# Patient Record
Sex: MALE | Race: WHITE | Employment: OTHER | URBAN - METROPOLITAN AREA
[De-identification: names, ages, dates, MRNs, and addresses within clinical notes are randomized per-mention and may not be internally consistent; named-entity substitution may affect disease eponyms.]

---

## 2017-01-04 DIAGNOSIS — E78.00 PURE HYPERCHOLESTEROLEMIA: ICD-10-CM

## 2017-01-05 RX ORDER — ATORVASTATIN CALCIUM 10 MG/1
TABLET, FILM COATED ORAL
Qty: 90 TABLET | Refills: 1 | Status: SHIPPED | OUTPATIENT
Start: 2017-01-05 | End: 2017-05-24 | Stop reason: SDUPTHER

## 2017-01-18 RX ORDER — SOLIFENACIN SUCCINATE 5 MG/1
5 TABLET, FILM COATED ORAL NIGHTLY
Qty: 90 TABLET | Refills: 1 | Status: SHIPPED | OUTPATIENT
Start: 2017-01-18 | End: 2017-06-16 | Stop reason: SDUPTHER

## 2017-02-28 ENCOUNTER — OFFICE VISIT (OUTPATIENT)
Dept: FAMILY MEDICINE CLINIC | Age: 82
End: 2017-02-28

## 2017-02-28 VITALS
HEART RATE: 52 BPM | RESPIRATION RATE: 14 BRPM | HEIGHT: 69 IN | BODY MASS INDEX: 24.05 KG/M2 | SYSTOLIC BLOOD PRESSURE: 116 MMHG | WEIGHT: 162.38 LBS | DIASTOLIC BLOOD PRESSURE: 66 MMHG

## 2017-02-28 DIAGNOSIS — I25.10 CORONARY ARTERY DISEASE INVOLVING NATIVE CORONARY ARTERY OF NATIVE HEART WITHOUT ANGINA PECTORIS: ICD-10-CM

## 2017-02-28 DIAGNOSIS — N40.0 BENIGN NON-NODULAR PROSTATIC HYPERPLASIA WITHOUT LOWER URINARY TRACT SYMPTOMS: ICD-10-CM

## 2017-02-28 DIAGNOSIS — E78.00 PURE HYPERCHOLESTEROLEMIA: ICD-10-CM

## 2017-02-28 DIAGNOSIS — M48.061 SPINAL STENOSIS OF LUMBAR REGION: ICD-10-CM

## 2017-02-28 DIAGNOSIS — G20 PARKINSON DISEASE (HCC): Primary | ICD-10-CM

## 2017-02-28 PROCEDURE — 99213 OFFICE O/P EST LOW 20 MIN: CPT | Performed by: FAMILY MEDICINE

## 2017-02-28 ASSESSMENT — ENCOUNTER SYMPTOMS
BACK PAIN: 0
CHEST TIGHTNESS: 0
EYE PAIN: 0
NAUSEA: 0
TROUBLE SWALLOWING: 0
CONSTIPATION: 0
SORE THROAT: 0
ORTHOPNEA: 0
COUGH: 0
ABDOMINAL PAIN: 0
BLOOD IN STOOL: 0
SHORTNESS OF BREATH: 0

## 2017-02-28 ASSESSMENT — PATIENT HEALTH QUESTIONNAIRE - PHQ9
SUM OF ALL RESPONSES TO PHQ9 QUESTIONS 1 & 2: 0
1. LITTLE INTEREST OR PLEASURE IN DOING THINGS: 0
SUM OF ALL RESPONSES TO PHQ QUESTIONS 1-9: 0
2. FEELING DOWN, DEPRESSED OR HOPELESS: 0

## 2017-04-20 ENCOUNTER — OFFICE VISIT (OUTPATIENT)
Dept: PHYSICAL MEDICINE AND REHAB | Age: 82
End: 2017-04-20

## 2017-04-20 VITALS
HEIGHT: 69 IN | WEIGHT: 166 LBS | HEART RATE: 47 BPM | BODY MASS INDEX: 24.59 KG/M2 | SYSTOLIC BLOOD PRESSURE: 145 MMHG | DIASTOLIC BLOOD PRESSURE: 64 MMHG

## 2017-04-20 DIAGNOSIS — M21.372 FOOT DROP, LEFT: ICD-10-CM

## 2017-04-20 DIAGNOSIS — G20 PARKINSON'S DISEASE (HCC): Primary | ICD-10-CM

## 2017-04-20 PROCEDURE — G8427 DOCREV CUR MEDS BY ELIG CLIN: HCPCS | Performed by: PSYCHIATRY & NEUROLOGY

## 2017-04-20 PROCEDURE — G8420 CALC BMI NORM PARAMETERS: HCPCS | Performed by: PSYCHIATRY & NEUROLOGY

## 2017-04-20 PROCEDURE — 1123F ACP DISCUSS/DSCN MKR DOCD: CPT | Performed by: PSYCHIATRY & NEUROLOGY

## 2017-04-20 PROCEDURE — 99213 OFFICE O/P EST LOW 20 MIN: CPT | Performed by: PSYCHIATRY & NEUROLOGY

## 2017-04-20 PROCEDURE — 4040F PNEUMOC VAC/ADMIN/RCVD: CPT | Performed by: PSYCHIATRY & NEUROLOGY

## 2017-04-20 PROCEDURE — G8599 NO ASA/ANTIPLAT THER USE RNG: HCPCS | Performed by: PSYCHIATRY & NEUROLOGY

## 2017-04-20 PROCEDURE — 1036F TOBACCO NON-USER: CPT | Performed by: PSYCHIATRY & NEUROLOGY

## 2017-05-24 ENCOUNTER — OFFICE VISIT (OUTPATIENT)
Dept: FAMILY MEDICINE CLINIC | Age: 82
End: 2017-05-24

## 2017-05-24 VITALS
BODY MASS INDEX: 24.07 KG/M2 | HEIGHT: 69 IN | HEART RATE: 56 BPM | WEIGHT: 162.5 LBS | DIASTOLIC BLOOD PRESSURE: 66 MMHG | SYSTOLIC BLOOD PRESSURE: 112 MMHG | RESPIRATION RATE: 12 BRPM

## 2017-05-24 DIAGNOSIS — M48.061 SPINAL STENOSIS OF LUMBAR REGION: ICD-10-CM

## 2017-05-24 DIAGNOSIS — G20 PARKINSON DISEASE (HCC): ICD-10-CM

## 2017-05-24 DIAGNOSIS — E78.00 PURE HYPERCHOLESTEROLEMIA: ICD-10-CM

## 2017-05-24 DIAGNOSIS — N40.0 BENIGN NON-NODULAR PROSTATIC HYPERPLASIA WITHOUT LOWER URINARY TRACT SYMPTOMS: ICD-10-CM

## 2017-05-24 DIAGNOSIS — I25.10 CORONARY ARTERY DISEASE INVOLVING NATIVE CORONARY ARTERY OF NATIVE HEART WITHOUT ANGINA PECTORIS: Primary | ICD-10-CM

## 2017-05-24 DIAGNOSIS — H61.21 IMPACTED CERUMEN OF RIGHT EAR: ICD-10-CM

## 2017-05-24 DIAGNOSIS — I10 ESSENTIAL HYPERTENSION: ICD-10-CM

## 2017-05-24 PROCEDURE — 99213 OFFICE O/P EST LOW 20 MIN: CPT | Performed by: FAMILY MEDICINE

## 2017-05-24 PROCEDURE — 69210 REMOVE IMPACTED EAR WAX UNI: CPT | Performed by: FAMILY MEDICINE

## 2017-05-24 RX ORDER — ATORVASTATIN CALCIUM 10 MG/1
TABLET, FILM COATED ORAL
Qty: 90 TABLET | Refills: 1 | Status: SHIPPED | OUTPATIENT
Start: 2017-05-24 | End: 2017-11-28 | Stop reason: SDUPTHER

## 2017-05-24 ASSESSMENT — ENCOUNTER SYMPTOMS
ABDOMINAL PAIN: 0
TROUBLE SWALLOWING: 0
CONSTIPATION: 0
CHEST TIGHTNESS: 0
NAUSEA: 0
EYE PAIN: 0
BACK PAIN: 0
BLOOD IN STOOL: 0
SHORTNESS OF BREATH: 0
SORE THROAT: 0
ORTHOPNEA: 0
COUGH: 0

## 2017-05-24 ASSESSMENT — PATIENT HEALTH QUESTIONNAIRE - PHQ9
SUM OF ALL RESPONSES TO PHQ QUESTIONS 1-9: 0
SUM OF ALL RESPONSES TO PHQ9 QUESTIONS 1 & 2: 0
2. FEELING DOWN, DEPRESSED OR HOPELESS: 0
1. LITTLE INTEREST OR PLEASURE IN DOING THINGS: 0

## 2017-06-12 ENCOUNTER — PROCEDURE VISIT (OUTPATIENT)
Dept: UROLOGY | Age: 82
End: 2017-06-12

## 2017-06-12 VITALS — BODY MASS INDEX: 22.48 KG/M2 | WEIGHT: 157 LBS | HEIGHT: 70 IN

## 2017-06-12 DIAGNOSIS — Z85.51 HISTORY OF BLADDER CANCER: Primary | ICD-10-CM

## 2017-06-12 PROCEDURE — 52000 CYSTOURETHROSCOPY: CPT | Performed by: UROLOGY

## 2017-06-12 PROCEDURE — 1036F TOBACCO NON-USER: CPT | Performed by: UROLOGY

## 2017-06-12 PROCEDURE — 99999 PR OFFICE/OUTPT VISIT,PROCEDURE ONLY: CPT | Performed by: UROLOGY

## 2017-06-12 RX ORDER — CIPROFLOXACIN 250 MG/1
250 TABLET, FILM COATED ORAL 2 TIMES DAILY
Qty: 14 TABLET | Refills: 0 | Status: SHIPPED | OUTPATIENT
Start: 2017-06-12 | End: 2017-06-19

## 2017-06-18 RX ORDER — SOLIFENACIN SUCCINATE 5 MG/1
TABLET, FILM COATED ORAL
Qty: 90 TABLET | Refills: 1 | Status: SHIPPED | OUTPATIENT
Start: 2017-06-18 | End: 2018-01-26 | Stop reason: SDUPTHER

## 2017-08-01 ENCOUNTER — HOSPITAL ENCOUNTER (OUTPATIENT)
Age: 82
Discharge: HOME | End: 2017-08-01
Payer: MEDICARE

## 2017-08-01 ENCOUNTER — HOSPITAL ENCOUNTER (OUTPATIENT)
Dept: AUDIOLOGY | Age: 82
Discharge: HOME | End: 2017-08-01

## 2017-08-01 DIAGNOSIS — I10 ESSENTIAL HYPERTENSION: ICD-10-CM

## 2017-08-01 DIAGNOSIS — E78.00 PURE HYPERCHOLESTEROLEMIA: ICD-10-CM

## 2017-08-01 LAB
ALBUMIN SERPL-MCNC: 3.8 G/DL (ref 3.5–5.1)
ALP BLD-CCNC: 88 U/L (ref 38–126)
ALT SERPL-CCNC: 24 U/L (ref 11–66)
ANION GAP SERPL CALCULATED.3IONS-SCNC: 12 MEQ/L (ref 8–16)
AST SERPL-CCNC: 21 U/L (ref 5–40)
BASOPHILS # BLD: 0.6 %
BASOPHILS ABSOLUTE: 0 THOU/MM3 (ref 0–0.1)
BILIRUB SERPL-MCNC: 0.7 MG/DL (ref 0.3–1.2)
BILIRUBIN DIRECT: < 0.2 MG/DL (ref 0–0.3)
BUN BLDV-MCNC: 18 MG/DL (ref 7–22)
CALCIUM SERPL-MCNC: 9.1 MG/DL (ref 8.5–10.5)
CHLORIDE BLD-SCNC: 105 MEQ/L (ref 98–111)
CHOLESTEROL, TOTAL: 129 MG/DL (ref 100–199)
CO2: 28 MEQ/L (ref 23–33)
CREAT SERPL-MCNC: 0.8 MG/DL (ref 0.4–1.2)
EOSINOPHIL # BLD: 2 %
EOSINOPHILS ABSOLUTE: 0.1 THOU/MM3 (ref 0–0.4)
GFR SERPL CREATININE-BSD FRML MDRD: > 90 ML/MIN/1.73M2
GLUCOSE BLD-MCNC: 106 MG/DL (ref 70–108)
HCT VFR BLD CALC: 42.7 % (ref 42–52)
HDLC SERPL-MCNC: 37 MG/DL
HEMOGLOBIN: 14.5 GM/DL (ref 14–18)
LDL CHOLESTEROL CALCULATED: 68 MG/DL
LYMPHOCYTES # BLD: 14.1 %
LYMPHOCYTES ABSOLUTE: 0.9 THOU/MM3 (ref 1–4.8)
MCH RBC QN AUTO: 31.8 PG (ref 27–31)
MCHC RBC AUTO-ENTMCNC: 34 GM/DL (ref 33–37)
MCV RBC AUTO: 93.5 FL (ref 80–94)
MONOCYTES # BLD: 8.2 %
MONOCYTES ABSOLUTE: 0.5 THOU/MM3 (ref 0.4–1.3)
NUCLEATED RED BLOOD CELLS: 0 /100 WBC
PDW BLD-RTO: 14.1 % (ref 11.5–14.5)
PLATELET # BLD: 132 THOU/MM3 (ref 130–400)
PMV BLD AUTO: 8.3 MCM (ref 7.4–10.4)
POTASSIUM SERPL-SCNC: 4.2 MEQ/L (ref 3.5–5.2)
RBC # BLD: 4.57 MILL/MM3 (ref 4.7–6.1)
RBC # BLD: NORMAL 10*6/UL
SEG NEUTROPHILS: 75.1 %
SEGMENTED NEUTROPHILS ABSOLUTE COUNT: 4.7 THOU/MM3 (ref 1.8–7.7)
SODIUM BLD-SCNC: 145 MEQ/L (ref 135–145)
TOTAL PROTEIN: 7.2 G/DL (ref 6.1–8)
TRIGL SERPL-MCNC: 118 MG/DL (ref 0–199)
TSH SERPL DL<=0.05 MIU/L-ACNC: 1.4 UIU/ML (ref 0.4–4.2)
WBC # BLD: 6.3 THOU/MM3 (ref 4.8–10.8)

## 2017-08-01 PROCEDURE — 80061 LIPID PANEL: CPT

## 2017-08-01 PROCEDURE — 36415 COLL VENOUS BLD VENIPUNCTURE: CPT

## 2017-08-01 PROCEDURE — 82248 BILIRUBIN DIRECT: CPT

## 2017-08-01 PROCEDURE — 9990000010 HC NO CHARGE VISIT: Performed by: AUDIOLOGIST

## 2017-08-01 PROCEDURE — 80050 GENERAL HEALTH PANEL: CPT

## 2017-08-03 ENCOUNTER — OFFICE VISIT (OUTPATIENT)
Dept: FAMILY MEDICINE CLINIC | Age: 82
End: 2017-08-03

## 2017-08-03 ENCOUNTER — TELEPHONE (OUTPATIENT)
Dept: FAMILY MEDICINE CLINIC | Age: 82
End: 2017-08-03

## 2017-08-03 VITALS
SYSTOLIC BLOOD PRESSURE: 116 MMHG | RESPIRATION RATE: 16 BRPM | DIASTOLIC BLOOD PRESSURE: 54 MMHG | WEIGHT: 162.25 LBS | BODY MASS INDEX: 23.23 KG/M2 | HEIGHT: 70 IN | HEART RATE: 68 BPM

## 2017-08-03 DIAGNOSIS — G20 PARKINSON DISEASE (HCC): ICD-10-CM

## 2017-08-03 DIAGNOSIS — H61.23 BILATERAL IMPACTED CERUMEN: Primary | ICD-10-CM

## 2017-08-03 PROCEDURE — 99213 OFFICE O/P EST LOW 20 MIN: CPT | Performed by: FAMILY MEDICINE

## 2017-08-03 PROCEDURE — 69210 REMOVE IMPACTED EAR WAX UNI: CPT | Performed by: FAMILY MEDICINE

## 2017-08-03 ASSESSMENT — ENCOUNTER SYMPTOMS
CONSTIPATION: 0
SHORTNESS OF BREATH: 0
SINUS PRESSURE: 0

## 2017-08-11 ENCOUNTER — TELEPHONE (OUTPATIENT)
Dept: FAMILY MEDICINE CLINIC | Age: 82
End: 2017-08-11

## 2017-08-25 ENCOUNTER — HOSPITAL ENCOUNTER (OUTPATIENT)
Age: 82
Discharge: HOME | End: 2017-08-25
Payer: MEDICARE

## 2017-08-25 ENCOUNTER — OFFICE VISIT (OUTPATIENT)
Dept: FAMILY MEDICINE CLINIC | Age: 82
End: 2017-08-25

## 2017-08-25 ENCOUNTER — HOSPITAL ENCOUNTER (OUTPATIENT)
Dept: GENERAL RADIOLOGY | Age: 82
Discharge: HOME | End: 2017-08-25
Payer: MEDICARE

## 2017-08-25 VITALS
HEART RATE: 70 BPM | BODY MASS INDEX: 23.25 KG/M2 | WEIGHT: 162.4 LBS | RESPIRATION RATE: 14 BRPM | DIASTOLIC BLOOD PRESSURE: 80 MMHG | HEIGHT: 70 IN | SYSTOLIC BLOOD PRESSURE: 140 MMHG

## 2017-08-25 DIAGNOSIS — R09.02 HYPOXIA: ICD-10-CM

## 2017-08-25 DIAGNOSIS — I25.10 CORONARY ARTERY DISEASE INVOLVING NATIVE CORONARY ARTERY OF NATIVE HEART WITHOUT ANGINA PECTORIS: ICD-10-CM

## 2017-08-25 DIAGNOSIS — M48.061 SPINAL STENOSIS OF LUMBAR REGION: Primary | ICD-10-CM

## 2017-08-25 DIAGNOSIS — N40.0 BENIGN NON-NODULAR PROSTATIC HYPERPLASIA WITHOUT LOWER URINARY TRACT SYMPTOMS: ICD-10-CM

## 2017-08-25 DIAGNOSIS — R53.83 FATIGUE, UNSPECIFIED TYPE: ICD-10-CM

## 2017-08-25 DIAGNOSIS — C43.72 MALIGNANT MELANOMA OF LEFT LOWER EXTREMITY INCLUDING HIP (HCC): ICD-10-CM

## 2017-08-25 DIAGNOSIS — G20 PARKINSON DISEASE (HCC): ICD-10-CM

## 2017-08-25 PROCEDURE — 99213 OFFICE O/P EST LOW 20 MIN: CPT | Performed by: FAMILY MEDICINE

## 2017-08-25 PROCEDURE — 71020 XR CHEST STANDARD TWO VW: CPT

## 2017-08-25 ASSESSMENT — ENCOUNTER SYMPTOMS
CHEST TIGHTNESS: 0
BLOOD IN STOOL: 0
SORE THROAT: 0
COUGH: 0
EYE PAIN: 0
CONSTIPATION: 0
BACK PAIN: 0
NAUSEA: 0
SHORTNESS OF BREATH: 0
TROUBLE SWALLOWING: 0
ABDOMINAL PAIN: 0

## 2017-09-18 RX ORDER — FINASTERIDE 5 MG/1
TABLET, FILM COATED ORAL
Qty: 90 TABLET | Refills: 0 | Status: SHIPPED | OUTPATIENT
Start: 2017-09-18 | End: 2018-01-01 | Stop reason: SDUPTHER

## 2017-10-09 RX ORDER — TAMSULOSIN HYDROCHLORIDE 0.4 MG/1
CAPSULE ORAL
Qty: 90 CAPSULE | Refills: 3 | Status: SHIPPED | OUTPATIENT
Start: 2017-10-09 | End: 2018-01-01 | Stop reason: SDUPTHER

## 2017-10-09 NOTE — TELEPHONE ENCOUNTER
Tamsulosin refill requested. Patient last seen on 6/12/17 by Dr. Tammy Reyes. Follow up appt is 6/18/18. Last refill on 10/29/16. Patient pharmacy is [facility]. Thank you.

## 2017-10-26 ENCOUNTER — HOSPITAL ENCOUNTER (OUTPATIENT)
Dept: AUDIOLOGY | Age: 82
Discharge: HOME | End: 2017-10-26

## 2017-10-26 PROCEDURE — 9990000010 HC NO CHARGE VISIT: Performed by: AUDIOLOGIST

## 2017-11-28 ENCOUNTER — OFFICE VISIT (OUTPATIENT)
Dept: FAMILY MEDICINE CLINIC | Age: 82
End: 2017-11-28

## 2017-11-28 VITALS
HEIGHT: 70 IN | HEART RATE: 76 BPM | BODY MASS INDEX: 23.19 KG/M2 | WEIGHT: 162 LBS | SYSTOLIC BLOOD PRESSURE: 132 MMHG | DIASTOLIC BLOOD PRESSURE: 80 MMHG | RESPIRATION RATE: 16 BRPM

## 2017-11-28 DIAGNOSIS — E78.00 PURE HYPERCHOLESTEROLEMIA: ICD-10-CM

## 2017-11-28 DIAGNOSIS — M48.061 SPINAL STENOSIS OF LUMBAR REGION, UNSPECIFIED WHETHER NEUROGENIC CLAUDICATION PRESENT: ICD-10-CM

## 2017-11-28 DIAGNOSIS — G20 PARKINSON DISEASE (HCC): ICD-10-CM

## 2017-11-28 DIAGNOSIS — N40.0 BENIGN PROSTATIC HYPERPLASIA WITHOUT LOWER URINARY TRACT SYMPTOMS: ICD-10-CM

## 2017-11-28 DIAGNOSIS — I25.10 CORONARY ARTERY DISEASE INVOLVING NATIVE CORONARY ARTERY OF NATIVE HEART WITHOUT ANGINA PECTORIS: Primary | ICD-10-CM

## 2017-11-28 DIAGNOSIS — G47.33 OBSTRUCTIVE SLEEP APNEA SYNDROME: ICD-10-CM

## 2017-11-28 PROCEDURE — 99213 OFFICE O/P EST LOW 20 MIN: CPT | Performed by: FAMILY MEDICINE

## 2017-11-28 RX ORDER — ATORVASTATIN CALCIUM 10 MG/1
TABLET, FILM COATED ORAL
Qty: 90 TABLET | Refills: 1 | Status: SHIPPED | OUTPATIENT
Start: 2017-11-28 | End: 2018-03-02 | Stop reason: SDUPTHER

## 2017-11-28 ASSESSMENT — ENCOUNTER SYMPTOMS
EYE PAIN: 0
SORE THROAT: 0
BLOOD IN STOOL: 0
SHORTNESS OF BREATH: 0
CONSTIPATION: 0
ABDOMINAL PAIN: 0
BACK PAIN: 0
TROUBLE SWALLOWING: 0
NAUSEA: 0
COUGH: 0
CHEST TIGHTNESS: 0

## 2017-11-28 NOTE — PROGRESS NOTES
neck stiffness. Skin: Negative for rash. Neurological: Negative for dizziness, focal weakness, weakness and headaches. Hematological: Negative for adenopathy. Does not bruise/bleed easily. Psychiatric/Behavioral: Negative for behavioral problems, dysphoric mood and sleep disturbance. /80 (Site: Right Arm, Position: Sitting, Cuff Size: Medium Adult)   Pulse 76   Resp 16   Ht 5' 10\" (1.778 m)   Wt 162 lb (73.5 kg)   BMI 23.24 kg/m²   Objective:   Physical Exam   Constitutional: He is oriented to person, place, and time. He appears well-developed and well-nourished. HENT:   Head: Normocephalic and atraumatic. Right Ear: External ear normal.   Left Ear: External ear normal.   Nose: Nose normal.   Mouth/Throat: Oropharynx is clear and moist.   Eyes: Conjunctivae and EOM are normal. Pupils are equal, round, and reactive to light. Fundi nl   Neck: Normal range of motion. Neck supple. No thyromegaly present. Cardiovascular: Normal rate, regular rhythm, normal heart sounds and intact distal pulses. Pulmonary/Chest: Effort normal and breath sounds normal. He has no wheezes. He has no rales. Abdominal: Soft. Bowel sounds are normal. He exhibits no mass. There is no tenderness. Musculoskeletal: Normal range of motion. Lymphadenopathy:     He has no cervical adenopathy. Neurological: He is alert and oriented to person, place, and time. He has normal reflexes. No cranial nerve deficit. Skin: Skin is warm and dry. No rash noted. Psychiatric: He has a normal mood and affect. Nursing note and vitals reviewed. Assessment:      1. Coronary artery disease involving native coronary artery of native heart without angina pectoris     2. Pure hypercholesterolemia  atorvastatin (LIPITOR) 10 MG tablet   3. Spinal stenosis of lumbar region, unspecified whether neurogenic claudication present     4. Parkinson disease ([address]     5.  Benign prostatic hyperplasia without lower urinary tract symptoms     6. Obstructive sleep apnea syndrome           Plan:      Current Outpatient Prescriptions   Medication Sig Dispense Refill    tamsulosin (FLOMAX) 0.4 MG capsule TAKE 1 CAPSULE BY MOUTH EVERY NIGHT 90 capsule 3    finasteride (PROSCAR) 5 MG tablet TAKE 1 TABLET BY MOUTH DAILY 90 tablet 0    VESICARE 5 MG tablet TAKE 1 TABLET NIGHTLY 90 tablet 1    atorvastatin (LIPITOR) 10 MG tablet TAKE 1 TABLET BY MOUTH DAILY 90 tablet 1    carbidopa-levodopa (SINEMET)  MG per tablet TAKE 1 TABLET BY MOUTH THREE TIMES DAILY 270 tablet 3    aspirin 81 MG EC tablet Take 1 tablet by mouth daily 30 tablet 3    amLODIPine (NORVASC) 2.5 MG tablet Take 1 tablet by mouth daily 30 tablet 0    Coenzyme Q10 (COQ10) 400 MG CAPS Take 1 capsule by mouth daily      Multiple Vitamins-Minerals (PRESERVISION/LUTEIN) CAPS Take 1 capsule by mouth 2 times daily      cetirizine (ZYRTEC) 10 MG tablet Take 10 mg by mouth daily.  fish oil-omega-3 fatty acids 1000 MG capsule Take 1 g by mouth daily       Multiple Vitamin (MULTIVITAMIN PO)   Take 1 tablet by mouth daily        No current facility-administered medications for this visit. No orders of the defined types were placed in this encounter. No results found for this visit on 11/28/17.

## 2017-12-04 ENCOUNTER — OFFICE VISIT (OUTPATIENT)
Dept: NEUROLOGY | Age: 82
End: 2017-12-04
Payer: MEDICARE

## 2017-12-04 VITALS
WEIGHT: 167 LBS | HEART RATE: 62 BPM | HEIGHT: 70 IN | DIASTOLIC BLOOD PRESSURE: 68 MMHG | BODY MASS INDEX: 23.91 KG/M2 | SYSTOLIC BLOOD PRESSURE: 140 MMHG

## 2017-12-04 DIAGNOSIS — G20 PARKINSON'S DISEASE (HCC): Primary | ICD-10-CM

## 2017-12-04 DIAGNOSIS — M21.372 LEFT FOOT DROP: ICD-10-CM

## 2017-12-04 PROCEDURE — 1123F ACP DISCUSS/DSCN MKR DOCD: CPT | Performed by: PSYCHIATRY & NEUROLOGY

## 2017-12-04 PROCEDURE — G8420 CALC BMI NORM PARAMETERS: HCPCS | Performed by: PSYCHIATRY & NEUROLOGY

## 2017-12-04 PROCEDURE — 99213 OFFICE O/P EST LOW 20 MIN: CPT | Performed by: PSYCHIATRY & NEUROLOGY

## 2017-12-04 PROCEDURE — 1036F TOBACCO NON-USER: CPT | Performed by: PSYCHIATRY & NEUROLOGY

## 2017-12-04 PROCEDURE — G8482 FLU IMMUNIZE ORDER/ADMIN: HCPCS | Performed by: PSYCHIATRY & NEUROLOGY

## 2017-12-04 PROCEDURE — 4040F PNEUMOC VAC/ADMIN/RCVD: CPT | Performed by: PSYCHIATRY & NEUROLOGY

## 2017-12-04 PROCEDURE — G8427 DOCREV CUR MEDS BY ELIG CLIN: HCPCS | Performed by: PSYCHIATRY & NEUROLOGY

## 2017-12-04 PROCEDURE — G8599 NO ASA/ANTIPLAT THER USE RNG: HCPCS | Performed by: PSYCHIATRY & NEUROLOGY

## 2017-12-04 NOTE — PROGRESS NOTES
NEUROLOGY OUT PATIENT FOLLOW UP NOTE:  12/4/20172:48 PM    Silver Adkins is here for follow up for Parkinson's, ataxic gait. The patient reports that he is doing better with the tremor on Sinemet. He denies any falls. No speech changes, no vision changes, no new numbness. He is compliant. He is tolerating the medication well. He is active. Medications tolerated well. He appears stable. He uses a cane. ROS:  Respiratory : no cough, no hemoptysis. Cardiac: no chest pain. No palpitations. Renal : no flank pain, no hematuria    Skin: no rash  Reviewed labs since last evaluation and discussed with patient. Allergies   Allergen Reactions    Sulfa Antibiotics      Unknown reaction from childhood       Current Outpatient Prescriptions:     atorvastatin (LIPITOR) 10 MG tablet, TAKE 1 TABLET BY MOUTH DAILY, Disp: 90 tablet, Rfl: 1    tamsulosin (FLOMAX) 0.4 MG capsule, TAKE 1 CAPSULE BY MOUTH EVERY NIGHT, Disp: 90 capsule, Rfl: 3    finasteride (PROSCAR) 5 MG tablet, TAKE 1 TABLET BY MOUTH DAILY, Disp: 90 tablet, Rfl: 0    VESICARE 5 MG tablet, TAKE 1 TABLET NIGHTLY, Disp: 90 tablet, Rfl: 1    carbidopa-levodopa (SINEMET)  MG per tablet, TAKE 1 TABLET BY MOUTH THREE TIMES DAILY, Disp: 270 tablet, Rfl: 3    aspirin 81 MG EC tablet, Take 1 tablet by mouth daily, Disp: 30 tablet, Rfl: 3    amLODIPine (NORVASC) 2.5 MG tablet, Take 1 tablet by mouth daily, Disp: 30 tablet, Rfl: 0    Coenzyme Q10 (COQ10) 400 MG CAPS, Take 1 capsule by mouth daily, Disp: , Rfl:     Multiple Vitamins-Minerals (PRESERVISION/LUTEIN) CAPS, Take 1 capsule by mouth 2 times daily, Disp: , Rfl:     cetirizine (ZYRTEC) 10 MG tablet, Take 10 mg by mouth daily.   , Disp: , Rfl:     fish oil-omega-3 fatty acids 1000 MG capsule, Take 1 g by mouth daily , Disp: , Rfl:     Multiple Vitamin (MULTIVITAMIN PO),  Take 1 tablet by mouth daily , Disp: , Rfl:       PE:   Vitals:    12/04/17 1434   BP: (!) 140/68   Site: Left Arm   Position: Sitting   Cuff Size: Medium Adult   Pulse: 62   Weight: 167 lb (75.8 kg)   Height: 5' 10\" (1.778 m)     General Appearance:  alert and cooperative  Gen: AO3, NAD, Language is Intact. Head: PERRL, EOMI, no icterus  Neck: There is no carotid bruits. The Neck is supple. Neuro: CN 2-12 grossly intact with no focal deficits. Power 5/5 Throughout symmetric, except mild left foot drop, Reflexes are decreased and symmetric. Long tracts are decreased distal. Cerebellar exam is Intact. Sensory exam is intact to light touch. Gait is guarded. There is no tremor. His speech pitch is improved. There is no muscle atrophy or fasciculation. Tends to cross his left/right leg when seated. Musculoskeletal:  Has no hand arthritis, no limitation of ROM in any of the four extremities,.   Lower extremities no edema        DATA:  Results for orders placed or performed during the hospital encounter of 08/01/17   Hepatic Function Panel   Result Value Ref Range    Alb 3.8 3.5 - 5.1 g/dL    Total Bilirubin 0.7 0.3 - 1.2 mg/dL    Bilirubin, Direct <0.2 0.0 - 0.3 mg/dL    Alkaline Phosphatase 88 38 - 126 U/L    AST 21 5 - 40 U/L    ALT 24 11 - 66 U/L    Total Protein 7.2 6.1 - 8.0 g/dL   Lipid Panel   Result Value Ref Range    Cholesterol, Total 129 100 - 199 mg/dL    Triglycerides 118 0 - 199 mg/dL    HDL 37 mg/dL    LDL Calculated 68 mg/dL   TSH with Reflex   Result Value Ref Range    TSH 1.400 0.400 - 4.20 uIU/mL   Comprehensive Metabolic Panel   Result Value Ref Range    Glucose 106 70 - 108 mg/dL    CREATININE 0.8 0.4 - 1.2 mg/dL    BUN 18 7 - 22 mg/dL    Sodium 145 135 - 145 meq/L    Potassium 4.2 3.5 - 5.2 meq/L    Chloride 105 98 - 111 meq/L    CO2 28 23 - 33 meq/L    Calcium 9.1 8.5 - 10.5 mg/dL   CBC Auto Differential   Result Value Ref Range    WBC 6.3 4.8 - 10.8 thou/mm3    RBC 4.57 (L) 4.70 - 6.10 mill/mm3    Hemoglobin 14.5 14.0 - 18.0 gm/dl    Hematocrit 42.7 42.0 - 52.0 %    MCV 93.5 80.0 - 94.0 fL    MCH 31.8 (H) 27.0 -

## 2018-01-01 ENCOUNTER — APPOINTMENT (OUTPATIENT)
Dept: CARDIAC REHAB | Age: 83
End: 2018-01-01
Payer: MEDICARE

## 2018-01-01 ENCOUNTER — APPOINTMENT (OUTPATIENT)
Dept: MRI IMAGING | Age: 83
DRG: 247 | End: 2018-01-01
Payer: MEDICARE

## 2018-01-01 ENCOUNTER — TELEPHONE (OUTPATIENT)
Dept: UROLOGY | Age: 83
End: 2018-01-01

## 2018-01-01 ENCOUNTER — APPOINTMENT (OUTPATIENT)
Dept: CT IMAGING | Age: 83
DRG: 247 | End: 2018-01-01
Payer: MEDICARE

## 2018-01-01 ENCOUNTER — APPOINTMENT (OUTPATIENT)
Dept: CARDIAC CATH/INVASIVE PROCEDURES | Age: 83
DRG: 247 | End: 2018-01-01
Payer: MEDICARE

## 2018-01-01 ENCOUNTER — HOSPITAL ENCOUNTER (OUTPATIENT)
Dept: CARDIAC REHAB | Age: 83
Setting detail: THERAPIES SERIES
Discharge: HOME | End: 2018-12-26
Payer: MEDICARE

## 2018-01-01 ENCOUNTER — HOSPITAL ENCOUNTER (OUTPATIENT)
Dept: CARDIAC REHAB | Age: 83
Setting detail: THERAPIES SERIES
Discharge: HOME | End: 2018-12-14
Payer: MEDICARE

## 2018-01-01 ENCOUNTER — HOSPITAL ENCOUNTER (OUTPATIENT)
Dept: CARDIAC REHAB | Age: 83
Setting detail: THERAPIES SERIES
Discharge: HOME | End: 2018-12-31
Payer: MEDICARE

## 2018-01-01 ENCOUNTER — APPOINTMENT (OUTPATIENT)
Dept: GENERAL RADIOLOGY | Age: 83
DRG: 247 | End: 2018-01-01
Payer: MEDICARE

## 2018-01-01 ENCOUNTER — TELEPHONE (OUTPATIENT)
Dept: FAMILY MEDICINE CLINIC | Age: 83
End: 2018-01-01

## 2018-01-01 ENCOUNTER — HOSPITAL ENCOUNTER (OUTPATIENT)
Age: 83
Discharge: HOME | End: 2018-10-18
Payer: MEDICARE

## 2018-01-01 ENCOUNTER — HOSPITAL ENCOUNTER (OUTPATIENT)
Dept: CARDIAC REHAB | Age: 83
Setting detail: THERAPIES SERIES
Discharge: HOME | End: 2018-12-07
Payer: MEDICARE

## 2018-01-01 ENCOUNTER — HOSPITAL ENCOUNTER (OUTPATIENT)
Age: 83
Discharge: HOME | End: 2018-08-07
Payer: MEDICARE

## 2018-01-01 ENCOUNTER — ANESTHESIA EVENT (OUTPATIENT)
Dept: OPERATING ROOM | Age: 83
End: 2018-01-01
Payer: MEDICARE

## 2018-01-01 ENCOUNTER — HOSPITAL ENCOUNTER (OUTPATIENT)
Dept: CARDIAC REHAB | Age: 83
Setting detail: THERAPIES SERIES
Discharge: HOME | End: 2018-12-19
Payer: MEDICARE

## 2018-01-01 ENCOUNTER — CARE COORDINATION (OUTPATIENT)
Dept: CASE MANAGEMENT | Age: 83
End: 2018-01-01

## 2018-01-01 ENCOUNTER — OFFICE VISIT (OUTPATIENT)
Dept: FAMILY MEDICINE CLINIC | Age: 83
End: 2018-01-01

## 2018-01-01 ENCOUNTER — HOSPITAL ENCOUNTER (OUTPATIENT)
Dept: CARDIAC REHAB | Age: 83
Setting detail: THERAPIES SERIES
Discharge: HOME | End: 2018-12-21
Payer: MEDICARE

## 2018-01-01 ENCOUNTER — OFFICE VISIT (OUTPATIENT)
Dept: UROLOGY | Age: 83
End: 2018-01-01
Payer: MEDICARE

## 2018-01-01 ENCOUNTER — HOSPITAL ENCOUNTER (OUTPATIENT)
Dept: CARDIAC REHAB | Age: 83
Setting detail: THERAPIES SERIES
Discharge: HOME | End: 2018-11-26
Payer: MEDICARE

## 2018-01-01 ENCOUNTER — OFFICE VISIT (OUTPATIENT)
Dept: NEUROLOGY | Age: 83
End: 2018-01-01
Payer: MEDICARE

## 2018-01-01 ENCOUNTER — HOSPITAL ENCOUNTER (OUTPATIENT)
Dept: CARDIAC REHAB | Age: 83
Setting detail: THERAPIES SERIES
Discharge: HOME | End: 2018-12-05
Payer: MEDICARE

## 2018-01-01 ENCOUNTER — HOSPITAL ENCOUNTER (OUTPATIENT)
Dept: CARDIAC REHAB | Age: 83
Setting detail: THERAPIES SERIES
Discharge: HOME | End: 2018-12-28
Payer: MEDICARE

## 2018-01-01 ENCOUNTER — HOSPITAL ENCOUNTER (OUTPATIENT)
Age: 83
Setting detail: OUTPATIENT SURGERY
Discharge: HOME | End: 2018-08-03
Attending: UROLOGY | Admitting: UROLOGY
Payer: MEDICARE

## 2018-01-01 ENCOUNTER — HOSPITAL ENCOUNTER (OUTPATIENT)
Dept: CARDIAC REHAB | Age: 83
Setting detail: THERAPIES SERIES
End: 2018-01-01
Payer: MEDICARE

## 2018-01-01 ENCOUNTER — HOSPITAL ENCOUNTER (OUTPATIENT)
Age: 83
Discharge: HOME | End: 2018-07-17
Payer: MEDICARE

## 2018-01-01 ENCOUNTER — HOSPITAL ENCOUNTER (OUTPATIENT)
Dept: CARDIAC REHAB | Age: 83
Setting detail: THERAPIES SERIES
Discharge: HOME | End: 2018-12-03
Payer: MEDICARE

## 2018-01-01 ENCOUNTER — HOSPITAL ENCOUNTER (OUTPATIENT)
Age: 83
Discharge: HOME | End: 2018-07-27
Payer: MEDICARE

## 2018-01-01 ENCOUNTER — HOSPITAL ENCOUNTER (OUTPATIENT)
Dept: CARDIAC REHAB | Age: 83
Setting detail: THERAPIES SERIES
Discharge: HOME | End: 2018-12-12
Payer: MEDICARE

## 2018-01-01 ENCOUNTER — PROCEDURE VISIT (OUTPATIENT)
Dept: UROLOGY | Age: 83
End: 2018-01-01
Payer: MEDICARE

## 2018-01-01 ENCOUNTER — HOSPITAL ENCOUNTER (OUTPATIENT)
Dept: CARDIAC REHAB | Age: 83
Setting detail: THERAPIES SERIES
Discharge: HOME | End: 2018-11-28
Payer: MEDICARE

## 2018-01-01 ENCOUNTER — HOSPITAL ENCOUNTER (OUTPATIENT)
Dept: AUDIOLOGY | Age: 83
Discharge: HOME | End: 2018-04-18

## 2018-01-01 ENCOUNTER — NURSE ONLY (OUTPATIENT)
Dept: FAMILY MEDICINE CLINIC | Age: 83
End: 2018-01-01

## 2018-01-01 ENCOUNTER — HOSPITAL ENCOUNTER (INPATIENT)
Age: 83
LOS: 4 days | Discharge: HOME | DRG: 247 | End: 2018-11-06
Attending: FAMILY MEDICINE | Admitting: FAMILY MEDICINE
Payer: MEDICARE

## 2018-01-01 ENCOUNTER — ANESTHESIA (OUTPATIENT)
Dept: OPERATING ROOM | Age: 83
End: 2018-01-01
Payer: MEDICARE

## 2018-01-01 ENCOUNTER — TELEPHONE (OUTPATIENT)
Dept: NEUROSURGERY | Age: 83
End: 2018-01-01

## 2018-01-01 ENCOUNTER — HOSPITAL ENCOUNTER (OUTPATIENT)
Dept: CARDIAC REHAB | Age: 83
Setting detail: THERAPIES SERIES
Discharge: HOME | End: 2018-12-10
Payer: MEDICARE

## 2018-01-01 ENCOUNTER — APPOINTMENT (OUTPATIENT)
Dept: INTERVENTIONAL RADIOLOGY/VASCULAR | Age: 83
DRG: 247 | End: 2018-01-01
Payer: MEDICARE

## 2018-01-01 ENCOUNTER — HOSPITAL ENCOUNTER (OUTPATIENT)
Dept: CARDIAC REHAB | Age: 83
Setting detail: THERAPIES SERIES
Discharge: HOME | End: 2018-11-20
Payer: MEDICARE

## 2018-01-01 ENCOUNTER — TELEPHONE (OUTPATIENT)
Dept: NEUROLOGY | Age: 83
End: 2018-01-01

## 2018-01-01 VITALS
WEIGHT: 155.6 LBS | RESPIRATION RATE: 16 BRPM | DIASTOLIC BLOOD PRESSURE: 66 MMHG | HEART RATE: 56 BPM | SYSTOLIC BLOOD PRESSURE: 124 MMHG | HEIGHT: 70 IN | BODY MASS INDEX: 22.28 KG/M2

## 2018-01-01 VITALS
TEMPERATURE: 98.2 F | HEART RATE: 64 BPM | WEIGHT: 151.4 LBS | RESPIRATION RATE: 18 BRPM | HEIGHT: 70 IN | DIASTOLIC BLOOD PRESSURE: 56 MMHG | SYSTOLIC BLOOD PRESSURE: 116 MMHG | BODY MASS INDEX: 21.67 KG/M2 | OXYGEN SATURATION: 93 %

## 2018-01-01 VITALS
DIASTOLIC BLOOD PRESSURE: 73 MMHG | BODY MASS INDEX: 21.72 KG/M2 | SYSTOLIC BLOOD PRESSURE: 165 MMHG | HEART RATE: 69 BPM | HEIGHT: 70 IN

## 2018-01-01 VITALS
HEART RATE: 74 BPM | BODY MASS INDEX: 22.19 KG/M2 | WEIGHT: 155 LBS | DIASTOLIC BLOOD PRESSURE: 68 MMHG | SYSTOLIC BLOOD PRESSURE: 130 MMHG | HEIGHT: 70 IN

## 2018-01-01 VITALS
BODY MASS INDEX: 23.79 KG/M2 | DIASTOLIC BLOOD PRESSURE: 58 MMHG | WEIGHT: 157 LBS | SYSTOLIC BLOOD PRESSURE: 126 MMHG | HEIGHT: 68 IN

## 2018-01-01 VITALS
HEART RATE: 58 BPM | HEIGHT: 70 IN | SYSTOLIC BLOOD PRESSURE: 126 MMHG | DIASTOLIC BLOOD PRESSURE: 72 MMHG | WEIGHT: 158.6 LBS | BODY MASS INDEX: 22.71 KG/M2

## 2018-01-01 VITALS
WEIGHT: 152 LBS | HEIGHT: 70 IN | SYSTOLIC BLOOD PRESSURE: 140 MMHG | BODY MASS INDEX: 21.76 KG/M2 | DIASTOLIC BLOOD PRESSURE: 56 MMHG

## 2018-01-01 VITALS
WEIGHT: 158.5 LBS | RESPIRATION RATE: 12 BRPM | HEART RATE: 60 BPM | HEIGHT: 68 IN | SYSTOLIC BLOOD PRESSURE: 110 MMHG | BODY MASS INDEX: 24.02 KG/M2 | DIASTOLIC BLOOD PRESSURE: 66 MMHG

## 2018-01-01 VITALS
HEIGHT: 68 IN | SYSTOLIC BLOOD PRESSURE: 106 MMHG | DIASTOLIC BLOOD PRESSURE: 66 MMHG | BODY MASS INDEX: 23.72 KG/M2 | WEIGHT: 156.5 LBS | RESPIRATION RATE: 14 BRPM | HEART RATE: 60 BPM

## 2018-01-01 VITALS
RESPIRATION RATE: 10 BRPM | SYSTOLIC BLOOD PRESSURE: 143 MMHG | OXYGEN SATURATION: 100 % | DIASTOLIC BLOOD PRESSURE: 67 MMHG

## 2018-01-01 VITALS
WEIGHT: 160 LBS | DIASTOLIC BLOOD PRESSURE: 66 MMHG | BODY MASS INDEX: 22.9 KG/M2 | RESPIRATION RATE: 14 BRPM | HEIGHT: 70 IN | SYSTOLIC BLOOD PRESSURE: 120 MMHG | HEART RATE: 52 BPM

## 2018-01-01 VITALS
SYSTOLIC BLOOD PRESSURE: 141 MMHG | RESPIRATION RATE: 16 BRPM | WEIGHT: 154 LBS | OXYGEN SATURATION: 92 % | HEART RATE: 64 BPM | TEMPERATURE: 97.8 F | BODY MASS INDEX: 23.42 KG/M2 | DIASTOLIC BLOOD PRESSURE: 65 MMHG

## 2018-01-01 VITALS
RESPIRATION RATE: 16 BRPM | SYSTOLIC BLOOD PRESSURE: 120 MMHG | HEART RATE: 60 BPM | BODY MASS INDEX: 22.05 KG/M2 | DIASTOLIC BLOOD PRESSURE: 66 MMHG | WEIGHT: 154 LBS | HEIGHT: 70 IN

## 2018-01-01 VITALS
DIASTOLIC BLOOD PRESSURE: 60 MMHG | WEIGHT: 155 LBS | BODY MASS INDEX: 22.19 KG/M2 | SYSTOLIC BLOOD PRESSURE: 120 MMHG | HEIGHT: 70 IN

## 2018-01-01 DIAGNOSIS — D49.4 BLADDER TUMOR: Primary | ICD-10-CM

## 2018-01-01 DIAGNOSIS — D49.4 BLADDER TUMOR: ICD-10-CM

## 2018-01-01 DIAGNOSIS — G20 PARKINSON DISEASE (HCC): ICD-10-CM

## 2018-01-01 DIAGNOSIS — E78.00 PURE HYPERCHOLESTEROLEMIA: ICD-10-CM

## 2018-01-01 DIAGNOSIS — R55 SYNCOPE AND COLLAPSE: ICD-10-CM

## 2018-01-01 DIAGNOSIS — I25.10 CORONARY ARTERY DISEASE INVOLVING NATIVE CORONARY ARTERY OF NATIVE HEART WITHOUT ANGINA PECTORIS: Primary | ICD-10-CM

## 2018-01-01 DIAGNOSIS — R41.82 ALTERED MENTAL STATUS, UNSPECIFIED ALTERED MENTAL STATUS TYPE: Primary | ICD-10-CM

## 2018-01-01 DIAGNOSIS — I25.10 CORONARY ARTERY DISEASE INVOLVING NATIVE CORONARY ARTERY OF NATIVE HEART WITHOUT ANGINA PECTORIS: ICD-10-CM

## 2018-01-01 DIAGNOSIS — R55 SYNCOPE AND COLLAPSE: Primary | ICD-10-CM

## 2018-01-01 DIAGNOSIS — G20 PARKINSON DISEASE (HCC): Primary | ICD-10-CM

## 2018-01-01 DIAGNOSIS — G20 PARKINSON'S DISEASE (HCC): Primary | ICD-10-CM

## 2018-01-01 DIAGNOSIS — Z83.2 FAMILY HISTORY OF FACTOR V LEIDEN MUTATION: ICD-10-CM

## 2018-01-01 DIAGNOSIS — G47.33 OBSTRUCTIVE SLEEP APNEA SYNDROME: ICD-10-CM

## 2018-01-01 DIAGNOSIS — L30.9 DERMATITIS: Primary | ICD-10-CM

## 2018-01-01 DIAGNOSIS — H61.21 HEARING LOSS OF RIGHT EAR DUE TO CERUMEN IMPACTION: ICD-10-CM

## 2018-01-01 DIAGNOSIS — S06.5XAA SUBDURAL HEMATOMA: ICD-10-CM

## 2018-01-01 DIAGNOSIS — N40.0 BENIGN PROSTATIC HYPERPLASIA WITHOUT LOWER URINARY TRACT SYMPTOMS: ICD-10-CM

## 2018-01-01 DIAGNOSIS — M21.372 LEFT FOOT DROP: ICD-10-CM

## 2018-01-01 DIAGNOSIS — C67.8 MALIGNANT NEOPLASM OF OVERLAPPING SITES OF BLADDER (HCC): ICD-10-CM

## 2018-01-01 DIAGNOSIS — R55 VASOVAGAL SYNCOPE: ICD-10-CM

## 2018-01-01 DIAGNOSIS — C67.8 MALIGNANT NEOPLASM OF OVERLAPPING SITES OF BLADDER (HCC): Primary | ICD-10-CM

## 2018-01-01 DIAGNOSIS — R35.0 URINARY FREQUENCY: ICD-10-CM

## 2018-01-01 DIAGNOSIS — K21.9 GASTROESOPHAGEAL REFLUX DISEASE WITHOUT ESOPHAGITIS: ICD-10-CM

## 2018-01-01 DIAGNOSIS — Z01.818 PRE-OP TESTING: ICD-10-CM

## 2018-01-01 DIAGNOSIS — Z23 NEED FOR INFLUENZA VACCINATION: Primary | ICD-10-CM

## 2018-01-01 DIAGNOSIS — N30.00 ACUTE CYSTITIS WITHOUT HEMATURIA: ICD-10-CM

## 2018-01-01 DIAGNOSIS — R35.0 URINARY FREQUENCY: Primary | ICD-10-CM

## 2018-01-01 DIAGNOSIS — G20 PARKINSON'S DISEASE (HCC): ICD-10-CM

## 2018-01-01 DIAGNOSIS — Z83.2 FAMILY HISTORY OF FACTOR V LEIDEN MUTATION: Primary | ICD-10-CM

## 2018-01-01 LAB
ABO: NORMAL
ALBUMIN SERPL-MCNC: 3.5 G/DL (ref 3.5–5.1)
ALBUMIN SERPL-MCNC: 3.9 G/DL (ref 3.5–5.1)
ALP BLD-CCNC: 103 U/L (ref 38–126)
ALP BLD-CCNC: 91 U/L (ref 38–126)
ALT SERPL-CCNC: 6 U/L (ref 11–66)
ALT SERPL-CCNC: < 5 U/L (ref 11–66)
ANION GAP SERPL CALCULATED.3IONS-SCNC: 11 MEQ/L (ref 8–16)
ANION GAP SERPL CALCULATED.3IONS-SCNC: 12 MEQ/L (ref 8–16)
ANION GAP SERPL CALCULATED.3IONS-SCNC: 14 MEQ/L (ref 8–16)
ANION GAP SERPL CALCULATED.3IONS-SCNC: 9 MEQ/L (ref 8–16)
ANTIBODY SCREEN: NORMAL
APTT: 30.6 SECONDS (ref 22–38)
AST SERPL-CCNC: 20 U/L (ref 5–40)
AST SERPL-CCNC: 22 U/L (ref 5–40)
BACTERIA URINE, POC: ABNORMAL
BACTERIA: ABNORMAL /HPF
BASOPHILS # BLD: 0.4 %
BASOPHILS # BLD: 0.5 %
BASOPHILS ABSOLUTE: 0 THOU/MM3 (ref 0–0.1)
BASOPHILS ABSOLUTE: 0 THOU/MM3 (ref 0–0.1)
BILIRUB SERPL-MCNC: 0.5 MG/DL (ref 0.3–1.2)
BILIRUB SERPL-MCNC: 0.5 MG/DL (ref 0.3–1.2)
BILIRUBIN URINE: 0 MG/DL
BILIRUBIN URINE: NEGATIVE
BLOOD URINE, POC: ABNORMAL
BLOOD URINE, POC: ABNORMAL
BLOOD, URINE: NEGATIVE
BUN BLDV-MCNC: 13 MG/DL (ref 7–22)
BUN BLDV-MCNC: 16 MG/DL (ref 7–22)
BUN BLDV-MCNC: 19 MG/DL (ref 7–22)
BUN BLDV-MCNC: 24 MG/DL (ref 7–22)
CALCIUM SERPL-MCNC: 8.6 MG/DL (ref 8.5–10.5)
CALCIUM SERPL-MCNC: 8.9 MG/DL (ref 8.5–10.5)
CALCIUM SERPL-MCNC: 9.1 MG/DL (ref 8.5–10.5)
CALCIUM SERPL-MCNC: 9.2 MG/DL (ref 8.5–10.5)
CASTS 2: ABNORMAL /LPF
CASTS UA: ABNORMAL /LPF
CASTS URINE, POC: ABNORMAL
CHARACTER, URINE: ABNORMAL
CHARACTER, URINE: CLEAR
CHLORIDE BLD-SCNC: 102 MEQ/L (ref 98–111)
CHLORIDE BLD-SCNC: 105 MEQ/L (ref 98–111)
CHLORIDE BLD-SCNC: 105 MEQ/L (ref 98–111)
CHLORIDE BLD-SCNC: 106 MEQ/L (ref 98–111)
CHOLESTEROL, TOTAL: 150 MG/DL (ref 100–199)
CHOLESTEROL, TOTAL: 168 MG/DL (ref 100–199)
CLARITY: ABNORMAL
CO2: 22 MEQ/L (ref 23–33)
CO2: 26 MEQ/L (ref 23–33)
CO2: 27 MEQ/L (ref 23–33)
CO2: 27 MEQ/L (ref 23–33)
COLOR, URINE: YELLOW
COLOR, URINE: YELLOW
COLOR: YELLOW
CREAT SERPL-MCNC: 0.6 MG/DL (ref 0.4–1.2)
CREAT SERPL-MCNC: 0.7 MG/DL (ref 0.4–1.2)
CREAT SERPL-MCNC: 0.8 MG/DL (ref 0.4–1.2)
CREAT SERPL-MCNC: 0.8 MG/DL (ref 0.4–1.2)
CRYSTALS URINE, POC: ABNORMAL
CRYSTALS, UA: ABNORMAL
EKG ATRIAL RATE: 56 BPM
EKG ATRIAL RATE: 70 BPM
EKG P AXIS: 55 DEGREES
EKG P AXIS: 74 DEGREES
EKG P-R INTERVAL: 182 MS
EKG P-R INTERVAL: 188 MS
EKG Q-T INTERVAL: 452 MS
EKG Q-T INTERVAL: 478 MS
EKG QRS DURATION: 128 MS
EKG QRS DURATION: 138 MS
EKG QTC CALCULATION (BAZETT): 461 MS
EKG QTC CALCULATION (BAZETT): 488 MS
EKG R AXIS: -60 DEGREES
EKG R AXIS: -63 DEGREES
EKG T AXIS: 10 DEGREES
EKG T AXIS: 36 DEGREES
EKG VENTRICULAR RATE: 56 BPM
EKG VENTRICULAR RATE: 70 BPM
EOSINOPHIL # BLD: 1.3 %
EOSINOPHIL # BLD: 2 %
EOSINOPHILS ABSOLUTE: 0.1 THOU/MM3 (ref 0–0.4)
EOSINOPHILS ABSOLUTE: 0.1 THOU/MM3 (ref 0–0.4)
EPI CELLS URINE, POC: ABNORMAL
EPITHELIAL CELLS, UA: ABNORMAL /HPF
ERYTHROCYTE [DISTWIDTH] IN BLOOD BY AUTOMATED COUNT: 13.2 % (ref 11.5–14.5)
ERYTHROCYTE [DISTWIDTH] IN BLOOD BY AUTOMATED COUNT: 13.3 % (ref 11.5–14.5)
ERYTHROCYTE [DISTWIDTH] IN BLOOD BY AUTOMATED COUNT: 13.4 % (ref 11.5–14.5)
ERYTHROCYTE [DISTWIDTH] IN BLOOD BY AUTOMATED COUNT: 45.1 FL (ref 35–45)
ERYTHROCYTE [DISTWIDTH] IN BLOOD BY AUTOMATED COUNT: 45.4 FL (ref 35–45)
ERYTHROCYTE [DISTWIDTH] IN BLOOD BY AUTOMATED COUNT: 46.5 FL (ref 35–45)
ERYTHROCYTE [DISTWIDTH] IN BLOOD BY AUTOMATED COUNT: 46.5 FL (ref 35–45)
ERYTHROCYTE [DISTWIDTH] IN BLOOD BY AUTOMATED COUNT: 47 FL (ref 35–45)
FACTOR V LEIDEN MUTATION: NORMAL
GFR SERPL CREATININE-BSD FRML MDRD: > 90 ML/MIN/1.73M2
GLUCOSE BLD-MCNC: 101 MG/DL (ref 70–108)
GLUCOSE BLD-MCNC: 102 MG/DL (ref 70–108)
GLUCOSE BLD-MCNC: 107 MG/DL (ref 70–108)
GLUCOSE BLD-MCNC: 113 MG/DL (ref 70–108)
GLUCOSE BLD-MCNC: 98 MG/DL (ref 70–108)
GLUCOSE URINE: NEGATIVE
GLUCOSE URINE: NEGATIVE MG/DL
HCT VFR BLD CALC: 38.8 % (ref 42–52)
HCT VFR BLD CALC: 39 % (ref 42–52)
HCT VFR BLD CALC: 41 % (ref 42–52)
HCT VFR BLD CALC: 43.3 % (ref 42–52)
HCT VFR BLD CALC: 44.2 % (ref 42–52)
HDLC SERPL-MCNC: 30 MG/DL
HDLC SERPL-MCNC: 34 MG/DL
HEMOGLOBIN: 13.1 GM/DL (ref 14–18)
HEMOGLOBIN: 13.5 GM/DL (ref 14–18)
HEMOGLOBIN: 13.9 GM/DL (ref 14–18)
HEMOGLOBIN: 14.3 GM/DL (ref 14–18)
HEMOGLOBIN: 14.7 GM/DL (ref 14–18)
IMMATURE GRANS (ABS): 0.01 THOU/MM3 (ref 0–0.07)
IMMATURE GRANS (ABS): 0.02 THOU/MM3 (ref 0–0.07)
IMMATURE GRANULOCYTES: 0.2 %
IMMATURE GRANULOCYTES: 0.3 %
INR BLD: 1.07 (ref 0.85–1.13)
INR BLD: 1.1 (ref 0.85–1.13)
KETONES, URINE: ABNORMAL
KETONES, URINE: NEGATIVE
LDL CHOLESTEROL CALCULATED: 91 MG/DL
LDL CHOLESTEROL CALCULATED: 97 MG/DL
LEUKOCYTE CLUMPS, URINE: ABNORMAL
LEUKOCYTE CLUMPS, URINE: ABNORMAL
LEUKOCYTE EST, POC: NEGATIVE
LEUKOCYTE ESTERASE, URINE: ABNORMAL
LEUKOCYTE ESTERASE, URINE: NEGATIVE
LV EF: 55 %
LVEF MODALITY: NORMAL
LYMPHOCYTES # BLD: 12.9 %
LYMPHOCYTES # BLD: 17.6 %
LYMPHOCYTES ABSOLUTE: 1 THOU/MM3 (ref 1–4.8)
LYMPHOCYTES ABSOLUTE: 1.1 THOU/MM3 (ref 1–4.8)
MCH RBC QN AUTO: 31.4 PG (ref 26–33)
MCH RBC QN AUTO: 31.7 PG (ref 26–33)
MCH RBC QN AUTO: 31.8 PG (ref 26–33)
MCH RBC QN AUTO: 32 PG (ref 26–33)
MCH RBC QN AUTO: 32.4 PG (ref 26–33)
MCHC RBC AUTO-ENTMCNC: 33 GM/DL (ref 32.2–35.5)
MCHC RBC AUTO-ENTMCNC: 33.3 GM/DL (ref 32.2–35.5)
MCHC RBC AUTO-ENTMCNC: 33.8 GM/DL (ref 32.2–35.5)
MCHC RBC AUTO-ENTMCNC: 33.9 GM/DL (ref 32.2–35.5)
MCHC RBC AUTO-ENTMCNC: 34.6 GM/DL (ref 32.2–35.5)
MCV RBC AUTO: 93.4 FL (ref 80–94)
MCV RBC AUTO: 93.5 FL (ref 80–94)
MCV RBC AUTO: 94.6 FL (ref 80–94)
MCV RBC AUTO: 95 FL (ref 80–94)
MCV RBC AUTO: 95.7 FL (ref 80–94)
MISCELLANEOUS 2: ABNORMAL
MONOCYTES # BLD: 7 %
MONOCYTES # BLD: 9.9 %
MONOCYTES ABSOLUTE: 0.5 THOU/MM3 (ref 0.4–1.3)
MONOCYTES ABSOLUTE: 0.6 THOU/MM3 (ref 0.4–1.3)
NITRITE, URINE: NEGATIVE
NUCLEATED RED BLOOD CELLS: 0 /100 WBC
NUCLEATED RED BLOOD CELLS: 0 /100 WBC
ORGANISM: ABNORMAL
ORGANISM: ABNORMAL
OSMOLALITY CALCULATION: 288 MOSMOL/KG (ref 275–300)
PH UA: 6
PH UA: 6.5
PH UA: 6.5 (ref 4.5–8)
PH, URINE: 5.5
PH, URINE: 7
PLATELET # BLD: 135 THOU/MM3 (ref 130–400)
PLATELET # BLD: 136 THOU/MM3 (ref 130–400)
PLATELET # BLD: 143 THOU/MM3 (ref 130–400)
PLATELET # BLD: 143 THOU/MM3 (ref 130–400)
PLATELET # BLD: 152 THOU/MM3 (ref 130–400)
PMV BLD AUTO: 10 FL (ref 9.4–12.4)
PMV BLD AUTO: 10 FL (ref 9.4–12.4)
PMV BLD AUTO: 10.2 FL (ref 9.4–12.4)
PMV BLD AUTO: 10.7 FL (ref 9.4–12.4)
PMV BLD AUTO: 9.9 FL (ref 9.4–12.4)
POTASSIUM REFLEX MAGNESIUM: 4 MEQ/L (ref 3.5–5.2)
POTASSIUM SERPL-SCNC: 3.8 MEQ/L (ref 3.5–5.2)
POTASSIUM SERPL-SCNC: 4 MEQ/L (ref 3.5–5.2)
POTASSIUM SERPL-SCNC: 4.1 MEQ/L (ref 3.5–5.2)
PRO-BNP: 210.6 PG/ML (ref 0–1800)
PROTEIN UA: NEGATIVE
PROTEIN, URINE: NEGATIVE MG/DL
PROTEIN, URINE: NEGATIVE MG/DL
RBC # BLD: 4.1 MILL/MM3 (ref 4.7–6.1)
RBC # BLD: 4.17 MILL/MM3 (ref 4.7–6.1)
RBC # BLD: 4.39 MILL/MM3 (ref 4.7–6.1)
RBC # BLD: 4.56 MILL/MM3 (ref 4.7–6.1)
RBC # BLD: 4.62 MILL/MM3 (ref 4.7–6.1)
RBC URINE, POC: ABNORMAL
RBC URINE: ABNORMAL /HPF
RENAL EPITHELIAL, UA: ABNORMAL
RH FACTOR: NORMAL
SEG NEUTROPHILS: 69.8 %
SEG NEUTROPHILS: 78.1 %
SEGMENTED NEUTROPHILS ABSOLUTE COUNT: 4.3 THOU/MM3 (ref 1.8–7.7)
SEGMENTED NEUTROPHILS ABSOLUTE COUNT: 6 THOU/MM3 (ref 1.8–7.7)
SODIUM BLD-SCNC: 138 MEQ/L (ref 135–145)
SODIUM BLD-SCNC: 140 MEQ/L (ref 135–145)
SODIUM BLD-SCNC: 143 MEQ/L (ref 135–145)
SODIUM BLD-SCNC: 145 MEQ/L (ref 135–145)
SPECIFIC GRAVITY UA: 1.02 (ref 1–1.03)
SPECIFIC GRAVITY, URINE: 1.01 (ref 1–1.03)
SPECIFIC GRAVITY, URINE: 1.02 (ref 1–1.03)
SPECIFIC GRAVITY, URINE: 1.02 (ref 1–1.03)
SPECIFIC GRAVITY, URINE: <= 1.005 (ref 1–1.03)
TOTAL PROTEIN: 6.5 G/DL (ref 6.1–8)
TOTAL PROTEIN: 7.3 G/DL (ref 6.1–8)
TRIGL SERPL-MCNC: 124 MG/DL (ref 0–199)
TRIGL SERPL-MCNC: 205 MG/DL (ref 0–199)
TROPONIN T: < 0.01 NG/ML
TSH SERPL DL<=0.05 MIU/L-ACNC: 1.62 UIU/ML (ref 0.4–4.2)
URINE CULTURE REFLEX: ABNORMAL
URINE CULTURE, ROUTINE: ABNORMAL
UROBILINOGEN, URINE: 0.2 EU/DL
UROBILINOGEN, URINE: 0.2 EU/DL
UROBILINOGEN, URINE: 1 EU/DL
UROBILINOGEN, URINE: 1 EU/DL
UROBILINOGEN, URINE: NORMAL
WBC # BLD: 6.1 THOU/MM3 (ref 4.8–10.8)
WBC # BLD: 6.1 THOU/MM3 (ref 4.8–10.8)
WBC # BLD: 7.4 THOU/MM3 (ref 4.8–10.8)
WBC # BLD: 7.4 THOU/MM3 (ref 4.8–10.8)
WBC # BLD: 7.7 THOU/MM3 (ref 4.8–10.8)
WBC UA: ABNORMAL /HPF
WBC URINE, POC: ABNORMAL
YEAST URINE, POC: ABNORMAL
YEAST: ABNORMAL

## 2018-01-01 PROCEDURE — 93880 EXTRACRANIAL BILAT STUDY: CPT

## 2018-01-01 PROCEDURE — 82948 REAGENT STRIP/BLOOD GLUCOSE: CPT

## 2018-01-01 PROCEDURE — 99999 PR OFFICE/OUTPT VISIT,PROCEDURE ONLY: CPT | Performed by: UROLOGY

## 2018-01-01 PROCEDURE — 1123F ACP DISCUSS/DSCN MKR DOCD: CPT | Performed by: PSYCHIATRY & NEUROLOGY

## 2018-01-01 PROCEDURE — 87147 CULTURE TYPE IMMUNOLOGIC: CPT

## 2018-01-01 PROCEDURE — 2500000003 HC RX 250 WO HCPCS: Performed by: FAMILY MEDICINE

## 2018-01-01 PROCEDURE — 1101F PT FALLS ASSESS-DOCD LE1/YR: CPT | Performed by: PSYCHIATRY & NEUROLOGY

## 2018-01-01 PROCEDURE — 1036F TOBACCO NON-USER: CPT | Performed by: PSYCHIATRY & NEUROLOGY

## 2018-01-01 PROCEDURE — 36415 COLL VENOUS BLD VENIPUNCTURE: CPT

## 2018-01-01 PROCEDURE — 6370000000 HC RX 637 (ALT 250 FOR IP): Performed by: FAMILY MEDICINE

## 2018-01-01 PROCEDURE — G0422 INTENS CARDIAC REHAB W/EXERC: HCPCS

## 2018-01-01 PROCEDURE — 93005 ELECTROCARDIOGRAM TRACING: CPT | Performed by: FAMILY MEDICINE

## 2018-01-01 PROCEDURE — G0423 INTENS CARDIAC REHAB NO EXER: HCPCS

## 2018-01-01 PROCEDURE — 85025 COMPLETE CBC W/AUTO DIFF WBC: CPT

## 2018-01-01 PROCEDURE — 4040F PNEUMOC VAC/ADMIN/RCVD: CPT | Performed by: PSYCHIATRY & NEUROLOGY

## 2018-01-01 PROCEDURE — 81003 URINALYSIS AUTO W/O SCOPE: CPT | Performed by: UROLOGY

## 2018-01-01 PROCEDURE — 99213 OFFICE O/P EST LOW 20 MIN: CPT | Performed by: PSYCHIATRY & NEUROLOGY

## 2018-01-01 PROCEDURE — 2580000003 HC RX 258: Performed by: INTERNAL MEDICINE

## 2018-01-01 PROCEDURE — 87186 SC STD MICRODIL/AGAR DIL: CPT

## 2018-01-01 PROCEDURE — C1760 CLOSURE DEV, VASC: HCPCS

## 2018-01-01 PROCEDURE — B2111ZZ FLUOROSCOPY OF MULTIPLE CORONARY ARTERIES USING LOW OSMOLAR CONTRAST: ICD-10-PCS | Performed by: INTERNAL MEDICINE

## 2018-01-01 PROCEDURE — C9600 PERC DRUG-EL COR STENT SING: HCPCS | Performed by: INTERNAL MEDICINE

## 2018-01-01 PROCEDURE — 85610 PROTHROMBIN TIME: CPT

## 2018-01-01 PROCEDURE — 93010 ELECTROCARDIOGRAM REPORT: CPT | Performed by: NUCLEAR MEDICINE

## 2018-01-01 PROCEDURE — 6360000002 HC RX W HCPCS: Performed by: NURSE ANESTHETIST, CERTIFIED REGISTERED

## 2018-01-01 PROCEDURE — G8598 ASA/ANTIPLAT THER USED: HCPCS | Performed by: NURSE PRACTITIONER

## 2018-01-01 PROCEDURE — 96372 THER/PROPH/DIAG INJ SC/IM: CPT

## 2018-01-01 PROCEDURE — G8427 DOCREV CUR MEDS BY ELIG CLIN: HCPCS | Performed by: PSYCHIATRY & NEUROLOGY

## 2018-01-01 PROCEDURE — 87086 URINE CULTURE/COLONY COUNT: CPT

## 2018-01-01 PROCEDURE — 99213 OFFICE O/P EST LOW 20 MIN: CPT | Performed by: FAMILY MEDICINE

## 2018-01-01 PROCEDURE — 3600000013 HC SURGERY LEVEL 3 ADDTL 15MIN: Performed by: UROLOGY

## 2018-01-01 PROCEDURE — G8420 CALC BMI NORM PARAMETERS: HCPCS | Performed by: PSYCHIATRY & NEUROLOGY

## 2018-01-01 PROCEDURE — 2580000003 HC RX 258: Performed by: FAMILY MEDICINE

## 2018-01-01 PROCEDURE — G8978 MOBILITY CURRENT STATUS: HCPCS

## 2018-01-01 PROCEDURE — 7100000010 HC PHASE II RECOVERY - FIRST 15 MIN: Performed by: UROLOGY

## 2018-01-01 PROCEDURE — 6360000002 HC RX W HCPCS: Performed by: FAMILY MEDICINE

## 2018-01-01 PROCEDURE — C1769 GUIDE WIRE: HCPCS

## 2018-01-01 PROCEDURE — 6360000002 HC RX W HCPCS

## 2018-01-01 PROCEDURE — G8599 NO ASA/ANTIPLAT THER USE RNG: HCPCS | Performed by: PSYCHIATRY & NEUROLOGY

## 2018-01-01 PROCEDURE — 1101F PT FALLS ASSESS-DOCD LE1/YR: CPT | Performed by: FAMILY MEDICINE

## 2018-01-01 PROCEDURE — 6370000000 HC RX 637 (ALT 250 FOR IP)

## 2018-01-01 PROCEDURE — 70496 CT ANGIOGRAPHY HEAD: CPT

## 2018-01-01 PROCEDURE — 99496 TRANSJ CARE MGMT HIGH F2F 7D: CPT | Performed by: FAMILY MEDICINE

## 2018-01-01 PROCEDURE — 6360000004 HC RX CONTRAST MEDICATION: Performed by: INTERNAL MEDICINE

## 2018-01-01 PROCEDURE — 2500000003 HC RX 250 WO HCPCS: Performed by: NURSE ANESTHETIST, CERTIFIED REGISTERED

## 2018-01-01 PROCEDURE — 2709999900 HC NON-CHARGEABLE SUPPLY

## 2018-01-01 PROCEDURE — G8482 FLU IMMUNIZE ORDER/ADMIN: HCPCS | Performed by: PSYCHIATRY & NEUROLOGY

## 2018-01-01 PROCEDURE — 86901 BLOOD TYPING SEROLOGIC RH(D): CPT

## 2018-01-01 PROCEDURE — C1874 STENT, COATED/COV W/DEL SYS: HCPCS

## 2018-01-01 PROCEDURE — 90674 CCIIV4 VAC NO PRSV 0.5 ML IM: CPT | Performed by: FAMILY MEDICINE

## 2018-01-01 PROCEDURE — 92978 ENDOLUMINL IVUS OCT C 1ST: CPT | Performed by: INTERNAL MEDICINE

## 2018-01-01 PROCEDURE — 3600000003 HC SURGERY LEVEL 3 BASE: Performed by: UROLOGY

## 2018-01-01 PROCEDURE — 81003 URINALYSIS AUTO W/O SCOPE: CPT

## 2018-01-01 PROCEDURE — 70498 CT ANGIOGRAPHY NECK: CPT

## 2018-01-01 PROCEDURE — 2500000003 HC RX 250 WO HCPCS

## 2018-01-01 PROCEDURE — C1725 CATH, TRANSLUMIN NON-LASER: HCPCS

## 2018-01-01 PROCEDURE — 027034Z DILATION OF CORONARY ARTERY, ONE ARTERY WITH DRUG-ELUTING INTRALUMINAL DEVICE, PERCUTANEOUS APPROACH: ICD-10-PCS | Performed by: INTERNAL MEDICINE

## 2018-01-01 PROCEDURE — 87077 CULTURE AEROBIC IDENTIFY: CPT

## 2018-01-01 PROCEDURE — 97116 GAIT TRAINING THERAPY: CPT

## 2018-01-01 PROCEDURE — G0008 ADMIN INFLUENZA VIRUS VAC: HCPCS | Performed by: FAMILY MEDICINE

## 2018-01-01 PROCEDURE — 69210 REMOVE IMPACTED EAR WAX UNI: CPT | Performed by: FAMILY MEDICINE

## 2018-01-01 PROCEDURE — 80048 BASIC METABOLIC PNL TOTAL CA: CPT

## 2018-01-01 PROCEDURE — 6370000000 HC RX 637 (ALT 250 FOR IP): Performed by: INTERNAL MEDICINE

## 2018-01-01 PROCEDURE — 4040F PNEUMOC VAC/ADMIN/RCVD: CPT | Performed by: NURSE PRACTITIONER

## 2018-01-01 PROCEDURE — 1123F ACP DISCUSS/DSCN MKR DOCD: CPT | Performed by: NURSE PRACTITIONER

## 2018-01-01 PROCEDURE — 1200000003 HC TELEMETRY R&B

## 2018-01-01 PROCEDURE — 86850 RBC ANTIBODY SCREEN: CPT

## 2018-01-01 PROCEDURE — 97110 THERAPEUTIC EXERCISES: CPT

## 2018-01-01 PROCEDURE — 99213 OFFICE O/P EST LOW 20 MIN: CPT | Performed by: NURSE PRACTITIONER

## 2018-01-01 PROCEDURE — G8427 DOCREV CUR MEDS BY ELIG CLIN: HCPCS | Performed by: NURSE PRACTITIONER

## 2018-01-01 PROCEDURE — 95819 EEG AWAKE AND ASLEEP: CPT

## 2018-01-01 PROCEDURE — 9990000010 HC NO CHARGE VISIT: Performed by: AUDIOLOGIST

## 2018-01-01 PROCEDURE — 2709999900 HC NON-CHARGEABLE SUPPLY: Performed by: UROLOGY

## 2018-01-01 PROCEDURE — 85027 COMPLETE CBC AUTOMATED: CPT

## 2018-01-01 PROCEDURE — 80061 LIPID PANEL: CPT

## 2018-01-01 PROCEDURE — 99285 EMERGENCY DEPT VISIT HI MDM: CPT

## 2018-01-01 PROCEDURE — 81000 URINALYSIS NONAUTO W/SCOPE: CPT | Performed by: FAMILY MEDICINE

## 2018-01-01 PROCEDURE — 7100000000 HC PACU RECOVERY - FIRST 15 MIN: Performed by: UROLOGY

## 2018-01-01 PROCEDURE — 97535 SELF CARE MNGMENT TRAINING: CPT

## 2018-01-01 PROCEDURE — G8979 MOBILITY GOAL STATUS: HCPCS

## 2018-01-01 PROCEDURE — 70450 CT HEAD/BRAIN W/O DYE: CPT

## 2018-01-01 PROCEDURE — 3700000000 HC ANESTHESIA ATTENDED CARE: Performed by: UROLOGY

## 2018-01-01 PROCEDURE — 52000 CYSTOURETHROSCOPY: CPT | Performed by: UROLOGY

## 2018-01-01 PROCEDURE — G8420 CALC BMI NORM PARAMETERS: HCPCS | Performed by: NURSE PRACTITIONER

## 2018-01-01 PROCEDURE — 85730 THROMBOPLASTIN TIME PARTIAL: CPT

## 2018-01-01 PROCEDURE — C1894 INTRO/SHEATH, NON-LASER: HCPCS

## 2018-01-01 PROCEDURE — G8987 SELF CARE CURRENT STATUS: HCPCS

## 2018-01-01 PROCEDURE — 52235 CYSTOSCOPY AND TREATMENT: CPT | Performed by: UROLOGY

## 2018-01-01 PROCEDURE — 81003 URINALYSIS AUTO W/O SCOPE: CPT | Performed by: NURSE PRACTITIONER

## 2018-01-01 PROCEDURE — G0378 HOSPITAL OBSERVATION PER HR: HCPCS

## 2018-01-01 PROCEDURE — 80053 COMPREHEN METABOLIC PANEL: CPT

## 2018-01-01 PROCEDURE — 83880 ASSAY OF NATRIURETIC PEPTIDE: CPT

## 2018-01-01 PROCEDURE — 3700000001 HC ADD 15 MINUTES (ANESTHESIA): Performed by: UROLOGY

## 2018-01-01 PROCEDURE — 99222 1ST HOSP IP/OBS MODERATE 55: CPT | Performed by: PSYCHIATRY & NEUROLOGY

## 2018-01-01 PROCEDURE — 93307 TTE W/O DOPPLER COMPLETE: CPT

## 2018-01-01 PROCEDURE — 1036F TOBACCO NON-USER: CPT | Performed by: NURSE PRACTITIONER

## 2018-01-01 PROCEDURE — 84484 ASSAY OF TROPONIN QUANT: CPT

## 2018-01-01 PROCEDURE — 6A751Z5 ULTRASOUND THERAPY OF HEART, MULTIPLE: ICD-10-PCS | Performed by: INTERNAL MEDICINE

## 2018-01-01 PROCEDURE — 94760 N-INVAS EAR/PLS OXIMETRY 1: CPT

## 2018-01-01 PROCEDURE — C1887 CATHETER, GUIDING: HCPCS

## 2018-01-01 PROCEDURE — 1111F DSCHRG MED/CURRENT MED MERGE: CPT | Performed by: PSYCHIATRY & NEUROLOGY

## 2018-01-01 PROCEDURE — 97166 OT EVAL MOD COMPLEX 45 MIN: CPT

## 2018-01-01 PROCEDURE — 81241 F5 GENE: CPT

## 2018-01-01 PROCEDURE — 99205 OFFICE O/P NEW HI 60 MIN: CPT | Performed by: PSYCHIATRY & NEUROLOGY

## 2018-01-01 PROCEDURE — 1101F PT FALLS ASSESS-DOCD LE1/YR: CPT | Performed by: NURSE PRACTITIONER

## 2018-01-01 PROCEDURE — 86900 BLOOD TYPING SEROLOGIC ABO: CPT

## 2018-01-01 PROCEDURE — B240ZZ3 ULTRASONOGRAPHY OF SINGLE CORONARY ARTERY, INTRAVASCULAR: ICD-10-PCS | Performed by: INTERNAL MEDICINE

## 2018-01-01 PROCEDURE — 97161 PT EVAL LOW COMPLEX 20 MIN: CPT

## 2018-01-01 PROCEDURE — C1753 CATH, INTRAVAS ULTRASOUND: HCPCS

## 2018-01-01 PROCEDURE — 2720000010 HC SURG SUPPLY STERILE: Performed by: UROLOGY

## 2018-01-01 PROCEDURE — 70551 MRI BRAIN STEM W/O DYE: CPT

## 2018-01-01 PROCEDURE — G8988 SELF CARE GOAL STATUS: HCPCS

## 2018-01-01 PROCEDURE — 7100000011 HC PHASE II RECOVERY - ADDTL 15 MIN: Performed by: UROLOGY

## 2018-01-01 PROCEDURE — G8598 ASA/ANTIPLAT THER USED: HCPCS | Performed by: PSYCHIATRY & NEUROLOGY

## 2018-01-01 PROCEDURE — 81001 URINALYSIS AUTO W/SCOPE: CPT

## 2018-01-01 PROCEDURE — 7100000001 HC PACU RECOVERY - ADDTL 15 MIN: Performed by: UROLOGY

## 2018-01-01 PROCEDURE — 71046 X-RAY EXAM CHEST 2 VIEWS: CPT

## 2018-01-01 PROCEDURE — 93005 ELECTROCARDIOGRAM TRACING: CPT | Performed by: INTERNAL MEDICINE

## 2018-01-01 PROCEDURE — 84443 ASSAY THYROID STIM HORMONE: CPT

## 2018-01-01 PROCEDURE — 2580000003 HC RX 258

## 2018-01-01 PROCEDURE — 93010 ELECTROCARDIOGRAM REPORT: CPT | Performed by: INTERNAL MEDICINE

## 2018-01-01 PROCEDURE — 4A023N7 MEASUREMENT OF CARDIAC SAMPLING AND PRESSURE, LEFT HEART, PERCUTANEOUS APPROACH: ICD-10-PCS | Performed by: INTERNAL MEDICINE

## 2018-01-01 RX ORDER — ASPIRIN 81 MG/1
81 TABLET ORAL DAILY
Qty: 30 TABLET | Refills: 3 | Status: ON HOLD | OUTPATIENT
Start: 2018-01-01 | End: 2019-01-01 | Stop reason: HOSPADM

## 2018-01-01 RX ORDER — NITROGLYCERIN 0.4 MG/1
TABLET SUBLINGUAL
Qty: 25 TABLET | Refills: 3 | Status: SHIPPED | OUTPATIENT
Start: 2018-01-01

## 2018-01-01 RX ORDER — ATROPINE SULFATE 0.4 MG/ML
0.5 AMPUL (ML) INJECTION
Status: ACTIVE | OUTPATIENT
Start: 2018-01-01 | End: 2018-01-01

## 2018-01-01 RX ORDER — MULTIVITAMIN WITH FOLIC ACID 400 MCG
1 TABLET ORAL DAILY
Status: DISCONTINUED | OUTPATIENT
Start: 2018-01-01 | End: 2018-01-01 | Stop reason: HOSPADM

## 2018-01-01 RX ORDER — HYDROCODONE BITARTRATE AND ACETAMINOPHEN 5; 325 MG/1; MG/1
2 TABLET ORAL EVERY 4 HOURS PRN
Status: DISCONTINUED | OUTPATIENT
Start: 2018-01-01 | End: 2018-01-01 | Stop reason: HOSPADM

## 2018-01-01 RX ORDER — VIT C/E/CUPERIC/ZINC/LUTEIN 226-90-0.8
1 CAPSULE ORAL 2 TIMES DAILY
Status: DISCONTINUED | OUTPATIENT
Start: 2018-01-01 | End: 2018-01-01 | Stop reason: RX

## 2018-01-01 RX ORDER — SODIUM CHLORIDE 0.9 % (FLUSH) 0.9 %
10 SYRINGE (ML) INJECTION EVERY 12 HOURS SCHEDULED
Status: DISCONTINUED | OUTPATIENT
Start: 2018-01-01 | End: 2018-01-01

## 2018-01-01 RX ORDER — SOLIFENACIN SUCCINATE 5 MG/1
TABLET, FILM COATED ORAL
Qty: 90 TABLET | Refills: 1 | Status: ON HOLD | OUTPATIENT
Start: 2018-01-01 | End: 2018-01-01 | Stop reason: SDUPTHER

## 2018-01-01 RX ORDER — FENTANYL CITRATE 50 UG/ML
INJECTION, SOLUTION INTRAMUSCULAR; INTRAVENOUS PRN
Status: DISCONTINUED | OUTPATIENT
Start: 2018-01-01 | End: 2018-01-01 | Stop reason: SDUPTHER

## 2018-01-01 RX ORDER — FINASTERIDE 5 MG/1
TABLET, FILM COATED ORAL
Qty: 90 TABLET | Refills: 0 | Status: SHIPPED | OUTPATIENT
Start: 2018-01-01 | End: 2018-01-01 | Stop reason: SDUPTHER

## 2018-01-01 RX ORDER — OXYBUTYNIN CHLORIDE 5 MG/1
5 TABLET ORAL 3 TIMES DAILY PRN
Status: DISCONTINUED | OUTPATIENT
Start: 2018-01-01 | End: 2018-01-01

## 2018-01-01 RX ORDER — HYDROXYZINE HYDROCHLORIDE 10 MG/1
10 TABLET, FILM COATED ORAL EVERY 6 HOURS PRN
Qty: 30 TABLET | Refills: 1 | Status: SHIPPED | OUTPATIENT
Start: 2018-01-01 | End: 2018-01-01

## 2018-01-01 RX ORDER — TRAMADOL HYDROCHLORIDE 50 MG/1
50 TABLET ORAL EVERY 6 HOURS PRN
Qty: 12 TABLET | Refills: 0 | Status: SHIPPED | OUTPATIENT
Start: 2018-01-01 | End: 2018-01-01

## 2018-01-01 RX ORDER — AMLODIPINE BESYLATE 2.5 MG/1
2.5 TABLET ORAL DAILY
Status: DISCONTINUED | OUTPATIENT
Start: 2018-01-01 | End: 2018-01-01 | Stop reason: HOSPADM

## 2018-01-01 RX ORDER — ROPINIROLE 0.5 MG/1
0.5 TABLET, FILM COATED ORAL 3 TIMES DAILY
Status: DISCONTINUED | OUTPATIENT
Start: 2018-01-01 | End: 2018-01-01 | Stop reason: HOSPADM

## 2018-01-01 RX ORDER — OMEPRAZOLE 20 MG/1
20 CAPSULE, DELAYED RELEASE ORAL DAILY
Qty: 90 CAPSULE | Refills: 1 | Status: ON HOLD | OUTPATIENT
Start: 2018-01-01 | End: 2019-01-01 | Stop reason: HOSPADM

## 2018-01-01 RX ORDER — CETIRIZINE HYDROCHLORIDE 10 MG/1
10 TABLET ORAL DAILY
Status: DISCONTINUED | OUTPATIENT
Start: 2018-01-01 | End: 2018-01-01 | Stop reason: HOSPADM

## 2018-01-01 RX ORDER — SODIUM CHLORIDE 0.9 % (FLUSH) 0.9 %
10 SYRINGE (ML) INJECTION EVERY 12 HOURS SCHEDULED
Status: DISCONTINUED | OUTPATIENT
Start: 2018-01-01 | End: 2018-01-01 | Stop reason: SDUPTHER

## 2018-01-01 RX ORDER — AMLODIPINE BESYLATE 2.5 MG/1
2.5 TABLET ORAL DAILY
Qty: 90 TABLET | Refills: 1 | Status: ON HOLD | OUTPATIENT
Start: 2018-01-01 | End: 2019-01-01 | Stop reason: HOSPADM

## 2018-01-01 RX ORDER — PREDNISONE 20 MG/1
20 TABLET ORAL 2 TIMES DAILY
Status: DISCONTINUED | OUTPATIENT
Start: 2018-01-01 | End: 2018-01-01 | Stop reason: HOSPADM

## 2018-01-01 RX ORDER — DEXTROSE, SODIUM CHLORIDE, AND POTASSIUM CHLORIDE 5; .45; .15 G/100ML; G/100ML; G/100ML
INJECTION INTRAVENOUS CONTINUOUS
Status: DISCONTINUED | OUTPATIENT
Start: 2018-01-01 | End: 2018-01-01

## 2018-01-01 RX ORDER — CEFUROXIME AXETIL 250 MG/1
250 TABLET ORAL EVERY 12 HOURS SCHEDULED
Qty: 10 TABLET | Refills: 0 | Status: SHIPPED | OUTPATIENT
Start: 2018-01-01 | End: 2018-01-01

## 2018-01-01 RX ORDER — ATORVASTATIN CALCIUM 10 MG/1
TABLET, FILM COATED ORAL
Refills: 3 | Status: ON HOLD | COMMUNITY
Start: 2018-01-01 | End: 2019-01-01 | Stop reason: HOSPADM

## 2018-01-01 RX ORDER — SODIUM CHLORIDE 9 MG/ML
INJECTION, SOLUTION INTRAVENOUS CONTINUOUS
Status: DISCONTINUED | OUTPATIENT
Start: 2018-01-01 | End: 2018-01-01 | Stop reason: SDUPTHER

## 2018-01-01 RX ORDER — LANOLIN ALCOHOL/MO/W.PET/CERES
400 CREAM (GRAM) TOPICAL DAILY
Status: ON HOLD | COMMUNITY
End: 2019-01-01 | Stop reason: HOSPADM

## 2018-01-01 RX ORDER — PREDNISONE 20 MG/1
TABLET ORAL
Qty: 8 TABLET | Refills: 0 | Status: SHIPPED | OUTPATIENT
Start: 2018-01-01 | End: 2019-01-01 | Stop reason: ALTCHOICE

## 2018-01-01 RX ORDER — ONDANSETRON 2 MG/ML
4 INJECTION INTRAMUSCULAR; INTRAVENOUS EVERY 6 HOURS PRN
Status: DISCONTINUED | OUTPATIENT
Start: 2018-01-01 | End: 2018-01-01 | Stop reason: HOSPADM

## 2018-01-01 RX ORDER — FINASTERIDE 5 MG/1
5 TABLET, FILM COATED ORAL DAILY
Status: DISCONTINUED | OUTPATIENT
Start: 2018-01-01 | End: 2018-01-01 | Stop reason: HOSPADM

## 2018-01-01 RX ORDER — DEXAMETHASONE SODIUM PHOSPHATE 4 MG/ML
INJECTION, SOLUTION INTRA-ARTICULAR; INTRALESIONAL; INTRAMUSCULAR; INTRAVENOUS; SOFT TISSUE PRN
Status: DISCONTINUED | OUTPATIENT
Start: 2018-01-01 | End: 2018-01-01 | Stop reason: SDUPTHER

## 2018-01-01 RX ORDER — TAMSULOSIN HYDROCHLORIDE 0.4 MG/1
CAPSULE ORAL
Qty: 90 CAPSULE | Refills: 3 | Status: ON HOLD | OUTPATIENT
Start: 2018-01-01 | End: 2019-01-01 | Stop reason: HOSPADM

## 2018-01-01 RX ORDER — SODIUM CHLORIDE 9 MG/ML
INJECTION, SOLUTION INTRAVENOUS CONTINUOUS
Status: DISCONTINUED | OUTPATIENT
Start: 2018-01-01 | End: 2018-01-01 | Stop reason: HOSPADM

## 2018-01-01 RX ORDER — ASPIRIN 81 MG/1
81 TABLET ORAL DAILY
Status: DISCONTINUED | OUTPATIENT
Start: 2018-01-01 | End: 2018-01-01 | Stop reason: SDUPTHER

## 2018-01-01 RX ORDER — TROSPIUM CHLORIDE 20 MG/1
20 TABLET, FILM COATED ORAL DAILY
Status: DISCONTINUED | OUTPATIENT
Start: 2018-01-01 | End: 2018-01-01 | Stop reason: HOSPADM

## 2018-01-01 RX ORDER — ATORVASTATIN CALCIUM 10 MG/1
TABLET, FILM COATED ORAL
Qty: 90 TABLET | Refills: 1 | Status: ON HOLD | OUTPATIENT
Start: 2018-01-01 | End: 2018-01-01 | Stop reason: ALTCHOICE

## 2018-01-01 RX ORDER — POTASSIUM CHLORIDE 7.45 MG/ML
10 INJECTION INTRAVENOUS PRN
Status: DISCONTINUED | OUTPATIENT
Start: 2018-01-01 | End: 2018-01-01 | Stop reason: HOSPADM

## 2018-01-01 RX ORDER — OMEPRAZOLE 20 MG/1
20 CAPSULE, DELAYED RELEASE ORAL DAILY
Qty: 30 CAPSULE | Refills: 5 | Status: SHIPPED | OUTPATIENT
Start: 2018-01-01 | End: 2018-01-01 | Stop reason: SDUPTHER

## 2018-01-01 RX ORDER — LIDOCAINE HYDROCHLORIDE 20 MG/ML
INJECTION, SOLUTION INFILTRATION; PERINEURAL PRN
Status: DISCONTINUED | OUTPATIENT
Start: 2018-01-01 | End: 2018-01-01 | Stop reason: SDUPTHER

## 2018-01-01 RX ORDER — HYDROCODONE BITARTRATE AND ACETAMINOPHEN 5; 325 MG/1; MG/1
1 TABLET ORAL EVERY 4 HOURS PRN
Status: DISCONTINUED | OUTPATIENT
Start: 2018-01-01 | End: 2018-01-01 | Stop reason: HOSPADM

## 2018-01-01 RX ORDER — ROPINIROLE 0.5 MG/1
0.5 TABLET, FILM COATED ORAL 3 TIMES DAILY
Qty: 270 TABLET | Refills: 1 | Status: SHIPPED | OUTPATIENT
Start: 2018-01-01 | End: 2019-01-01 | Stop reason: SDUPTHER

## 2018-01-01 RX ORDER — SODIUM CHLORIDE 0.9 % (FLUSH) 0.9 %
10 SYRINGE (ML) INJECTION EVERY 12 HOURS SCHEDULED
Status: DISCONTINUED | OUTPATIENT
Start: 2018-01-01 | End: 2018-01-01 | Stop reason: HOSPADM

## 2018-01-01 RX ORDER — ACETAMINOPHEN 325 MG/1
650 TABLET ORAL EVERY 4 HOURS PRN
Status: DISCONTINUED | OUTPATIENT
Start: 2018-01-01 | End: 2018-01-01 | Stop reason: HOSPADM

## 2018-01-01 RX ORDER — OXYBUTYNIN CHLORIDE 5 MG/1
5 TABLET ORAL 3 TIMES DAILY PRN
Qty: 60 TABLET | Refills: 0 | Status: ON HOLD | OUTPATIENT
Start: 2018-01-01 | End: 2018-01-01 | Stop reason: ALTCHOICE

## 2018-01-01 RX ORDER — ROPINIROLE 0.5 MG/1
0.5 TABLET, FILM COATED ORAL 3 TIMES DAILY
COMMUNITY
End: 2018-01-01 | Stop reason: SDUPTHER

## 2018-01-01 RX ORDER — ASPIRIN 81 MG/1
81 TABLET ORAL DAILY
Status: DISCONTINUED | OUTPATIENT
Start: 2018-01-01 | End: 2018-01-01 | Stop reason: HOSPADM

## 2018-01-01 RX ORDER — SODIUM CHLORIDE 0.9 % (FLUSH) 0.9 %
10 SYRINGE (ML) INJECTION PRN
Status: DISCONTINUED | OUTPATIENT
Start: 2018-01-01 | End: 2018-01-01

## 2018-01-01 RX ORDER — CEFUROXIME AXETIL 250 MG/1
250 TABLET ORAL EVERY 12 HOURS SCHEDULED
Status: DISCONTINUED | OUTPATIENT
Start: 2018-01-01 | End: 2018-01-01 | Stop reason: HOSPADM

## 2018-01-01 RX ORDER — ONDANSETRON 4 MG/1
4 TABLET, ORALLY DISINTEGRATING ORAL
Status: DISCONTINUED | OUTPATIENT
Start: 2018-01-01 | End: 2018-01-01 | Stop reason: HOSPADM

## 2018-01-01 RX ORDER — ACETAMINOPHEN 325 MG/1
650 TABLET ORAL EVERY 4 HOURS PRN
Status: DISCONTINUED | OUTPATIENT
Start: 2018-01-01 | End: 2018-01-01 | Stop reason: SDUPTHER

## 2018-01-01 RX ORDER — ALPRAZOLAM 0.5 MG/1
0.5 TABLET ORAL
Status: ACTIVE | OUTPATIENT
Start: 2018-01-01 | End: 2018-01-01

## 2018-01-01 RX ORDER — TAMSULOSIN HYDROCHLORIDE 0.4 MG/1
0.4 CAPSULE ORAL DAILY
Status: DISCONTINUED | OUTPATIENT
Start: 2018-01-01 | End: 2018-01-01 | Stop reason: HOSPADM

## 2018-01-01 RX ORDER — SUCCINYLCHOLINE CHLORIDE 20 MG/ML
INJECTION INTRAMUSCULAR; INTRAVENOUS PRN
Status: DISCONTINUED | OUTPATIENT
Start: 2018-01-01 | End: 2018-01-01 | Stop reason: SDUPTHER

## 2018-01-01 RX ORDER — ATORVASTATIN CALCIUM 10 MG/1
10 TABLET, FILM COATED ORAL DAILY
Status: DISCONTINUED | OUTPATIENT
Start: 2018-01-01 | End: 2018-01-01

## 2018-01-01 RX ORDER — PANTOPRAZOLE SODIUM 40 MG/1
40 TABLET, DELAYED RELEASE ORAL
Status: DISCONTINUED | OUTPATIENT
Start: 2018-01-01 | End: 2018-01-01 | Stop reason: HOSPADM

## 2018-01-01 RX ORDER — CIPROFLOXACIN 500 MG/1
500 TABLET, FILM COATED ORAL 2 TIMES DAILY
Qty: 6 TABLET | Refills: 0 | Status: SHIPPED | OUTPATIENT
Start: 2018-01-01 | End: 2018-01-01

## 2018-01-01 RX ORDER — PROPOFOL 10 MG/ML
INJECTION, EMULSION INTRAVENOUS PRN
Status: DISCONTINUED | OUTPATIENT
Start: 2018-01-01 | End: 2018-01-01 | Stop reason: SDUPTHER

## 2018-01-01 RX ORDER — NITROGLYCERIN 0.4 MG/1
0.4 TABLET SUBLINGUAL EVERY 5 MIN PRN
Status: DISCONTINUED | OUTPATIENT
Start: 2018-01-01 | End: 2018-01-01 | Stop reason: HOSPADM

## 2018-01-01 RX ORDER — PREDNISONE 20 MG/1
TABLET ORAL
Qty: 18 TABLET | Refills: 0 | Status: SHIPPED | OUTPATIENT
Start: 2018-01-01 | End: 2018-01-01 | Stop reason: ALTCHOICE

## 2018-01-01 RX ORDER — ONDANSETRON 2 MG/ML
4 INJECTION INTRAMUSCULAR; INTRAVENOUS EVERY 6 HOURS PRN
Status: DISCONTINUED | OUTPATIENT
Start: 2018-01-01 | End: 2018-01-01 | Stop reason: SDUPTHER

## 2018-01-01 RX ORDER — DIPHENHYDRAMINE HCL 25 MG
25 TABLET ORAL
Status: ACTIVE | OUTPATIENT
Start: 2018-01-01 | End: 2018-01-01

## 2018-01-01 RX ORDER — SOLIFENACIN SUCCINATE 5 MG/1
TABLET, FILM COATED ORAL
Qty: 90 TABLET | Refills: 1 | Status: SHIPPED | OUTPATIENT
Start: 2018-01-01 | End: 2019-01-01 | Stop reason: SDUPTHER

## 2018-01-01 RX ORDER — SODIUM CHLORIDE 0.9 % (FLUSH) 0.9 %
10 SYRINGE (ML) INJECTION PRN
Status: DISCONTINUED | OUTPATIENT
Start: 2018-01-01 | End: 2018-01-01 | Stop reason: HOSPADM

## 2018-01-01 RX ORDER — CIPROFLOXACIN 2 MG/ML
400 INJECTION, SOLUTION INTRAVENOUS
Status: COMPLETED | OUTPATIENT
Start: 2018-01-01 | End: 2018-01-01

## 2018-01-01 RX ORDER — DIPHENHYDRAMINE HCL 25 MG
50 TABLET ORAL ONCE
Status: DISCONTINUED | OUTPATIENT
Start: 2018-01-01 | End: 2018-01-01 | Stop reason: HOSPADM

## 2018-01-01 RX ORDER — OMEGA-3-ACID ETHYL ESTERS 1 G/1
1 CAPSULE, LIQUID FILLED ORAL DAILY
Status: DISCONTINUED | OUTPATIENT
Start: 2018-01-01 | End: 2018-01-01 | Stop reason: HOSPADM

## 2018-01-01 RX ORDER — ASPIRIN 325 MG
325 TABLET ORAL ONCE
Status: COMPLETED | OUTPATIENT
Start: 2018-01-01 | End: 2018-01-01

## 2018-01-01 RX ADMIN — THERA TABS 1 TABLET: TAB at 08:40

## 2018-01-01 RX ADMIN — DEXAMETHASONE SODIUM PHOSPHATE 8 MG: 4 INJECTION, SOLUTION INTRAMUSCULAR; INTRAVENOUS at 07:47

## 2018-01-01 RX ADMIN — POTASSIUM CHLORIDE, DEXTROSE MONOHYDRATE AND SODIUM CHLORIDE: 150; 5; 450 INJECTION, SOLUTION INTRAVENOUS at 04:06

## 2018-01-01 RX ADMIN — ROPINIROLE HYDROCHLORIDE 0.5 MG: 0.5 TABLET, FILM COATED ORAL at 12:47

## 2018-01-01 RX ADMIN — Medication 10 ML: at 20:53

## 2018-01-01 RX ADMIN — PREDNISONE 20 MG: 20 TABLET ORAL at 17:43

## 2018-01-01 RX ADMIN — FENTANYL CITRATE 50 MCG: 50 INJECTION INTRAMUSCULAR; INTRAVENOUS at 07:47

## 2018-01-01 RX ADMIN — FINASTERIDE 5 MG: 5 TABLET, FILM COATED ORAL at 12:04

## 2018-01-01 RX ADMIN — CARBIDOPA AND LEVODOPA 1 TABLET: 25; 100 TABLET ORAL at 08:41

## 2018-01-01 RX ADMIN — ROPINIROLE HYDROCHLORIDE 0.5 MG: 0.5 TABLET, FILM COATED ORAL at 22:14

## 2018-01-01 RX ADMIN — POTASSIUM CHLORIDE, DEXTROSE MONOHYDRATE AND SODIUM CHLORIDE: 150; 5; 450 INJECTION, SOLUTION INTRAVENOUS at 23:12

## 2018-01-01 RX ADMIN — OMEGA-3-ACID ETHYL ESTERS 1 G: 1 CAPSULE, LIQUID FILLED ORAL at 09:57

## 2018-01-01 RX ADMIN — SODIUM CHLORIDE: 9 INJECTION, SOLUTION INTRAVENOUS at 06:23

## 2018-01-01 RX ADMIN — Medication 100 MG: at 07:47

## 2018-01-01 RX ADMIN — AMLODIPINE BESYLATE 2.5 MG: 2.5 TABLET ORAL at 12:00

## 2018-01-01 RX ADMIN — CEFUROXIME AXETIL 250 MG: 250 TABLET ORAL at 20:11

## 2018-01-01 RX ADMIN — ROPINIROLE HYDROCHLORIDE 0.5 MG: 0.5 TABLET, FILM COATED ORAL at 09:59

## 2018-01-01 RX ADMIN — CEFUROXIME AXETIL 250 MG: 250 TABLET ORAL at 20:51

## 2018-01-01 RX ADMIN — CARBIDOPA AND LEVODOPA 1 TABLET: 25; 100 TABLET ORAL at 20:51

## 2018-01-01 RX ADMIN — TAMSULOSIN HYDROCHLORIDE 0.4 MG: 0.4 CAPSULE ORAL at 09:59

## 2018-01-01 RX ADMIN — ENOXAPARIN SODIUM 40 MG: 40 INJECTION SUBCUTANEOUS at 12:06

## 2018-01-01 RX ADMIN — FINASTERIDE 5 MG: 5 TABLET, FILM COATED ORAL at 10:36

## 2018-01-01 RX ADMIN — PANTOPRAZOLE SODIUM 40 MG: 40 TABLET, DELAYED RELEASE ORAL at 17:08

## 2018-01-01 RX ADMIN — FINASTERIDE 5 MG: 5 TABLET, FILM COATED ORAL at 09:57

## 2018-01-01 RX ADMIN — TROSPIUM CHLORIDE 20 MG: 20 TABLET ORAL at 22:11

## 2018-01-01 RX ADMIN — PANTOPRAZOLE SODIUM 40 MG: 40 TABLET, DELAYED RELEASE ORAL at 12:06

## 2018-01-01 RX ADMIN — ENOXAPARIN SODIUM 40 MG: 40 INJECTION SUBCUTANEOUS at 08:37

## 2018-01-01 RX ADMIN — ROPINIROLE HYDROCHLORIDE 0.5 MG: 0.5 TABLET, FILM COATED ORAL at 15:15

## 2018-01-01 RX ADMIN — ROPINIROLE HYDROCHLORIDE 0.5 MG: 0.5 TABLET, FILM COATED ORAL at 13:11

## 2018-01-01 RX ADMIN — POTASSIUM CHLORIDE, DEXTROSE MONOHYDRATE AND SODIUM CHLORIDE: 150; 5; 450 INJECTION, SOLUTION INTRAVENOUS at 00:36

## 2018-01-01 RX ADMIN — ROPINIROLE HYDROCHLORIDE 0.5 MG: 0.5 TABLET, FILM COATED ORAL at 08:41

## 2018-01-01 RX ADMIN — PANTOPRAZOLE SODIUM 40 MG: 40 TABLET, DELAYED RELEASE ORAL at 07:16

## 2018-01-01 RX ADMIN — ROPINIROLE HYDROCHLORIDE 0.5 MG: 0.5 TABLET, FILM COATED ORAL at 15:44

## 2018-01-01 RX ADMIN — THERA TABS 1 TABLET: TAB at 09:00

## 2018-01-01 RX ADMIN — CARBIDOPA AND LEVODOPA 1 TABLET: 25; 100 TABLET ORAL at 13:10

## 2018-01-01 RX ADMIN — Medication 10 ML: at 22:12

## 2018-01-01 RX ADMIN — CETIRIZINE HYDROCHLORIDE 10 MG: 10 TABLET, FILM COATED ORAL at 09:58

## 2018-01-01 RX ADMIN — PROPOFOL 100 MG: 10 INJECTION, EMULSION INTRAVENOUS at 07:47

## 2018-01-01 RX ADMIN — TROSPIUM CHLORIDE 20 MG: 20 TABLET ORAL at 10:38

## 2018-01-01 RX ADMIN — TAMSULOSIN HYDROCHLORIDE 0.4 MG: 0.4 CAPSULE ORAL at 17:08

## 2018-01-01 RX ADMIN — AMLODIPINE BESYLATE 2.5 MG: 2.5 TABLET ORAL at 08:40

## 2018-01-01 RX ADMIN — ROPINIROLE HYDROCHLORIDE 0.5 MG: 0.5 TABLET, FILM COATED ORAL at 21:06

## 2018-01-01 RX ADMIN — CARBIDOPA AND LEVODOPA 1 TABLET: 25; 100 TABLET ORAL at 22:12

## 2018-01-01 RX ADMIN — PANTOPRAZOLE SODIUM 40 MG: 40 TABLET, DELAYED RELEASE ORAL at 06:55

## 2018-01-01 RX ADMIN — CARBIDOPA AND LEVODOPA 1 TABLET: 25; 100 TABLET ORAL at 17:08

## 2018-01-01 RX ADMIN — THERA TABS 1 TABLET: TAB at 12:07

## 2018-01-01 RX ADMIN — IOPAMIDOL 80 ML: 755 INJECTION, SOLUTION INTRAVENOUS at 16:35

## 2018-01-01 RX ADMIN — POTASSIUM CHLORIDE, DEXTROSE MONOHYDRATE AND SODIUM CHLORIDE: 150; 5; 450 INJECTION, SOLUTION INTRAVENOUS at 15:13

## 2018-01-01 RX ADMIN — TAMSULOSIN HYDROCHLORIDE 0.4 MG: 0.4 CAPSULE ORAL at 08:39

## 2018-01-01 RX ADMIN — CEFUROXIME AXETIL 250 MG: 250 TABLET ORAL at 22:11

## 2018-01-01 RX ADMIN — CETIRIZINE HYDROCHLORIDE 10 MG: 10 TABLET, FILM COATED ORAL at 08:39

## 2018-01-01 RX ADMIN — CEFUROXIME AXETIL 250 MG: 250 TABLET ORAL at 09:57

## 2018-01-01 RX ADMIN — AMLODIPINE BESYLATE 2.5 MG: 2.5 TABLET ORAL at 09:57

## 2018-01-01 RX ADMIN — THERA TABS 1 TABLET: TAB at 10:00

## 2018-01-01 RX ADMIN — Medication 10 ML: at 20:12

## 2018-01-01 RX ADMIN — ROPINIROLE HYDROCHLORIDE 0.5 MG: 0.5 TABLET, FILM COATED ORAL at 08:40

## 2018-01-01 RX ADMIN — TICAGRELOR 90 MG: 90 TABLET ORAL at 20:16

## 2018-01-01 RX ADMIN — TAMSULOSIN HYDROCHLORIDE 0.4 MG: 0.4 CAPSULE ORAL at 10:37

## 2018-01-01 RX ADMIN — CIPROFLOXACIN 400 MG: 2 INJECTION, SOLUTION INTRAVENOUS at 07:52

## 2018-01-01 RX ADMIN — TROSPIUM CHLORIDE 20 MG: 20 TABLET ORAL at 08:39

## 2018-01-01 RX ADMIN — CARBIDOPA AND LEVODOPA 1 TABLET: 25; 100 TABLET ORAL at 13:46

## 2018-01-01 RX ADMIN — CARBIDOPA AND LEVODOPA 1 TABLET: 25; 100 TABLET ORAL at 09:57

## 2018-01-01 RX ADMIN — PANTOPRAZOLE SODIUM 40 MG: 40 TABLET, DELAYED RELEASE ORAL at 09:58

## 2018-01-01 RX ADMIN — Medication 10 ML: at 08:38

## 2018-01-01 RX ADMIN — CARBIDOPA AND LEVODOPA 1 TABLET: 25; 100 TABLET ORAL at 15:44

## 2018-01-01 RX ADMIN — ASPIRIN 81 MG: 81 TABLET, COATED ORAL at 09:57

## 2018-01-01 RX ADMIN — CEFUROXIME AXETIL 250 MG: 250 TABLET ORAL at 09:00

## 2018-01-01 RX ADMIN — IOPAMIDOL 80 ML: 755 INJECTION, SOLUTION INTRAVENOUS at 13:32

## 2018-01-01 RX ADMIN — Medication 10 ML: at 09:59

## 2018-01-01 RX ADMIN — ENOXAPARIN SODIUM 40 MG: 40 INJECTION SUBCUTANEOUS at 09:57

## 2018-01-01 RX ADMIN — ROPINIROLE HYDROCHLORIDE 0.5 MG: 0.5 TABLET, FILM COATED ORAL at 17:08

## 2018-01-01 RX ADMIN — ASPIRIN 325 MG: 325 TABLET ORAL at 08:41

## 2018-01-01 RX ADMIN — AMLODIPINE BESYLATE 2.5 MG: 2.5 TABLET ORAL at 09:00

## 2018-01-01 RX ADMIN — ROPINIROLE HYDROCHLORIDE 0.5 MG: 0.5 TABLET, FILM COATED ORAL at 20:11

## 2018-01-01 RX ADMIN — CARBIDOPA AND LEVODOPA 1 TABLET: 25; 100 TABLET ORAL at 15:15

## 2018-01-01 RX ADMIN — SODIUM CHLORIDE: 9 INJECTION, SOLUTION INTRAVENOUS at 06:34

## 2018-01-01 RX ADMIN — ROPINIROLE HYDROCHLORIDE 0.5 MG: 0.5 TABLET, FILM COATED ORAL at 13:46

## 2018-01-01 RX ADMIN — ROPINIROLE HYDROCHLORIDE 0.5 MG: 0.5 TABLET, FILM COATED ORAL at 12:05

## 2018-01-01 RX ADMIN — TROSPIUM CHLORIDE 20 MG: 20 TABLET ORAL at 17:07

## 2018-01-01 RX ADMIN — TICAGRELOR 90 MG: 90 TABLET ORAL at 08:39

## 2018-01-01 RX ADMIN — Medication 10 ML: at 00:37

## 2018-01-01 RX ADMIN — CEFUROXIME AXETIL 250 MG: 250 TABLET ORAL at 08:40

## 2018-01-01 RX ADMIN — CARBIDOPA AND LEVODOPA 1 TABLET: 25; 100 TABLET ORAL at 20:11

## 2018-01-01 RX ADMIN — ROPINIROLE HYDROCHLORIDE 0.5 MG: 0.5 TABLET, FILM COATED ORAL at 23:07

## 2018-01-01 RX ADMIN — ASPIRIN 81 MG: 81 TABLET, COATED ORAL at 12:01

## 2018-01-01 RX ADMIN — CARBIDOPA AND LEVODOPA 1 TABLET: 25; 100 TABLET ORAL at 12:02

## 2018-01-01 RX ADMIN — OMEGA-3-ACID ETHYL ESTERS 1 G: 1 CAPSULE, LIQUID FILLED ORAL at 08:38

## 2018-01-01 RX ADMIN — FINASTERIDE 5 MG: 5 TABLET, FILM COATED ORAL at 17:07

## 2018-01-01 RX ADMIN — OMEGA-3-ACID ETHYL ESTERS 1 G: 1 CAPSULE, LIQUID FILLED ORAL at 12:06

## 2018-01-01 RX ADMIN — FINASTERIDE 5 MG: 5 TABLET, FILM COATED ORAL at 08:39

## 2018-01-01 RX ADMIN — CETIRIZINE HYDROCHLORIDE 10 MG: 10 TABLET, FILM COATED ORAL at 12:03

## 2018-01-01 RX ADMIN — OMEGA-3-ACID ETHYL ESTERS 1 G: 1 CAPSULE, LIQUID FILLED ORAL at 10:40

## 2018-01-01 RX ADMIN — LIDOCAINE HYDROCHLORIDE 60 MG: 20 INJECTION, SOLUTION INFILTRATION; PERINEURAL at 07:47

## 2018-01-01 RX ADMIN — POTASSIUM CHLORIDE, DEXTROSE MONOHYDRATE AND SODIUM CHLORIDE: 150; 5; 450 INJECTION, SOLUTION INTRAVENOUS at 20:14

## 2018-01-01 RX ADMIN — ACETAMINOPHEN 650 MG: 325 TABLET ORAL at 16:16

## 2018-01-01 RX ADMIN — ASPIRIN 81 MG: 81 TABLET, COATED ORAL at 09:00

## 2018-01-01 RX ADMIN — TICAGRELOR 90 MG: 90 TABLET ORAL at 19:29

## 2018-01-01 RX ADMIN — ENOXAPARIN SODIUM 40 MG: 40 INJECTION SUBCUTANEOUS at 09:00

## 2018-01-01 RX ADMIN — CARBIDOPA AND LEVODOPA 1 TABLET: 25; 100 TABLET ORAL at 21:06

## 2018-01-01 RX ADMIN — CARBIDOPA AND LEVODOPA 1 TABLET: 25; 100 TABLET ORAL at 13:25

## 2018-01-01 RX ADMIN — AMLODIPINE BESYLATE 2.5 MG: 2.5 TABLET ORAL at 08:41

## 2018-01-01 RX ADMIN — POTASSIUM CHLORIDE, DEXTROSE MONOHYDRATE AND SODIUM CHLORIDE: 150; 5; 450 INJECTION, SOLUTION INTRAVENOUS at 08:17

## 2018-01-01 RX ADMIN — CEFUROXIME AXETIL 250 MG: 250 TABLET ORAL at 21:06

## 2018-01-01 RX ADMIN — ASPIRIN 81 MG: 81 TABLET, COATED ORAL at 08:39

## 2018-01-01 RX ADMIN — CETIRIZINE HYDROCHLORIDE 10 MG: 10 TABLET, FILM COATED ORAL at 10:35

## 2018-01-01 RX ADMIN — TAMSULOSIN HYDROCHLORIDE 0.4 MG: 0.4 CAPSULE ORAL at 12:05

## 2018-01-01 RX ADMIN — CARBIDOPA AND LEVODOPA 1 TABLET: 25; 100 TABLET ORAL at 08:39

## 2018-01-01 RX ADMIN — Medication 10 ML: at 09:00

## 2018-01-01 ASSESSMENT — PAIN SCALES - GENERAL
PAINLEVEL_OUTOF10: 0
PAINLEVEL_OUTOF10: 2
PAINLEVEL_OUTOF10: 0
PAINLEVEL_OUTOF10: 3
PAINLEVEL_OUTOF10: 0

## 2018-01-01 ASSESSMENT — ENCOUNTER SYMPTOMS
BACK PAIN: 0
ABDOMINAL PAIN: 0
NAUSEA: 0
SHORTNESS OF BREATH: 0
EYE PAIN: 0
COUGH: 0
CONSTIPATION: 0
BLOOD IN STOOL: 0
SHORTNESS OF BREATH: 0
BLOOD IN STOOL: 0
CONSTIPATION: 0
EYE PAIN: 0
CHEST TIGHTNESS: 0
EYE DISCHARGE: 0
TROUBLE SWALLOWING: 0
SORE THROAT: 0
COUGH: 0
COUGH: 0
NAUSEA: 0
SORE THROAT: 0
CONSTIPATION: 0
ABDOMINAL PAIN: 0
EYE REDNESS: 0
TROUBLE SWALLOWING: 0
CHEST TIGHTNESS: 0
NAUSEA: 0
COUGH: 0
BACK PAIN: 0
ABDOMINAL PAIN: 0
SHORTNESS OF BREATH: 0
CONSTIPATION: 0
BLOOD IN STOOL: 0
CHEST TIGHTNESS: 0
COUGH: 0
COUGH: 0
SHORTNESS OF BREATH: 0
CONSTIPATION: 0
EYE PAIN: 0
BLOOD IN STOOL: 0
ABDOMINAL PAIN: 0
SORE THROAT: 0
ORTHOPNEA: 0
BACK PAIN: 0
NAUSEA: 0
BLOOD IN STOOL: 0
CHEST TIGHTNESS: 0
SORE THROAT: 0
ABDOMINAL PAIN: 0
EYE PAIN: 0
SORE THROAT: 0
RHINORRHEA: 0
DIARRHEA: 0
SHORTNESS OF BREATH: 0
TROUBLE SWALLOWING: 0
BACK PAIN: 0
CHEST TIGHTNESS: 0
ABDOMINAL PAIN: 0
SORE THROAT: 0
SHORTNESS OF BREATH: 0
VOMITING: 0
WHEEZING: 0
TROUBLE SWALLOWING: 0
NAUSEA: 0
NAUSEA: 0
EYE PAIN: 0
TROUBLE SWALLOWING: 0

## 2018-01-01 ASSESSMENT — PULMONARY FUNCTION TESTS
PIF_VALUE: 14
PIF_VALUE: 15
PIF_VALUE: 1
PIF_VALUE: 15
PIF_VALUE: 15
PIF_VALUE: 28
PIF_VALUE: 15
PIF_VALUE: 15
PIF_VALUE: 3
PIF_VALUE: 15
PIF_VALUE: 15
PIF_VALUE: 2
PIF_VALUE: 1
PIF_VALUE: 1
PIF_VALUE: 15
PIF_VALUE: 15
PIF_VALUE: 1
PIF_VALUE: 21
PIF_VALUE: 16
PIF_VALUE: 10
PIF_VALUE: 1
PIF_VALUE: 15
PIF_VALUE: 18
PIF_VALUE: 16
PIF_VALUE: 15
PIF_VALUE: 1
PIF_VALUE: 15
PIF_VALUE: 1
PIF_VALUE: 15
PIF_VALUE: 1
PIF_VALUE: 1
PIF_VALUE: 15
PIF_VALUE: 2

## 2018-01-01 ASSESSMENT — PAIN DESCRIPTION - LOCATION: LOCATION: GROIN;HIP

## 2018-01-01 ASSESSMENT — PATIENT HEALTH QUESTIONNAIRE - PHQ9
SUM OF ALL RESPONSES TO PHQ QUESTIONS 1-9: 0
SUM OF ALL RESPONSES TO PHQ9 QUESTIONS 1 & 2: 0
1. LITTLE INTEREST OR PLEASURE IN DOING THINGS: 0
2. FEELING DOWN, DEPRESSED OR HOPELESS: 0

## 2018-01-01 ASSESSMENT — PAIN DESCRIPTION - PAIN TYPE: TYPE: ACUTE PAIN

## 2018-01-01 ASSESSMENT — PAIN DESCRIPTION - ORIENTATION: ORIENTATION: RIGHT

## 2018-01-27 RX ORDER — SOLIFENACIN SUCCINATE 5 MG/1
TABLET, FILM COATED ORAL
Qty: 90 TABLET | Refills: 1 | Status: SHIPPED | OUTPATIENT
Start: 2018-01-27 | End: 2018-01-01 | Stop reason: SDUPTHER

## 2018-02-06 ENCOUNTER — OFFICE VISIT (OUTPATIENT)
Dept: FAMILY MEDICINE CLINIC | Age: 83
End: 2018-02-06

## 2018-02-06 VITALS
BODY MASS INDEX: 22.9 KG/M2 | RESPIRATION RATE: 16 BRPM | DIASTOLIC BLOOD PRESSURE: 72 MMHG | HEIGHT: 70 IN | HEART RATE: 64 BPM | SYSTOLIC BLOOD PRESSURE: 134 MMHG | WEIGHT: 160 LBS

## 2018-02-06 DIAGNOSIS — N31.9 NEUROGENIC BLADDER: ICD-10-CM

## 2018-02-06 DIAGNOSIS — K21.9 GASTROESOPHAGEAL REFLUX DISEASE WITHOUT ESOPHAGITIS: Primary | ICD-10-CM

## 2018-02-06 PROCEDURE — 99213 OFFICE O/P EST LOW 20 MIN: CPT | Performed by: FAMILY MEDICINE

## 2018-02-06 RX ORDER — OMEPRAZOLE 20 MG/1
20 CAPSULE, DELAYED RELEASE ORAL DAILY
Qty: 30 CAPSULE | Refills: 1 | Status: SHIPPED | OUTPATIENT
Start: 2018-02-06 | End: 2018-03-02 | Stop reason: SDUPTHER

## 2018-02-06 ASSESSMENT — ENCOUNTER SYMPTOMS
CONSTIPATION: 0
HEARTBURN: 1
ABDOMINAL PAIN: 0
BLOOD IN STOOL: 0
BELCHING: 0
COUGH: 0
CHEST TIGHTNESS: 0
SHORTNESS OF BREATH: 0
NAUSEA: 0
EYE PAIN: 0
SORE THROAT: 0
BACK PAIN: 0
TROUBLE SWALLOWING: 0

## 2018-02-06 NOTE — PROGRESS NOTES
Subjective:      Patient ID: Gregorio Joe is a 80 y.o. male. parkinsons  Stable       2  To   Times  A week       Gastroesophageal Reflux   He complains of heartburn. He reports no abdominal pain, no belching, no chest pain, no coughing, no dysphagia, no nausea or no sore throat. This is a chronic problem. The problem has been resolved. The heartburn is of mild intensity. The symptoms are aggravated by certain foods. Pertinent negatives include no fatigue. He has tried head elevation for the symptoms. The treatment provided no relief. Past Medical History:   Diagnosis Date    Ankle fracture 2/2016    left in cast    CAD (coronary artery disease)     margarette    Cancer ([facility] [address]     right cheek-basal cell,neck,bladder    Cervical vertebra fusion     Colon polyps 2010    abrahan    Hyperlipemia     Melanoma ([facility] [address]     Neck, Scalp    Parkinson disease ([facility] [address] 2016    Sleep apnea     cpap    Spinal stenosis     Subdural hematoma ([facility] [address] 2015       resolved     Syncope      Review of Systems   Constitutional: Negative for fatigue and fever. HENT: Negative for congestion, ear pain, postnasal drip, sore throat and trouble swallowing. Eyes: Negative for pain. Respiratory: Negative for cough, chest tightness and shortness of breath. Cardiovascular: Negative for chest pain, palpitations and leg swelling. Gastrointestinal: Positive for heartburn. Negative for abdominal pain, blood in stool, constipation, dysphagia and nausea. Genitourinary: Negative for difficulty urinating, frequency and urgency. Musculoskeletal: Negative for arthralgias, back pain, joint swelling and neck stiffness. Skin: Negative for rash. Neurological: Negative for dizziness, weakness and headaches. Hematological: Negative for adenopathy. Does not bruise/bleed easily. Psychiatric/Behavioral: Negative for behavioral problems, dysphoric mood and sleep disturbance.      /72 (Site: Right Arm, Position: Sitting, Cuff Size: DAILY 270 tablet 3    aspirin 81 MG EC tablet Take 1 tablet by mouth daily 30 tablet 3    amLODIPine (NORVASC) 2.5 MG tablet Take 1 tablet by mouth daily 30 tablet 0    Coenzyme Q10 (COQ10) 400 MG CAPS Take 1 capsule by mouth daily      Multiple Vitamins-Minerals (PRESERVISION/LUTEIN) CAPS Take 1 capsule by mouth 2 times daily      cetirizine (ZYRTEC) 10 MG tablet Take 10 mg by mouth daily.  fish oil-omega-3 fatty acids 1000 MG capsule Take 1 g by mouth daily       Multiple Vitamin (MULTIVITAMIN PO)   Take 1 tablet by mouth daily        No current facility-administered medications for this visit. No orders of the defined types were placed in this encounter.   No results found for this visit on 02/06/18.       add  In prilosec  For  gerd  At   9 pm and   See in one month

## 2018-02-19 ENCOUNTER — TELEPHONE (OUTPATIENT)
Dept: NEUROLOGY | Age: 83
End: 2018-02-19

## 2018-02-19 NOTE — TELEPHONE ENCOUNTER
Patient's wife called, she feels the patient's parkinson's medications need to be adjusted because he is not doing as well on them as he had in the past. Wife reports Dr Nasario Cockayne does not want him to be increased anymore with the sinemet 25/100mg TID due to cardiac issues. She is wondering if there is anything else he can try. Please advise.

## 2018-02-20 RX ORDER — ROPINIROLE 0.25 MG/1
0.25 TABLET, FILM COATED ORAL 3 TIMES DAILY
Qty: 90 TABLET | Refills: 2 | Status: SHIPPED | OUTPATIENT
Start: 2018-02-20 | End: 2018-03-28 | Stop reason: SDUPTHER

## 2018-03-02 ENCOUNTER — OFFICE VISIT (OUTPATIENT)
Dept: FAMILY MEDICINE CLINIC | Age: 83
End: 2018-03-02

## 2018-03-02 VITALS
BODY MASS INDEX: 23.07 KG/M2 | DIASTOLIC BLOOD PRESSURE: 70 MMHG | HEART RATE: 52 BPM | WEIGHT: 161.13 LBS | HEIGHT: 70 IN | SYSTOLIC BLOOD PRESSURE: 130 MMHG | RESPIRATION RATE: 14 BRPM

## 2018-03-02 DIAGNOSIS — E78.00 PURE HYPERCHOLESTEROLEMIA: ICD-10-CM

## 2018-03-02 DIAGNOSIS — H61.92 SKIN LESION OF LEFT EAR: ICD-10-CM

## 2018-03-02 DIAGNOSIS — S06.5XAA SUBDURAL HEMATOMA: ICD-10-CM

## 2018-03-02 DIAGNOSIS — K21.9 GASTROESOPHAGEAL REFLUX DISEASE WITHOUT ESOPHAGITIS: Primary | ICD-10-CM

## 2018-03-02 DIAGNOSIS — Z85.820 HISTORY OF MALIGNANT MELANOMA OF SKIN: ICD-10-CM

## 2018-03-02 DIAGNOSIS — G20 PARKINSON DISEASE (HCC): ICD-10-CM

## 2018-03-02 PROCEDURE — 99213 OFFICE O/P EST LOW 20 MIN: CPT | Performed by: FAMILY MEDICINE

## 2018-03-02 RX ORDER — OMEPRAZOLE 20 MG/1
20 CAPSULE, DELAYED RELEASE ORAL DAILY
Qty: 30 CAPSULE | Refills: 1 | Status: SHIPPED | OUTPATIENT
Start: 2018-03-02 | End: 2018-01-01 | Stop reason: SDUPTHER

## 2018-03-02 RX ORDER — ATORVASTATIN CALCIUM 10 MG/1
TABLET, FILM COATED ORAL
Qty: 90 TABLET | Refills: 1 | Status: SHIPPED | OUTPATIENT
Start: 2018-03-02 | End: 2018-01-01 | Stop reason: SDUPTHER

## 2018-03-02 ASSESSMENT — ENCOUNTER SYMPTOMS
BLOOD IN STOOL: 0
CHEST TIGHTNESS: 0
CONSTIPATION: 0
BACK PAIN: 0
HEARTBURN: 1
COUGH: 0
SHORTNESS OF BREATH: 0
SORE THROAT: 0
ABDOMINAL PAIN: 0
BELCHING: 0
TROUBLE SWALLOWING: 0
NAUSEA: 0
EYE PAIN: 0

## 2018-03-02 NOTE — PROGRESS NOTES
Subjective:      Patient ID: Isadora Levin is a 80 y.o. male. parkinsons  Stable       Gastroesophageal Reflux   He complains of heartburn (better). He reports no abdominal pain, no belching, no chest pain, no coughing, no nausea or no sore throat. This is a new problem. The current episode started more than 1 month ago. The heartburn is of mild intensity. The heartburn wakes him from sleep. The symptoms are aggravated by certain foods. Pertinent negatives include no fatigue. He has tried a PPI for the symptoms. The treatment provided significant relief. Hx  Of  Fall  With  Bleed        Lesion    Right  ear   Review of Systems   Constitutional: Negative for fatigue and fever. HENT: Negative for congestion, ear pain, postnasal drip, sore throat and trouble swallowing. Eyes: Negative for pain. Respiratory: Negative for cough, chest tightness and shortness of breath. Cardiovascular: Negative for chest pain, palpitations and leg swelling. Gastrointestinal: Positive for heartburn (better). Negative for abdominal pain, blood in stool, constipation and nausea. Genitourinary: Negative for difficulty urinating, frequency and urgency. Musculoskeletal: Negative for arthralgias, back pain, joint swelling and neck stiffness. Skin: Negative for rash. Neurological: Negative for dizziness, weakness and headaches. Hematological: Negative for adenopathy. Does not bruise/bleed easily. Psychiatric/Behavioral: Negative for behavioral problems, dysphoric mood and sleep disturbance. /70 (Site: Right Arm, Position: Sitting, Cuff Size: Medium Adult)   Pulse 52   Resp 14   Ht 5' 10\" (1.778 m)   Wt 161 lb 2 oz (73.1 kg)   BMI 23.12 kg/m²   Objective:   Physical Exam   Constitutional: He is oriented to person, place, and time. He appears well-developed and well-nourished. HENT:   Head: Normocephalic and atraumatic.    Right Ear: External ear normal.   Left Ear: External ear normal.   Nose: Nose 0.4 MG capsule TAKE 1 CAPSULE BY MOUTH EVERY NIGHT 90 capsule 3    finasteride (PROSCAR) 5 MG tablet TAKE 1 TABLET BY MOUTH DAILY 90 tablet 0    carbidopa-levodopa (SINEMET)  MG per tablet TAKE 1 TABLET BY MOUTH THREE TIMES DAILY 270 tablet 3    aspirin 81 MG EC tablet Take 1 tablet by mouth daily 30 tablet 3    amLODIPine (NORVASC) 2.5 MG tablet Take 1 tablet by mouth daily 30 tablet 0    Coenzyme Q10 (COQ10) 400 MG CAPS Take 1 capsule by mouth daily      Multiple Vitamins-Minerals (PRESERVISION/LUTEIN) CAPS Take 1 capsule by mouth 2 times daily      cetirizine (ZYRTEC) 10 MG tablet Take 10 mg by mouth daily.  fish oil-omega-3 fatty acids 1000 MG capsule Take 1 g by mouth daily       Multiple Vitamin (MULTIVITAMIN PO)   Take 1 tablet by mouth daily        No current facility-administered medications for this visit. Orders Placed This Encounter   Procedures    [facility]- Zora Plascencia MD     Referral Priority:   Routine     Referral Type:   Consult for Advice and Opinion     Referral Reason:   Specialty Services Required     Referred to Provider:   Sallie Montano MD     Requested Specialty:   Plastic Surgery     Number of Visits Requested:   1   No results found for this visit on 03/02/18. Take Prilosec another 2 months then stop see back in about 2-1/2 months.  For the ear to Dr. Mark Chahal

## 2018-03-20 ENCOUNTER — TELEPHONE (OUTPATIENT)
Dept: NEUROLOGY | Age: 83
End: 2018-03-20

## 2018-03-28 ENCOUNTER — TELEPHONE (OUTPATIENT)
Dept: NEUROLOGY | Age: 83
End: 2018-03-28

## 2018-03-28 RX ORDER — ROPINIROLE 0.25 MG/1
1 TABLET, FILM COATED ORAL 3 TIMES DAILY
Qty: 90 TABLET | Refills: 2 | Status: ON HOLD | OUTPATIENT
Start: 2018-03-28 | End: 2018-01-01

## 2018-04-04 ENCOUNTER — HOSPITAL ENCOUNTER (EMERGENCY)
Age: 83
Discharge: HOME | End: 2018-04-04
Attending: INTERNAL MEDICINE
Payer: MEDICARE

## 2018-04-04 ENCOUNTER — APPOINTMENT (OUTPATIENT)
Dept: CT IMAGING | Age: 83
End: 2018-04-04
Payer: MEDICARE

## 2018-04-04 ENCOUNTER — TELEPHONE (OUTPATIENT)
Dept: FAMILY MEDICINE CLINIC | Age: 83
End: 2018-04-04

## 2018-04-04 ENCOUNTER — TELEPHONE (OUTPATIENT)
Dept: NEUROLOGY | Age: 83
End: 2018-04-04

## 2018-04-04 VITALS
DIASTOLIC BLOOD PRESSURE: 75 MMHG | RESPIRATION RATE: 18 BRPM | TEMPERATURE: 97.9 F | BODY MASS INDEX: 22.05 KG/M2 | HEIGHT: 70 IN | HEART RATE: 79 BPM | OXYGEN SATURATION: 94 % | WEIGHT: 154 LBS | SYSTOLIC BLOOD PRESSURE: 131 MMHG
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
## 2018-04-04 DIAGNOSIS — S09.90XA CLOSED HEAD INJURY, INITIAL ENCOUNTER: Primary | ICD-10-CM

## 2018-04-04 LAB
ANION GAP SERPL CALCULATED.3IONS-SCNC: 12 MEQ/L (ref 8–16)
BASOPHILS # BLD: 0.3 %
BASOPHILS ABSOLUTE: 0 THOU/MM3 (ref 0–0.1)
BILIRUBIN URINE: NEGATIVE
BLOOD, URINE: NEGATIVE
BUN BLDV-MCNC: 14 MG/DL (ref 7–22)
CALCIUM SERPL-MCNC: 9.4 MG/DL (ref 8.5–10.5)
CHARACTER, URINE: CLEAR
CHLORIDE BLD-SCNC: 104 MEQ/L (ref 98–111)
CO2: 27 MEQ/L (ref 23–33)
COLOR: YELLOW
CREAT SERPL-MCNC: 0.7 MG/DL (ref 0.4–1.2)
EKG ATRIAL RATE: 60 BPM
EKG P AXIS: 69 DEGREES
EKG P-R INTERVAL: 184 MS
EKG Q-T INTERVAL: 476 MS
EKG QRS DURATION: 142 MS
EKG QTC CALCULATION (BAZETT): 476 MS
EKG R AXIS: -59 DEGREES
EKG T AXIS: 38 DEGREES
EKG VENTRICULAR RATE: 60 BPM
EOSINOPHIL # BLD: 1.5 %
EOSINOPHILS ABSOLUTE: 0.1 THOU/MM3 (ref 0–0.4)
GFR SERPL CREATININE-BSD FRML MDRD: > 90 ML/MIN/1.73M2
GLUCOSE BLD-MCNC: 106 MG/DL (ref 70–108)
GLUCOSE URINE: NEGATIVE MG/DL
HCT VFR BLD CALC: 40.3 % (ref 42–52)
HEMOGLOBIN: 13.6 GM/DL (ref 14–18)
INR BLD: 1.11 (ref 0.85–1.13)
KETONES, URINE: NEGATIVE
LEUKOCYTE ESTERASE, URINE: NEGATIVE
LYMPHOCYTES # BLD: 16.9 %
LYMPHOCYTES ABSOLUTE: 1.1 THOU/MM3 (ref 1–4.8)
MCH RBC QN AUTO: 31.2 PG (ref 27–31)
MCHC RBC AUTO-ENTMCNC: 33.7 GM/DL (ref 33–37)
MCV RBC AUTO: 92.4 FL (ref 80–94)
MONOCYTES # BLD: 9.3 %
MONOCYTES ABSOLUTE: 0.6 THOU/MM3 (ref 0.4–1.3)
NITRITE, URINE: NEGATIVE
NUCLEATED RED BLOOD CELLS: 0 /100 WBC
OSMOLALITY CALCULATION: 285.9 MOSMOL/KG (ref 275–300)
PDW BLD-RTO: 14.5 % (ref 11.5–14.5)
PH UA: 6.5
PLATELET # BLD: 150 THOU/MM3 (ref 130–400)
PMV BLD AUTO: 8.3 FL (ref 7.4–10.4)
POTASSIUM SERPL-SCNC: 3.9 MEQ/L (ref 3.5–5.2)
PROTEIN UA: NEGATIVE
RBC # BLD: 4.36 MILL/MM3 (ref 4.7–6.1)
SEG NEUTROPHILS: 72 %
SEGMENTED NEUTROPHILS ABSOLUTE COUNT: 4.9 THOU/MM3 (ref 1.8–7.7)
SODIUM BLD-SCNC: 143 MEQ/L (ref 135–145)
SPECIFIC GRAVITY, URINE: 1.01 (ref 1–1.03)
UROBILINOGEN, URINE: 0.2 EU/DL
WBC # BLD: 6.8 THOU/MM3 (ref 4.8–10.8)

## 2018-04-04 PROCEDURE — 93005 ELECTROCARDIOGRAM TRACING: CPT | Performed by: INTERNAL MEDICINE

## 2018-04-04 PROCEDURE — 99284 EMERGENCY DEPT VISIT MOD MDM: CPT

## 2018-04-04 PROCEDURE — 72125 CT NECK SPINE W/O DYE: CPT

## 2018-04-04 PROCEDURE — 70450 CT HEAD/BRAIN W/O DYE: CPT

## 2018-04-04 PROCEDURE — 85025 COMPLETE CBC W/AUTO DIFF WBC: CPT

## 2018-04-04 PROCEDURE — 36415 COLL VENOUS BLD VENIPUNCTURE: CPT

## 2018-04-04 PROCEDURE — 85610 PROTHROMBIN TIME: CPT

## 2018-04-04 PROCEDURE — 80048 BASIC METABOLIC PNL TOTAL CA: CPT

## 2018-04-04 PROCEDURE — 81003 URINALYSIS AUTO W/O SCOPE: CPT

## 2018-04-04 ASSESSMENT — ENCOUNTER SYMPTOMS
EYE DISCHARGE: 0
SHORTNESS OF BREATH: 0
SORE THROAT: 0
NAUSEA: 0
EYE REDNESS: 0
ABDOMINAL PAIN: 0
VOMITING: 0
COUGH: 0
BACK PAIN: 0
WHEEZING: 0
DIARRHEA: 0
RHINORRHEA: 0

## 2018-04-05 PROCEDURE — 93010 ELECTROCARDIOGRAM REPORT: CPT | Performed by: INTERNAL MEDICINE

## 2018-04-06 ENCOUNTER — OFFICE VISIT (OUTPATIENT)
Dept: FAMILY MEDICINE CLINIC | Age: 83
End: 2018-04-06

## 2018-04-06 VITALS
BODY MASS INDEX: 22.82 KG/M2 | WEIGHT: 159.38 LBS | DIASTOLIC BLOOD PRESSURE: 66 MMHG | HEART RATE: 52 BPM | SYSTOLIC BLOOD PRESSURE: 108 MMHG | RESPIRATION RATE: 14 BRPM | HEIGHT: 70 IN

## 2018-04-06 DIAGNOSIS — M48.061 SPINAL STENOSIS OF LUMBAR REGION, UNSPECIFIED WHETHER NEUROGENIC CLAUDICATION PRESENT: ICD-10-CM

## 2018-04-06 DIAGNOSIS — M21.372 FOOT DROP, LEFT: ICD-10-CM

## 2018-04-06 DIAGNOSIS — G20 PARKINSON DISEASE (HCC): ICD-10-CM
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
## 2018-04-06 PROCEDURE — 99213 OFFICE O/P EST LOW 20 MIN: CPT | Performed by: FAMILY MEDICINE

## 2018-04-06 ASSESSMENT — ENCOUNTER SYMPTOMS
NAUSEA: 0
BACK PAIN: 0
CHEST TIGHTNESS: 0
CONSTIPATION: 0
SORE THROAT: 0
ABDOMINAL PAIN: 0
COUGH: 0
BLOOD IN STOOL: 0
EYE PAIN: 0
TROUBLE SWALLOWING: 0
SHORTNESS OF BREATH: 0

## 2018-07-06 NOTE — PROGRESS NOTES
medications for this visit. Macular rash. This is not hives do not feel from the thigh more contact dermatitis.  Taper prednisone and Atarax

## 2018-07-13 NOTE — TELEPHONE ENCOUNTER
[facility] came into the office and concerned about patient. He is having surgery on 8-3-2018 with Dr Marie Araujo and 2 out of his 3 children have been Diagnosed with Factor V Leiden. She wanted to know if you can order the blood test for him before his surgery? He is also going to get another cardiac cath for stent placement after bladder surgery.

## 2018-07-27 NOTE — H&P
History and Physical performed by Dr. Emanuel Delgadillo    Mr. Leandra Mathew was seen in follow up for surveillance cystoscopy.     This was completed today without difficulty     Past Medical History        Past Medical History:   Diagnosis Date    Ankle fracture 2/2016     left in cast    CAD (coronary artery disease)       margarette    Cancer [facility])       right cheek-basal cell,neck,bladder    Cervical vertebra fusion      Colon polyps 2010     abrahan    Hyperlipemia      Melanoma ([address]       Neck, Scalp    Parkinson disease ([address] 2016    Sleep apnea       cpap    Spinal stenosis      Subdural hematoma ([facility] [address] 2015        resolved     Syncope              Past Surgical History         Past Surgical History:   Procedure Laterality Date    BLADDER SURGERY   5/31/11    BLADDER TUMOR EXCISION   2011    BLEPHAROPLASTY   2007, 2008    CARDIAC CATHETERIZATION   2009     moderate    margarette    CATARACT REMOVAL Bilateral 1995    CERVICAL FUSION   2009    COLONOSCOPY   9-24-10       abrahan  wn    CYSTOSCOPY   .6/03/2015    HEMORRHOID SURGERY   1994    SKIN BIOPSY         left river[place] - Dr Wendy Bynum SKIN CANCER EXCISION         neck and scalp melanoma, cheek basal cell    SKIN CANCER EXCISION         left [place] - Dr Wendy Bynum TONSILLECTOMY                       Current Outpatient Prescriptions on File Prior to Visit   Medication Sig Dispense Refill    carbidopa-levodopa (SINEMET)  MG per tablet TAKE 1 TABLET BY MOUTH THREE TIMES DAILY 270 tablet 3    amLODIPine (NORVASC) 2.5 MG tablet Take 1 tablet by mouth daily 90 tablet 1    solifenacin (VESICARE) 5 MG tablet TAKE 1 TABLET NIGHTLY 90 tablet 1    finasteride (PROSCAR) 5 MG tablet TAKE 1 TABLET BY MOUTH ONCE DAILY 90 tablet 0    omeprazole (PRILOSEC) 20 MG delayed release capsule Take 1 capsule by mouth Daily 30 capsule 5    rOPINIRole (REQUIP) 0.25 MG tablet Take 4 tablets by mouth 3 times daily (Patient taking differently: Take 0.5 mg by mouth 3 times daily ) 90 tablet 2    atorvastatin (LIPITOR) 10 MG tablet TAKE 1 TABLET BY MOUTH DAILY 90 tablet 1    tamsulosin (FLOMAX) 0.4 MG capsule TAKE 1 CAPSULE BY MOUTH EVERY NIGHT 90 capsule 3    aspirin 81 MG EC tablet Take 1 tablet by mouth daily 30 tablet 3    Coenzyme Q10 (COQ10) 400 MG CAPS Take 1 capsule by mouth daily        Multiple Vitamins-Minerals (PRESERVISION/LUTEIN) CAPS Take 1 capsule by mouth 2 times daily        cetirizine (ZYRTEC) 10 MG tablet Take 10 mg by mouth daily.          fish oil-omega-3 fatty acids 1000 MG capsule Take 1 g by mouth daily         Multiple Vitamin (MULTIVITAMIN PO)    Take 1 tablet by mouth daily         rotigotine (NEUPRO) 2 MG/24HR Place 1 patch onto the skin daily 90 patch 0      No current facility-administered medications on file prior to visit.                Allergies   Allergen Reactions    Sulfa Antibiotics         Unknown reaction from childhood         Family History          Family History   Problem Relation Age of Onset    Heart Disease Mother      Heart Surgery Mother      Heart Attack Father      Heart Disease Father 37       MI    Stroke Maternal Grandfather      Prostate Cancer Neg Hx      Cancer Neg Hx      Diabetes Neg Hx      High Blood Pressure Neg Hx              Social History   Social History            Social History    Marital status:        Spouse name: N/A    Number of children: N/A    Years of education: N/A          Occupational History    Not on file.             Social History Main Topics    Smoking status: Former Smoker       Quit date: 9/19/1978    Smokeless tobacco: Never Used    Alcohol use No         Comment: rare     Drug use: No    Sexual activity: Not on file           Other Topics Concern    Not on file          Social History Narrative    No narrative on file            Review of Systems  No problems with ears, nose or throat. No problems with eyes.   No chest pain, shortness of breath, abdominal pain, extremity pain or weakness, and no neurological deficits. No rashes. No swollen glands or lymph nodes.  symptoms per HPI. The remainder of the review of symptoms is negative.     Exam  General: alert and oriented. Cooperative. HENT: Normocephalic, Atraumatic. Eyes: No scleral icterus, mucous membranes moist.  Respiratory: Effort normal.   Skin: No rashes or obvious lesions.     Labs           Results for POC orders placed in visit on 06/18/18   POCT Urinalysis No Micro (Auto)   Result Value Ref Range     Glucose, Ur Negative NEGATIVE mg/dl     Bilirubin Urine Negative       Ketones, Urine Negative NEGATIVE     Specific Gravity, Urine 1.010 1.002 - 1.03     Blood, UA POC Trace-intact NEGATIVE     pH, Urine 7.00 5.0 - 9.0     Protein, Urine Negative NEGATIVE mg/dl     Urobilinogen, Urine 0.20 0.0 - 1.0 eu/dl     Nitrite, Urine Negative NEGATIVE     Leukocyte Clumps, Urine Trace (A) NEGATIVE     Color, Urine Yellow YELLOW-STR     Character, Urine Clear [id number].BJ               Lab Results   Component Value Date     CREATININE 0.7 04/04/2018     BUN 14 04/04/2018      04/04/2018     K 3.9 04/04/2018      04/04/2018     CO2 27 04/04/2018            Cystoscopy  After obtaining informed consent and prepping the urethral meatus, a 16-Uzbek flexible cystoscope was passed per urethra into the bladder. The urethra was evaluated on the way in and then again on the way out and was found to be normal.  The prostate was moderately obstructing. The bladder was evaluated in its endoscopic entirety and found to have a tumor at the previous resection site. There were no stones, ulcers or foreign bodies. There were no mucosal abnormalities. The ureteral orifices were seen and were normal.  The scope was removed. The patient tolerated the procedure and there were no complications.  A dose of 500 mg ciprofloxacin was given for the procedure.       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 Will need TURBT.     I described the procedure in detail and also described the associated risks and benefits at length. We discussed possible alternative therapies. We discussed the risks and benefits of not undergoing therapy. Monse Garcia understands these risks and benefits and desires to proceed. He will be scheduled for the procedure in the very near future.

## 2018-07-27 NOTE — PROGRESS NOTES
PAT call attempted patient unavailable left message with instructions    NPO after midnight  Bring insurance info and drivers license  Wear comfortable clean clothing  Do not bring jewelry   Shower night before and morning of surgery with a liquid antibacterial soap  Bring medications in original bottles  Follow all instructions give by your physician   needed at discharge  Call [phone] for any questions

## 2018-08-03 NOTE — ANESTHESIA POSTPROCEDURE EVALUATION
(36.4 °C) Temporal 55 16 94 % 154 lb (69.9 kg)       Level of Consciousness:  Awake    Respiratory:  Stable    Oxygen Saturation:  Stable    Cardiovascular:  Stable    Hydration:  Adequate    PONV:  Stable    Post-op Pain:  Adequate analgesia    Post-op Assessment:  No apparent anesthetic complications    Additional Follow-Up / Treatment / Comment:  None    Mars Johnson MD  August 3, 2018   9:43 AM

## 2018-08-03 NOTE — BRIEF OP NOTE
Brief Postoperative Note    Ondina Hicks  YOB: 1931  [id number]    Pre-operative Diagnosis: bladder tumor / lesion    Post-operative Diagnosis: same    Procedure: TURBT medium    Anesthesia: general    Surgeons/Assistants: Yanick James    Estimated Blood Loss: 5 cc    Complications: none    Specimens: none    Findings: tumor at edge of previous resection scar on right posterior / lateral wall, easily scraped cold and base fulgurated    Electronically signed by Jose Aquino MD on 8/3/2018 at 8:10 AM

## 2018-08-03 NOTE — PROGRESS NOTES
0813  Pt. Responds slightly to name on adm. To pacu. 0817  Pt. Awake and oriented. Pt. Denies pain. o2 discontinued. 0830  Pt. Resting quietly. 0840  Pt. Continues to deny pain.

## 2018-08-03 NOTE — OP NOTE
located on the posterior lateral bladder wall along the edge of a previous resection scar. The resection and fulguration bed was 3 x 3 cm. The patient was then transferred from the table to the stretcher and then to the PACU in stable condition. Otoniel tolerated the procedure well and there were no complications.

## 2018-08-03 NOTE — INTERVAL H&P NOTE
[facility]  History and Physical Update    Pt Name: Bert Crandall  MRN: [id number]  YOB: 1931  Date of evaluation: 8/3/2018    [] I have examined the patient and reviewed the H&P/Consult and there are no changes to the patient or plans. [x] I have examined the patient and reviewed the H&P/Consult and have noted the following changes: No changes per patient.         Siri Guido MD  Electronically signed 8/3/2018 at 8:09 AM

## 2018-08-03 NOTE — ANESTHESIA PRE PROCEDURE
margarette    CATARACT REMOVAL Bilateral 1995    CERVICAL FUSION  2009    COLONOSCOPY  9-24-10      abrahan  wnl    CYSTOSCOPY  .6/03/2015    EYE SURGERY      HEMORRHOID SURGERY  1994    SKIN BIOPSY      left [place] - Dr Ade Peña      neck and scalp melanoma, cheek basal cell    SKIN CANCER EXCISION      left [place] - Dr Abida Anderson TONSILLECTOMY         Social History:    Social History   Substance Use Topics    Smoking status: Former Smoker     Quit date: 9/19/1978    Smokeless tobacco: Never Used    Alcohol use No      Comment: rare                                 Counseling given: Not Answered      Vital Signs (Current):   Vitals:    08/03/18 0621   BP: (!) 165/77   Pulse: 55   Resp: 16   Temp: 97.6 °F (36.4 °C)   TempSrc: Temporal   SpO2: 94%   Weight: 154 lb (69.9 kg)                                              BP Readings from Last 3 Encounters:   08/03/18 (!) 165/77   07/06/18 110/66   07/03/18 (!) 108/54       NPO Status: Time of last liquid consumption: 2000                        Time of last solid consumption: 2000                        Date of last liquid consumption: 08/02/18                        Date of last solid food consumption: 08/02/18    BMI:   Wt Readings from Last 3 Encounters:   08/03/18 154 lb (69.9 kg)   07/06/18 158 lb 8 oz (71.9 kg)   07/03/18 154 lb (69.9 kg)     Body mass index is 23.42 kg/m².     CBC:   Lab Results   Component Value Date    WBC 7.8 07/03/2018    RBC 4.50 07/03/2018    RBC 4.51 02/16/2016    HGB 14.3 07/03/2018    HCT 43.6 07/03/2018    MCV 96.9 07/03/2018    RDW 14.5 04/04/2018     07/03/2018       CMP:   Lab Results   Component Value Date     07/03/2018    K 3.8 07/03/2018     07/03/2018    CO2 26 07/03/2018    BUN 21 07/03/2018    CREATININE 0.8 07/03/2018    LABGLOM >90 07/03/2018    GLUCOSE 100 07/03/2018    GLUCOSE 113 02/16/2016    PROT 7.2 07/03/2018    CALCIUM 9.3 07/03/2018    BILITOT 0.7 07/03/2018    ALKPHOS 92 07/03/2018    AST 21 07/03/2018    ALT 14 07/03/2018       POC Tests: No results for input(s): POCGLU, POCNA, POCK, POCCL, POCBUN, POCHEMO, POCHCT in the last 72 hours. Coags:   Lab Results   Component Value Date    INR 1.08 07/03/2018       HCG (If Applicable): No results found for: PREGTESTUR, PREGSERUM, HCG, HCGQUANT     ABGs: No results found for: PHART, PO2ART, FFC3BID, EUA7IYV, BEART, C0SGNJHG     Type & Screen (If Applicable):  Lab Results   Component Value Date    [facility] 07/03/2018       Anesthesia Evaluation  Patient summary reviewed and Nursing notes reviewed  Airway: Mallampati: II  TM distance: >3 FB   Neck ROM: full  Mouth opening: > = 3 FB Dental: normal exam         Pulmonary:normal exam  breath sounds clear to auscultation  (+) sleep apnea:                             Cardiovascular:  Exercise tolerance: good (>4 METS),   (+) CAD: obstructive, hyperlipidemia        Rhythm: regular  Rate: normal                    Neuro/Psych:   (+) neuromuscular disease: Parkinson's disease,             GI/Hepatic/Renal:   (+) GERD:, renal disease: CRI,           Endo/Other:    (+) malignancy/cancer. Abdominal:           Vascular:                                        Anesthesia Plan      general     ASA 3       Induction: intravenous. MIPS: Postoperative opioids intended and Prophylactic antiemetics administered. Anesthetic plan and risks discussed with patient and spouse. Plan discussed with CRNA.                   Alexia Camarena MD   8/3/2018

## 2018-08-14 NOTE — PROGRESS NOTES
tablet 0    aspirin 81 MG EC tablet Take 1 tablet by mouth daily 30 tablet 3    rOPINIRole (REQUIP) 0.5 MG tablet Take 0.5 mg by mouth 3 times daily      finasteride (PROSCAR) 5 MG tablet TAKE 1 TABLET BY MOUTH ONCE DAILY 90 tablet 0    tamsulosin (FLOMAX) 0.4 MG capsule TAKE 1 CAPSULE BY MOUTH EVERY NIGHT 90 capsule 3    carbidopa-levodopa (SINEMET)  MG per tablet TAKE 1 TABLET BY MOUTH THREE TIMES DAILY 270 tablet 3    amLODIPine (NORVASC) 2.5 MG tablet Take 1 tablet by mouth daily 90 tablet 1    solifenacin (VESICARE) 5 MG tablet TAKE 1 TABLET NIGHTLY 90 tablet 1    Coenzyme Q10 (COQ10) 400 MG CAPS Take 1 capsule by mouth daily      Multiple Vitamins-Minerals (PRESERVISION/LUTEIN) CAPS Take 1 capsule by mouth 2 times daily      cetirizine (ZYRTEC) 10 MG tablet Take 10 mg by mouth daily.  fish oil-omega-3 fatty acids 1000 MG capsule Take 1 g by mouth daily       Multiple Vitamin (MULTIVITAMIN PO)   Take 1 tablet by mouth daily        No current facility-administered medications for this visit. No orders of the defined types were placed in this encounter.          see in  [address] [place] [address] Adan Bowen MD

## 2018-08-17 NOTE — PROGRESS NOTES
[address] [place] [address].  [facility]  [address]  Dept: [phone]  Dept Fax: [phone]  Loc: [phone]    Alexander Buchanan is a 80 y.o. male was seen in the office for follow up of bladder tumor in which he underwent a TURBT with fulguration with Dr. Valente Mcburney on 8/3/2018. Post-operatively he complained of urgency, frequency, and nocturia  which started on 8/5 but states has been improving since that time. Urine culture ordered 8/7/2018 showed mixed growth of contaminants which could not exclude UTI. He was prescribed Ciprofloxacin for 3 days at discharge. He believes his symptoms improved after completing the ATB therapy. Urinalysis today was negative. He is currently taking Vesicare, Proscar, and Flomax daily. He has a significant history of neurogenic bladder and BPH. He denies dysuria, hesitancy, weak stream, , frequency, gross hematuria, flank pain, fever, chills, suprapubic pain, and feeling of incomplete emptying. Susan Hernandez also denies night sweats, poor appetite, unexplained weight loss, fatigue, malaise. History was gathered directly from patient, family, and the medical record.     Past Medical History:   Diagnosis Date    Ankle fracture 2/2016    left in cast    Arthritis     CAD (coronary artery disease)     margarette    Cancer ([address]     right cheek-basal cell,neck,bladder    Cervical vertebra fusion     Chronic kidney disease     Colon polyps 2010    abrahan    GERD (gastroesophageal reflux disease)     Hyperlipemia     Melanoma ([address]     Neck, Scalp    Other disorders of kidney and ureter in diseases classified elsewhere     Parkinson disease ([address] 2016    Parkinson disease ([address]     Sleep apnea     cpap    Spinal stenosis     Subdural hematoma ([address] 2015       resolved     Syncope        Past Surgical History:   Procedure Laterality Date    BACK SURGERY      BLADDER SURGERY  5/31/11    BLADDER TUMOR by mouth daily       Multiple Vitamin (MULTIVITAMIN PO)   Take 1 tablet by mouth daily        No current facility-administered medications on file prior to visit. Allergies   Allergen Reactions    Sulfa Antibiotics      Unknown reaction from childhood       Social History   Substance Use Topics    Smoking status: Former Smoker     Quit date: 9/19/1978    Smokeless tobacco: Never Used    Alcohol use No      Comment: rare        Family History   Problem Relation Age of Onset    Heart Disease Mother     Heart Surgery Mother     Heart Attack Father     Heart Disease Father 37        MI    Stroke Maternal Grandfather     Prostate Cancer Neg Hx     Cancer Neg Hx     Diabetes Neg Hx     High Blood Pressure Neg Hx        Review of Systems  ROS  No problems with ears, nose or throat. No problems with eyes. No chest pain, shortness of breath, abdominal pain, extremity pain or weakness, and no neurological deficits. No rashes. No swollen glands or lymph nodes.  symptoms per HPI. The remainder of the review of symptoms is negative. Exam  Vitals:    08/17/18 1223   BP: 120/60   Weight: 155 lb (70.3 kg)   Height: 5' 10\" (1.778 m)     Nursing note and vitals reviewed. Constitutional: Alert and oriented times 3, no acute distress and cooperative to examination with appropriate mood and affect. HENT:   Head:        Normocephalic and atraumatic. Mouth/Throat:         Mucous membranes are normal.   Eyes:         EOM are normal. No scleral icterus. PERRLA. Neck:        Supple, symmetrical, trachea midline, no adenopathy, thyroid symmetric, not enlarged and no tenderness. Cardiovascular:        Normal rate, regular rhythm, S1 S2 heart sounds. No murmurs, rub, or gallops. Pulses:       Radial pulses are 2+/4 bilateral and equal. Posterior tibialis 2+/4 bilateral and equal  Pulmonary/Chest:      Chest symmetric with normal A/P diameter,  CTA with no wheezes, rales, or rhonchi noted.  Normal respiratory rate and rhthym. No use of accessory muscles. Abdominal:         Soft. No tenderness. No rebound, no guarding and no CVA tenderness. Bowel sounds present. Musculoskeletal:         Normal range of motion. No edema or tenderness of lower extremities. Lymphadenopathy:        No cervical adenopathy. Bilateral supraclavicular adenopathy absent. Extremities: No cyanosis, clubbing, or edema present. Neurological:        Alert and oriented. No cranial nerve deficit. There are no focalizing motor or sensory deficits. CN II-XII are grossly intact. Psychiatric:        Normal mood and affect. Labs   Urine dip demonstrates   Results for POC orders placed in visit on 08/17/18   POCT Urinalysis No Micro (Auto)   Result Value Ref Range    Glucose, Ur Negative NEGATIVE mg/dl    Bilirubin Urine Negative     Ketones, Urine Negative NEGATIVE    Specific Gravity, Urine <= 1.005 1.002 - 1.03    Blood, UA POC Trace-intact NEGATIVE    pH, Urine 5.50 5.0 - 9.0    Protein, Urine Negative NEGATIVE mg/dl    Urobilinogen, Urine 0.20 0.0 - 1.0 eu/dl    Nitrite, Urine Negative NEGATIVE    Leukocyte Clumps, Urine Small (A) NEGATIVE    Color, Urine Yellow YELLOW-STR    Character, Urine Clear [id number]     Radiology  All pertinent labs and images resulted have been reviewed personally by me. Assessment & Plan     Diagnosis Orders   1. Malignant neoplasm of overlapping sites of bladder (HCC)  POCT Urinalysis No Micro (Auto)   2. Bladder tumor     3. Benign prostatic hyperplasia without lower urinary tract symptoms       Follow-up in 3 months for cystoscopy. Will plan to get CX bladder testing at this time.

## 2018-09-17 NOTE — PROGRESS NOTES
Immunizations     Name Date Dose Route    Influenza, MDCK Quadv, with preserv IM (Flucelvax 4 yrs and older) 9/17/2018 0.5 mL Intramuscular    Site: Deltoid- Right    Lot: [id number]    NDC: 12419-244-14

## 2018-10-09 NOTE — TELEPHONE ENCOUNTER
Clementina Mccarthy called requesting a refill on the following medications:  Requested Prescriptions     Pending Prescriptions Disp Refills    finasteride (PROSCAR) 5 MG tablet [Pharmacy Med Name: FINASTERIDE 5MG TABLETS] 90 tablet 0     Sig: TAKE 1 TABLET BY MOUTH EVERY DAY       Date of last visit: 8-2018  Date of next visit (if applicable):  for surveillance cysto    Pt doesn't have enough to get him through. Please call in 1 year supply if possible. Thanks.

## 2018-11-01 PROBLEM — R55 SYNCOPE AND COLLAPSE: Status: ACTIVE | Noted: 2018-01-01

## 2018-11-01 NOTE — ED PROVIDER NOTES
light-headedness, numbness and headaches. Hematological: Negative for adenopathy. Psychiatric/Behavioral: Negative for confusion and suicidal ideas. The patient is not nervous/anxious. PAST MEDICAL HISTORY    has a past medical history of Ankle fracture; Arthritis; CAD (coronary artery disease); Cancer ([address] Cervical vertebra fusion; Chronic kidney disease; Colon polyps; GERD (gastroesophageal reflux disease); Hyperlipemia; Melanoma ([address] Other disorders of kidney and ureter in diseases classified elsewhere; Parkinson disease ([address] Parkinson disease ([address] Sleep apnea; Spinal stenosis; Subdural hematoma ([address] and Syncope. SURGICAL HISTORY      has a past surgical history that includes Cardiac catheterization (2009); Tonsillectomy; Hemorrhoid surgery (1994); Cataract removal (Bilateral, 1995); cervical fusion (2009); Skin cancer excision; bladder tumor excision (2011); Blepharoplasty (2007, 2008); Colonoscopy (9-24-10); Cystoscopy (.6/03/2015); Bladder surgery (5/31/11); skin biopsy; Skin cancer excision; back surgery; eye surgery; pr cystourethroscopy,fulgur . 5-2cm lesn (N/A, 8/3/2018); and Cystocopy (08/2018).     CURRENT MEDICATIONS       Current Discharge Medication List      CONTINUE these medications which have NOT CHANGED    Details   finasteride (PROSCAR) 5 MG tablet TAKE 1 TABLET BY MOUTH EVERY DAY  Qty: 90 tablet, Refills: 0      rOPINIRole (REQUIP) 0.5 MG tablet Take 1 tablet by mouth 3 times daily  Qty: 270 tablet, Refills: 1      omeprazole (PRILOSEC) 20 MG delayed release capsule Take 1 capsule by mouth Daily  Qty: 90 capsule, Refills: 1    Associated Diagnoses: Gastroesophageal reflux disease without esophagitis      aspirin 81 MG EC tablet Take 1 tablet by mouth daily  Qty: 30 tablet, Refills: 3      tamsulosin (FLOMAX) 0.4 MG capsule TAKE 1 CAPSULE BY MOUTH EVERY NIGHT  Qty: 90 capsule, Refills: 3      carbidopa-levodopa (SINEMET)  MG per tablet TAKE 1 TABLET BY MOUTH THREE TIMES DAILY  Qty: 270 tablet, Refills: 3    Comments: **Patient requests 90 days supply**  Associated Diagnoses: Parkinson's disease ([facility]      amLODIPine (NORVASC) 2.5 MG tablet Take 1 tablet by mouth daily  Qty: 90 tablet, Refills: 1    Associated Diagnoses: Coronary artery disease involving native coronary artery of native heart without angina pectoris      solifenacin (VESICARE) 5 MG tablet TAKE 1 TABLET NIGHTLY  Qty: 90 tablet, Refills: 1      Coenzyme Q10 (COQ10) 400 MG CAPS Take 1 capsule by mouth daily      Multiple Vitamins-Minerals (PRESERVISION/LUTEIN) CAPS Take 1 capsule by mouth 2 times daily      cetirizine (ZYRTEC) 10 MG tablet Take 10 mg by mouth daily. fish oil-omega-3 fatty acids 1000 MG capsule Take 1 g by mouth daily       Multiple Vitamin (MULTIVITAMIN PO)   Take 1 tablet by mouth daily              ALLERGIES     is allergic to sulfa antibiotics. FAMILY HISTORY     indicated that his mother is . He indicated that his father is . He indicated that the status of his maternal grandfather is unknown. He indicated that the status of his neg hx is unknown.    family history includes Heart Attack in his father; Heart Disease in his mother; Heart Disease (age of onset: 37) in his father; Heart Surgery in his mother; Stroke in his maternal grandfather. SOCIAL HISTORY      reports that he quit smoking about 40 years ago. He has never used smokeless tobacco. He reports that he does not drink alcohol or use drugs. PHYSICAL EXAM     INITIAL VITALS:  height is 5' 10\" (1.778 m) and weight is 156 lb 12.8 oz (71.1 kg). His oral temperature is 97.8 °F (36.6 °C). His blood pressure is 142/63 (abnormal) and his pulse is 73. His respiration is 16 and oxygen saturation is 93%. Physical Exam   Constitutional: He is oriented to person, place, and time. He appears well-developed and well-nourished. Non-toxic appearance. HENT:   Head: Normocephalic and atraumatic.    Right Ear: in the absence of acardiologist.  EKG interpreted by Amos Maradiaga MD:    Vent. Rate: 70 bpm  SC interval: 182 ms  QRS duration: 128 ms  QTc: 488 ms  P-R-T axes: 55, -60, 10  No STEMI. EKG gives impression of NSR and a RBBB. Compared to old EKG on 3-Jul-2018. RADIOLOGY: non-plain film images(s) such as CT, Ultrasound and MRI are read by the radiologist.    XR CHEST STANDARD (2 VW)   Final Result   1. Changes of COPD with cardiac enlargement. 2. No acute appearing changes compared to prior study         **This report has been created using voice recognition software. It may contain minor errors which are inherent in voice recognition technology. **      Final report electronically signed by Dr. Kyle Walden on 11/1/2018 8:20 PM      CT HEAD WO CONTRAST   Final Result   Stable atrophy with loculation of the right inferior frontal lobe. No active pathology changes present. **This report has been created using voice recognition software. It may contain minor errors which are inherent in voice recognition technology. **      Final report electronically signed by Dr. Kyle Walden on 11/1/2018 8:08 PM          LABS:   Labs Reviewed   CBC WITH AUTO DIFFERENTIAL - Abnormal; Notable for the following:        Result Value    RBC 4.62 (*)     MCV 95.7 (*)     RDW-SD 47.0 (*)     All other components within normal limits   COMPREHENSIVE METABOLIC PANEL - Abnormal; Notable for the following:     Glucose 113 (*)     ALT <5 (*)     All other components within normal limits   PROTIME-INR   BRAIN NATRIURETIC PEPTIDE   APTT   TROPONIN   ANION GAP   GLOMERULAR FILTRATION RATE, ESTIMATED   OSMOLALITY   URINE RT REFLEX TO CULTURE   POCT GLUCOSE       EMERGENCY DEPARTMENT COURSE:   Vitals:    Vitals:    11/01/18 2059 11/01/18 2213 11/01/18 2244 11/02/18 0043   BP: (!) 137/93 (!) 151/74 (!) 161/70 (!) 142/63   Pulse: 65 65 67 73   Resp: 18 18 18 16   Temp:   97.6 °F (36.4 °C) 97.8 °F (36.6 °C)   TempSrc:   Oral

## 2018-11-02 NOTE — PROGRESS NOTES
See history and physical    IMPRESSION    Syncope and collapse    parkinsons      ?  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

## 2018-11-02 NOTE — ED NOTES
Pt walked to the restroom, pt states that he does fel more unstable than usual, respirs are easy and unlabored, pt is still unable to provide a urine sample at this time     Arlene Guo RN  11/01/18 2100

## 2018-11-02 NOTE — CONSULTS
[address] [place] [address]                                  CONSULTATION    PATIENT NAME: Camryn Holliday                        :        1931  MED REC NO:   [id number]                           ROOM:       0024  ACCOUNT NO:   [de-identified]                           ADMIT DATE: 2018  PROVIDER:     Sallie Christy. BE Nashaldo Score:  2018    HISTORY OF PRESENT ILLNESS:  This patient is an 30-year-old gentleman  who was in the hospital with his wife through the whole day. He did not  eat or drink anything. He went home and he was standing watching his  daughter doing some event in the house and then he felt dizzy. He sat  down and then he passed out. He denied palpitations. Denied chest  pain. Denied shortness of breath. The patient has no seizure activity. The patient did not have history of coronary artery disease and he had  significant disease of the mid RCA. He is to have the intervention on  Monday. The patient had no prior history of congestive heart failure. REVIEW OF SYSTEMS:  The patient had a history of CA of the bladder and  he did have surgery on the bladder. He has no GI bleed or bleeding  disorders. The patient has no CVA or TIA. He had a history of hypertension. He  had a history of subdural hematoma in the past.  He had a history of  sleep apnea, history of Parkinson disease. He has gastroesophageal  reflux. He had a history of C-spine surgery. The patient had a history  of right cheek basal cell carcinoma and a bladder cancer. The patient  had a history of arthritis. This patient had history of peripheral vascular disease, history of back  surgery. SOCIAL HISTORY:  He denied smoking or alcohol abuse. The patient is a  former smoker. ALLERGIES:  He is allergic to SULFA. CODE STATUS:  He is a full code. PHYSICAL EXAMINATION:  VITAL SIGNS:  Blood pressure is 120/80.   He is in sinus rhythm. He is  afebrile. Respiratory rate is 18. CNS:  The patient has no focal neurological deficits. NECK:  He has no JVD. No carotid bruits. There is no thyromegaly. He  has no lymphadenopathy. ENT:  No discharge from the throat, the ear or the nose. LUNGS:  Scattered expiratory rhonchi. HEART:  There is no S3.  ABDOMEN:  Soft. GENITALIA:  Deferred. RECTAL:  Deferred. LOWER EXTREMITIES:  There is no edema. LABORATORY DATA:  This patient's lab work was within normal limits. EKG  showed no acute changes. Troponin is negative. SUMMARY:  1. The patient with syncopal episode, most probably postural  hypotension. Encourage for the fluid intake. 2.  History of coronary artery disease. The patient is scheduled for  the intervention of the RCA. To keep his appointment as it is. 3.  History of Parkinson's disease. 4.  History of CA of the bladder. 5.  History of arthritis. 6.  The patient cardiac wise is stable. He will continue his home  MEDICATIONS:.    DISCHARGE PLAN:   Per the admitting service. We thank you very much for allowing us to share in the management of  this patient.         Rupa Wyatt M.D.    D: 11/02/2018 9:23:55       T: 11/02/2018 10:11:40     [email or web address]  Job#: [id number]     Doc#: [id number]    CC:

## 2018-11-02 NOTE — PROGRESS NOTES
[facility] Laboratory Technician Worksheet      EEG Date: 2018    Name: Rocael Scales   : 1931   Age: 80 y.o.   SEX: male    ROOM: 20 MRN: [id number]           CSN: [id number]    Ordering Provider: Andrés  EEG Number: [id number] Time of Test:  7979    Hand: Right   Sedation: no    H.V. Done: No  not attempted Photic: Yes    Sleep: Yes  Drowsy: Yes   Sleep Deprived: No    Seizures observed: no    Technician Impression:1    Mentality: alert      Clinical History:  DX:  Syncope and Collapse  AMS  Possible LOC   Pt fell in bathroom    Head CT shows no acute findings, stable atrophy with loculation of rt inferior frontal lobe    normal EEG    H/O Parkinson's disease, RISHABH, etc.    Past Medical History:       Diagnosis Date    Ankle fracture 2016    left in cast    Arthritis     CAD (coronary artery disease)     margarette    Cancer ([facility]     right cheek-basal cell,neck,bladder    Cervical vertebra fusion     Chronic kidney disease     Colon polyps     abrahan    GERD (gastroesophageal reflux disease)     Hyperlipemia     Melanoma ([facility]     Neck, Scalp    Other disorders of kidney and ureter in diseases classified elsewhere     Parkinson disease ([facility] 2016    Parkinson disease ([facility]     Sleep apnea     cpap    Spinal stenosis     Subdural hematoma ([facility]        resolved     Syncope        Scheduled Meds:   amLODIPine  2.5 mg Oral Daily    aspirin  81 mg Oral Daily    carbidopa-levodopa  1 tablet Oral TID    cetirizine  10 mg Oral Daily    finasteride  5 mg Oral Daily    omega-3 acid ethyl esters  1 g Oral Daily    multivitamin  1 tablet Oral Daily    pantoprazole  40 mg Oral QAM AC    rOPINIRole  0.5 mg Oral TID    tamsulosin  0.4 mg Oral Daily    sodium chloride flush  10 mL Intravenous 2 times per day    enoxaparin  40 mg Subcutaneous Daily     Continuous Infusions:   dextrose 5% and 0.45% NaCl with KCl 20 mEq 75 mL/hr at

## 2018-11-02 NOTE — PROGRESS NOTES
BLADDER TUMOR EXCISION  2011    BLEPHAROPLASTY  2007, 2008   [facility] CARDIAC CATHETERIZATION  2009    moderate    margarette    CATARACT REMOVAL Bilateral 1995    CERVICAL FUSION  2009    COLONOSCOPY  9-24-10      abrahan  wnl    CYSTOSCOPY  .6/03/2015    CYSTOSCOPY  08/2018    bladder tumor    baer    EYE SURGERY      HEMORRHOID SURGERY  1994    ND CYSTOURETHROSCOPY,FULGUR .5-2CM KELSIE N/A 8/3/2018    CYSTOSCOPY TRANSURETHRAL RESECTION BLADDER TUMOR, SMALL performed by Claude Yanez MD at [address]      left shin - Dr Jesica Cai      neck and scalp melanoma, cheek basal cell    SKIN CANCER EXCISION      left rivera - Dr Kiran Guerrero         Restrictions/Precautions:  Fall Risk                            Subjective:  Chart Reviewed: Yes  Patient assessed for rehabilitation services?: Yes  Family / Caregiver Present: No  Subjective: Patient sitting up in a chair upon PT arrival, agreeable to therapy. He offers no complaints at this time. General:  Overall Orientation Status: Within Normal Limits  Follows Commands: Within Functional Limits    Vision: Impaired  Vision Exceptions: Wears glasses at all times    Hearing: Within functional limits         Pain:  Denies.           Social/Functional History:    Lives With: Spouse  Type of Home: House  Home Layout: One level (+ basement, but does not have to go down there)  Home Access: Stairs to enter without rails  Entrance Stairs - Number of Steps: 2  Home Equipment: Cane     Bathroom Shower/Tub: Walk-in shower  Bathroom Equipment: Built-in shower seat, Shower chair    Receives Help From: Family  ADL Assistance: Independent  Homemaking Assistance: Needs assistance (wife primarily completes the IADLs)  Ambulation Assistance: Independent  Transfer Assistance: Independent    Active : Yes (but does not drive at night)            Objective:       RLE AROM: WNL  LLE AROM : WNL         Strength RLE: [facility]    Strength LLE: WFL      Sensation  Overall Sensation Status: WNL    RLE Tone: Normotonic  LLE Tone: Normotonic       Balance  Posture: Good  Sitting - Static: Good  Sitting - Dynamic: Good  Standing - Static: Good  Standing - Dynamic: Fair, +         Transfers  Sit to Stand: Stand by assistance  Stand to sit: Stand by assistance       Ambulation 1  Surface: level tile  Device: No Device  Assistance: Stand by assistance  Quality of Gait: pt demos an overall unsteady gait with scissoring noted at times, with a few small LOBs in which he was able to self correct  Distance: 40'  Comments: cues for safety    Ambulation 2  Surface - 2: level tile  Device 2: Single point cane  Assistance 2: Contact guard assistance  Distance: 40'  Comments: noted improvements in balance with use of cane vs with no AD--no LOB noted with use of cane      Balance Score: 10  Gait Score: 9  Tinetti Total Score: 19                   Activity Tolerance:  Activity Tolerance: Patient Tolerated treatment well    Treatment Initiated: PT eval completed. Also completed gait training with cane, see above. Assessment: Body structures, Functions, Activity limitations: Decreased functional mobility , Decreased balance  Assessment: Patient tolerated PT session well. He presents with some impairments in his balance, which affects his overall safety and functional independence. Prognosis: Good    Clinical Presentation: Low - Stable and Uncomplicated: Qing Hernandez  An analysis of data from a problem focused assessment, a consideration of a limited number of treatment options, no significant comorbidities affecting the POC and no modifications or assistance needed to complete the eval.    Decision Making: Low Complexitybased on patient assessment and decision making process of determining plan of care and establishing reasonable expectations for measurable functional outcomes    REQUIRES PT FOLLOW UP: Yes    Discharge Recommendations:  Discharge Recommendations: Home with

## 2018-11-03 NOTE — FLOWSHEET NOTE
Initial spiritual care contact. Pt is with his wife and daughter. He welcomed prayer. Pt is an 80year old male on 3B.       11/03/18 1300   Encounter Summary   Services provided to: Patient and family together   Referral/Consult From: [facility]; [employer]   Place of [address] [place] [address] Completed  (by family)   Continue Visiting Yes  (11/3)   Complexity of Encounter Moderate   Length of Encounter 15 minutes   Spiritual/Jewish   Type Spiritual support   Assessment Approachable   Intervention Nurtured hope;Prayer   Outcome Coping;Encouraged; Hopeful

## 2018-11-03 NOTE — PROCEDURES
[address] [place] [address]                          ELECTROENCEPHALOGRAM REPORT    PATIENT NAME: Hunter Medina                        :        1931  MED REC NO:   [id number]                           ROOM:       0024  ACCOUNT NO:   [de-identified]                           ADMIT DATE: 2018  PROVIDER:     Jamie Motta. Rosalind Cool MD      DATE OF EE2018    REFERRING PROVIDER:  Mario Bowen M.D. CLINICAL HISTORY: An 68-year-old male with syncope and collapse, altered  mental status, possible loss of consciousness. CLINICAL INTERPRETATION:  This is a 17-channel EEG performed without  sleep deprivation. Hyperventilation was not performed. Photic  stimulation was performed. The patient is described as alert. The background rhythm activity is noted to be 8 Hz in the posterior  parietal area, symmetric, well modulated, attenuates with eye opening. The patient noted to be drowsy during parts of recording. Hyperventilation was not performed. Leads and muscle artifacts were  noted limiting quality of data obtained. Photic stimulation was  performed without abnormality. The patient was noted to be asleep  during parts of recording. IMPRESSION: This is a normal EEG. There was no evidence of epileptiform  activity appreciated.         Makenna Gonzalez MD    D: 2018 9:08:14       T: 2018 9:21:25     [id number]  Job#: [id number]     Doc#: [id number]    CC:

## 2018-11-03 NOTE — H&P
is some lack of memory of the event being present. He  denies any other significant issues of slurred speech, numbness, or  weakness being present. His gait is already somewhat limited because of  his Parkinson's with some rigidity and some bradykinesia type symptoms  being present. He has had prior falls in the past being present in  addition. PHYSICAL EXAMINATION:  VITAL SIGNS:  Temperature 98.1, pulse 67, respiratory rate 18, blood  pressure 132/62, pulse ox 90%. GENERAL:  Notes elderly male, no distress at this time. He is unable to  recall the events from the prior night. HEAD:  Atraumatic, normocephalic. EYES:  PERRL with normal sclerae. EARS:  Normal TMs. NOSE AND THROAT:  Clear without exudate. NECK:  Supple without adenopathy or any increased thyroid. LUNGS:  Clear with normal respiratory rate and effort. CARDIAC:  Regular rate and rhythm. Normal S1, S2 without murmur,  gallop, or rub. No carotid bruits. Peripheral pulses 2+. No  peripheral edema. ABDOMEN:  Soft, positive bowel sounds, nontender. No hepatomegaly, no  masses. EXTREMITIES:  Full range of motion. No CVA tenderness. NEUROLOGIC:  There is some confusion being present as far as concerning  the event being noted. His speech is normal.  Cranial nerves II through  XII were intact. He has got some bradykinesia being present. No real  distinct rigidity being present. Gait shuffles a little bit at times,  although we did not examine this today. There is no other numbness or  weakness present. MUSCULOSKELETAL:  Otherwise stable. LABORATORY DATA:  Notes his sodium was 143, potassium 4.1, and chloride  105, CO2 27, BUN 19, creatinine of 0.8. His BNP was 210. Troponin  level was less than 0.010. Liver panel was normal.  His white count  7700, hemoglobin 14.7, hematocrit 44.2, platelet count of 554,465,  normal differential.  INR is 1.07. His BNP was less than 210. His  troponin level less than 0.010.   Urinalysis did

## 2018-11-05 NOTE — PROGRESS NOTES
[facility] 60  INPATIENT OCCUPATIONAL THERAPY  [facility] CCU-STEPDOWN 3B  EVALUATION    Time:  Time In: 1483  Time Out: 1210  Timed Code Treatment Minutes: 10 Minutes  Minutes: 25          Date: 2018  Patient Name: Hi Lassiter,   Gender: male      MRN: [id number]  : 1931  (80 y.o.)  Referring Practitioner: Dr. Arturo Chavira  Diagnosis: syncope and collapse  Additional Pertinent Hx: 80 y.o. male who presents to the Emergency Department via EMS for the evaluation of altered mental status and possible loss of consciousness. Per EMS report, the patient was at home in the bathroom when the patient's daughter heard a \"thud\".  EMS was called and brought the patient to the ED for evaluation    Restrictions/Precautions:  Fall Risk                            Past Medical History:   Diagnosis Date    Ankle fracture 2016    left in cast    Arthritis     CAD (coronary artery disease)     margarette    Cancer ([address]     right cheek-basal cell,neck,bladder    Cervical vertebra fusion     Chronic kidney disease     Colon polyps     abrahan    GERD (gastroesophageal reflux disease)     Hyperlipemia     Melanoma ([address]     Neck, Scalp    Other disorders of kidney and ureter in diseases classified elsewhere     Parkinson disease ([address]     Parkinson disease ([address]     Sleep apnea     cpap    Spinal stenosis     Subdural hematoma ([address]        resolved     Syncope      Past Surgical History:   Procedure Laterality Date    BACK SURGERY      BLADDER SURGERY  11    BLADDER TUMOR EXCISION      BLEPHAROPLASTY  ,     CARDIAC CATHETERIZATION  2009    moderate    margarette    CATARACT REMOVAL Bilateral 1995    CERVICAL FUSION  2009    COLONOSCOPY  9-24-10      abrahan byrd    CYSTOSCOPY  .2015    CYSTOSCOPY  2018    bladder tumor    [place]    EYE SURGERY      HEMORRHOID SURGERY      DE CYSTOURETHROSCOPY,FULGUR .5-2CM KELSIE N/A 8/3/2018    CYSTOSCOPY

## 2018-11-05 NOTE — OP NOTE
[facility]  Sedation/Analgesia Post Sedation Record      Pt Name: Rocael Scales  MRN: [id number]  YOB: 1931  Procedure Performed By: [address], MD  Primary Care Physician: Susanne Fernandez MD    POST-PROCEDURE    Physician: [address], MD    Procedure Performed:   Stent rca  Percutaneous Coronary Intervention and ivus rca   Sedation/Anesthesia:  Local Anesthesia and IV Conscious Sedation with continuous O2 monitoring    Estimated Blood Loss:  Minimal    Specimens Removed:  None    Complications:  None     Post Procedure Diagnosis/Findings:  Coronary Artery Disease    Recommendations:  Medical treatment and review films.        Issac Donahue MD  Electronically signed 11/5/2018 at 1:36 PM

## 2018-11-05 NOTE — PROGRESS NOTES
Oral Daily    carbidopa-levodopa  1 tablet Oral TID    cetirizine  10 mg Oral Daily    finasteride  5 mg Oral Daily    omega-3 acid ethyl esters  1 g Oral Daily    multivitamin  1 tablet Oral Daily    pantoprazole  40 mg Oral QAM AC    rOPINIRole  0.5 mg Oral TID    tamsulosin  0.4 mg Oral Daily    sodium chloride flush  10 mL Intravenous 2 times per day    enoxaparin  40 mg Subcutaneous Daily     Prior to Admission medications    Medication Sig Start Date End Date Taking? Authorizing Provider   finasteride (PROSCAR) 5 MG tablet TAKE 1 TABLET BY MOUTH EVERY DAY 10/9/18  Yes KATE Valentine - CNP   rOPINIRole (REQUIP) 0.5 MG tablet Take 1 tablet by mouth 3 times daily 8/27/18  Yes Rocky Chapman MD   omeprazole (PRILOSEC) 20 MG delayed release capsule Take 1 capsule by mouth Daily 8/14/18  Yes Jazmin Viveros MD   aspirin 81 MG EC tablet Take 1 tablet by mouth daily 8/4/18  Yes KATE Mckeon - CNP   tamsulosin (FLOMAX) 0.4 MG capsule TAKE 1 CAPSULE BY MOUTH EVERY NIGHT 6/18/18  Yes Jonathan Ocampo MD   carbidopa-levodopa (SINEMET)  MG per tablet TAKE 1 TABLET BY MOUTH THREE TIMES DAILY 6/11/18  Yes Rocky Chapman MD   amLODIPine (NORVASC) 2.5 MG tablet Take 1 tablet by mouth daily 6/2/18  Yes Jazmin Viveros MD   solifenacin (VESICARE) 5 MG tablet TAKE 1 TABLET NIGHTLY 6/2/18  Yes Jazmin Viveros MD   Coenzyme Q10 (COQ10) 400 MG CAPS Take 1 capsule by mouth daily   Yes Historical Provider, MD   Multiple Vitamins-Minerals (PRESERVISION/LUTEIN) CAPS Take 1 capsule by mouth 2 times daily   Yes Historical Provider, MD   cetirizine (ZYRTEC) 10 MG tablet Take 10 mg by mouth daily.      Yes Historical Provider, MD   fish oil-omega-3 fatty acids 1000 MG capsule Take 1 g by mouth daily    Yes Historical Provider, MD   Multiple Vitamin (MULTIVITAMIN PO)   Take 1 tablet by mouth daily    Yes Historical Provider, MD       Vital Signs  Vitals:    11/04/18 2035   BP: (!) 160/74

## 2018-11-06 NOTE — PROGRESS NOTES
Signed off with primary nurse Monica Stauffer RN. Patient sitting in room with wife with door closed. Patient in bed, eyes open, breathing regular and unlabored.     Electronically signed by Aroldo PALOMARES on 11/6/2018 at 1:46 PM

## 2018-11-06 NOTE — PROGRESS NOTES
Wes Pardo is a 80 y.o. male patient.     Current Facility-Administered Medications   Medication Dose Route Frequency Provider Last Rate Last Dose    ALPRAZolam (XANAX) tablet 0.5 mg  0.5 mg Oral Once PRN Bryn Singh MD        diphenhydrAMINE (BENADRYL) tablet 25 mg  25 mg Oral Once PRN Bryn Singh MD        diphenhydrAMINE (BENADRYL) tablet 50 mg  50 mg Oral Once Bryn Singh MD        hydrocortisone sodium succinate PF (SOLU-CORTEF) injection 200 mg  200 mg Intravenous Once Bryn Singh MD        nitroGLYCERIN (NITROSTAT) SL tablet 0.4 mg  0.4 mg Sublingual Q5 Min PRN Bryn Singh MD        sodium chloride flush 0.9 % injection 10 mL  10 mL Intravenous 2 times per day Mignon Hayward MD   10 mL at 11/05/18 2012    sodium chloride flush 0.9 % injection 10 mL  10 mL Intravenous PRN Bryn Singh MD        atropine injection 0.5 mg  0.5 mg Intravenous Once PRN Bryn Singh MD        [facility]) tablet 90 mg  90 mg Oral BID Mignon Hayward MD   90 mg at 11/05/18 2016    0.9 % sodium chloride infusion   Intravenous Continuous Bryn Singh  mL/hr at 11/05/18 1410      trospium (SANCTURA) tablet 20 mg  20 mg Oral Daily Atul Latham MD   20 mg at 11/05/18 1707    acetaminophen (TYLENOL) tablet 650 mg  650 mg Oral Q4H PRN Mignon Hayward MD   650 mg at 11/03/18 1616    cefUROXime (CEFTIN) tablet 250 mg  250 mg Oral 2 times per day Naima Celeste MD   250 mg at 11/05/18 2011    amLODIPine (NORVASC) tablet 2.5 mg  2.5 mg Oral Daily Atul Latham MD   2.5 mg at 11/05/18 0841    aspirin EC tablet 81 mg  81 mg Oral Daily Atul Latham MD   81 mg at 11/04/18 0900    carbidopa-levodopa (SINEMET)  MG per tablet 1 tablet  1 tablet Oral TID Atul Latham MD   1 tablet at 11/05/18 2011    cetirizine (ZYRTEC) tablet 10 mg  10 mg Oral Daily Atul Latham MD   10 mg at 11/04/18 1035    finasteride (PROSCAR) tablet 5 mg  5 mg Oral Daily Joanne Sampson

## 2018-11-06 NOTE — PLAN OF CARE
Problem: Tissue Perfusion - Peripheral, Altered:  Goal: Absence of hematoma at arterial access site  Absence of hematoma at arterial access site  Outcome: Ongoing  Cath site checks performed. No evidence of swelling, drainage, or hematoma noted to site, will continue to monitor. Goal: Circulatory function of lower extremities is within specified parameters  Circulatory function of lower extremities is within specified parameters  Outcome: Ongoing  Patient on continuous telemetry monitoring, heart tones, vitals & pulses checked at least 3 times per shift & PRN. Vitals within acceptable limits. Peripheral pulses palpable. Problem: Tissue Perfusion - Renal, Altered:  Goal: Serum creatinine will be within specified parameters  Serum creatinine will be within specified parameters  Outcome: Ongoing  Labs checked per physician's orders. Comments: Care plan reviewed with patient and his wife, Winston Mcintyre. Pat verbalizes understanding of the plan of care and contribute to goal setting.

## 2018-11-06 NOTE — PROCEDURES
[address] [id number]                            CARDIAC CATHETERIZATION    PATIENT NAME: Marli Childress                        :        1931  MED REC NO:   [id number]                           ROOM:       0024  ACCOUNT NO:   [de-identified]                           ADMIT DATE: 2018  PROVIDER:     Herminia Roach. Shukri Abdullahi M.D.    [facility] [place]:  2018    INDICATION FOR PROCEDURE:  This is a patient who is an 14-year-old  gentleman with known history of coronary artery disease, coronary  _____(0:20) cath in the past.  This patient did have a stress Cardiolite  test which was abnormal.  The patient was to have an intervention of the  RCA. He was admitted to the hospital with a syncopal episode. He had a  history of loop recorder placement for the above and this was negative  for arrhythmia. The patient admitted for intervention. The patient understood the procedure, the benefits, risks, and  alternative methods of treatment, possible complications, and wants it  to be done. PROCEDURE PERFORMED:  1.  IV conscious sedation:  The patient was given IV conscious sedation  in incremental dosage by the circulating cath lab RN, monitored by the  cath lab monitor tech under my supervision. During the procedure, the  patient had no acute complications. The procedure was started at 12:55  p.m. and finished at 1:30 p.m. No acute complications from the  procedure. 2.  Intervention of the RCA - IVUS of the RCA. 3.  The right femoral artery cannulated with a 6-Nepali sheath. 4.  The RCA was cannulated with a right Amplatz guide catheter. BMW  wire and then a Whisper wire was placed at the distal end of the RCA. 5.  The IVUS catheter could only reach to the mid lesion and could not  pass the distal portion of the lesion due to the tortuosity and  significant stenosis apparently.   6.  There is narrowing of the inner lumen part of

## 2018-11-07 NOTE — CARE COORDINATION
symptoms?:  Yes  Patient-reported symptoms:  Rash, Fatigue  Do you have a copy of your discharge instructions?:  Yes  Do you have all of your prescriptions and are they filled?:  Yes  Have you been contacted by a [employer] [address]?:  No  Have you scheduled your follow up appointment?:  Yes  How are you going to get to your appointment?:  Car - family or friend to transport  Were you discharged with any Home Care or Post Acute Services:  No  Do you feel like you have everything you need to keep you well at home?:  Yes  Care Transitions Interventions  No Identified Needs         Follow Up  Future Appointments  Date Time Provider Flavio Fish   11/12/2018 2:30 PM [facility]   11/13/2018 4:10 PM Sherin Herrmann MD [facility]   12/3/2018 12:30 PM MD YANA Harvey AM [address] Urology [facility] YANA STEWARD AM [address]   12/5/2018 1:30 PM Estephania Lorenzo MD [address] [facility]  [phone]

## 2018-11-07 NOTE — PROGRESS NOTES
Marcelle Francisco is a 80 y.o. male patient. No current facility-administered medications for this encounter. Current Outpatient Prescriptions   Medication Sig Dispense Refill    nitroGLYCERIN (NITROSTAT) 0.4 MG SL tablet up to max of 3 total doses. If no relief after 1 dose, call 911. 25 tablet 3    ticagrelor (BRILINTA) 90 MG TABS tablet Take 1 tablet by mouth 2 times daily 60 tablet 3    cefUROXime (CEFTIN) 250 MG tablet Take 1 tablet by mouth every 12 hours for 5 days 10 tablet 0    predniSONE (DELTASONE) 20 MG tablet One tab po bid for 4 days then stop 8 tablet 0    finasteride (PROSCAR) 5 MG tablet TAKE 1 TABLET BY MOUTH EVERY DAY 90 tablet 0    rOPINIRole (REQUIP) 0.5 MG tablet Take 1 tablet by mouth 3 times daily 270 tablet 1    omeprazole (PRILOSEC) 20 MG delayed release capsule Take 1 capsule by mouth Daily 90 capsule 1    aspirin 81 MG EC tablet Take 1 tablet by mouth daily 30 tablet 3    tamsulosin (FLOMAX) 0.4 MG capsule TAKE 1 CAPSULE BY MOUTH EVERY NIGHT 90 capsule 3    carbidopa-levodopa (SINEMET)  MG per tablet TAKE 1 TABLET BY MOUTH THREE TIMES DAILY 270 tablet 3    amLODIPine (NORVASC) 2.5 MG tablet Take 1 tablet by mouth daily 90 tablet 1    Coenzyme Q10 (COQ10) 400 MG CAPS Take 1 capsule by mouth daily      Multiple Vitamins-Minerals (PRESERVISION/LUTEIN) CAPS Take 1 capsule by mouth 2 times daily      cetirizine (ZYRTEC) 10 MG tablet Take 10 mg by mouth daily.  fish oil-omega-3 fatty acids 1000 MG capsule Take 1 g by mouth daily       Multiple Vitamin (MULTIVITAMIN PO)   Take 1 tablet by mouth daily       VESICARE 5 MG tablet TAKE 1 TABLET NIGHTLY 90 tablet 1     Allergies   Allergen Reactions    Sulfa Antibiotics      Unknown reaction from childhood     Active Problems:    Syncope and collapse  Resolved Problems:    * No resolved hospital problems. *    Blood pressure (!) 116/56, pulse 64, temperature 98.2 °F (36.8 °C), temperature source Oral, resp.  rate 18, height 5' 10\" (1.778 m), weight 151 lb 6.4 oz (68.7 kg), SpO2 93 %. Subjective:  Symptoms:  Stable. Diet:  Adequate intake. Activity level: Returning to normal.    Pain:  He reports no pain. Objective:  General Appearance:  Comfortable. Vital signs: (most recent): Blood pressure (!) 116/56, pulse 64, temperature 98.2 °F (36.8 °C), temperature source Oral, resp. rate 18, height 5' 10\" (1.778 m), weight 151 lb 6.4 oz (68.7 kg), SpO2 93 %. Vital signs are normal.    Output: Producing urine and producing stool. HEENT: Normal HEENT exam.    Lungs:  Normal effort and normal respiratory rate. Breath sounds clear to auscultation. Heart: Normal rate. Regular rhythm. S1 normal and S2 normal.  No murmur. Abdomen: Abdomen is soft. Bowel sounds are normal.   There is no abdominal tenderness. Extremities: Normal range of motion. Neurological: Patient is alert and oriented to person, place and time. Normal strength. Patient has normal reflexes, normal muscle tone and normal coordination. Assessment:    Condition: In stable condition. Improving. (Syncope and  Collapse stable and right carotid stenosis noted and to follow  With  Neuro     htn stable      ashd noted  And s/p  Stent of RCA  And stable and now  With brillanta     uti  Noted and rx      parkinsons  And  Stable      hyperlipid  Noted and rx     ). Plan:   Discharge home. Encourage ambulation. Consults: cardiology and neurology. Advance diet as tolerated. Administer medications as ordered. (  meds  Adjusted  And doing well      continue with present  meds and to see neuro-interventional for  Carotid stenosis  Chart  Reviewed and medications reviewed and time spent over 35 Minutes       Home  Today and  Stable      finish  ceftin  For uti ).        Jean Marie Mistry MD  11/6/2018

## 2018-11-12 NOTE — PROGRESS NOTES
Neurocritical Care, Stroke & Neurointerventional Note    [facility] [place] [address] [place] [address],  [address] [facility], [address] [place] [address]   P: [phone]  Endovascular Neurosurgery Consult    Pt Name: Shi Parnell  MRN: [id number]  [place]: 11/24/1931  Date of evaluation: 11/12/2018  Primary Care Physician: Rozell Alpers, MD    Reason for evaluation: Carotid stenosis     SUBJECTIVE:   History of Chief Complaint:    Shi Parnell is a 80 y.o. male who presents with an episode of light headedness with warm feeling he is going to pass out then he had a syncope. He has tremor  He denies any stroke or TIA like symptoms    CTA showed right ICA 78% stenosis, however, I believe it is less than that (<50%)  Verts are ok      Allergies  is allergic to sulfa antibiotics. Medications  Prior to Admission medications    Medication Sig Start Date End Date Taking? Authorizing Provider   VESICARE 5 MG tablet TAKE 1 TABLET NIGHTLY 11/6/18  Yes Rozell Alpers, MD   nitroGLYCERIN (NITROSTAT) 0.4 MG SL tablet up to max of 3 total doses.  If no relief after 1 dose, call 911. 11/6/18  Yes Cande Ying MD   ticagrelor (BRILINTA) 90 MG TABS tablet Take 1 tablet by mouth 2 times daily 11/6/18  Yes Cande Ying MD   predniSONE (DELTASONE) 20 MG tablet One tab po bid for 4 days then stop 11/6/18  Yes Rozell Alpers, MD   finasteride (PROSCAR) 5 MG tablet TAKE 1 TABLET BY MOUTH EVERY DAY 10/9/18  Yes KATE Foster CNP   rOPINIRole (REQUIP) 0.5 MG tablet Take 1 tablet by mouth 3 times daily 8/27/18  Yes Monika Stephenson MD   omeprazole (PRILOSEC) 20 MG delayed release capsule Take 1 capsule by mouth Daily 8/14/18  Yes Rozell Alpers, MD   aspirin 81 MG EC tablet Take 1 tablet by mouth daily 8/4/18  Yes KATE Johns CNP   tamsulosin (FLOMAX) 0.4 MG capsule TAKE 1 CAPSULE BY MOUTH EVERY NIGHT 6/18/18  Yes Suzi Crawford MD   carbidopa-levodopa (SINEMET)  MG per tablet TAKE 1 TABLET

## 2018-11-13 NOTE — PROGRESS NOTES
mouth daily 90 tablet 1    Coenzyme Q10 (COQ10) 400 MG CAPS Take 1 capsule by mouth daily      Multiple Vitamins-Minerals (PRESERVISION/LUTEIN) CAPS Take 1 capsule by mouth 2 times daily      cetirizine (ZYRTEC) 10 MG tablet Take 10 mg by mouth daily.  fish oil-omega-3 fatty acids 1000 MG capsule Take 1 g by mouth daily       Multiple Vitamin (MULTIVITAMIN PO)   Take 1 tablet by mouth daily           Medications patient taking as of now reconciled against medications ordered at time of hospital discharge: Yes    Chief Complaint   Patient presents with   [address] from Hospital       HPI    Inpatient course: Discharge summary reviewed- see chart. Had  Stent   To  RCA        Syncope  And stable   Right  Carotid  Stenosis   Less 50%     Interval history/Current status: vasovagal   Spell   And  With  Bladder  infection     Review of Systems   Constitutional: Negative for fatigue and fever. HENT: Negative for congestion, ear pain, postnasal drip, sore throat and trouble swallowing. Eyes: Negative for pain. Respiratory: Negative for cough, chest tightness and shortness of breath. Cardiovascular: Negative for chest pain, palpitations and leg swelling. Gastrointestinal: Negative for abdominal pain, blood in stool, constipation and nausea. Genitourinary: Negative for difficulty urinating, frequency and urgency. Musculoskeletal: Negative for arthralgias, back pain, joint swelling and neck stiffness. Skin: Negative for rash. Neurological: Negative for dizziness, weakness and headaches. Hematological: Negative for adenopathy. Does not bruise/bleed easily. Psychiatric/Behavioral: Negative for behavioral problems, dysphoric mood and sleep disturbance. Vitals:    11/13/18 1624   BP: 124/66   Site: Left Upper Arm   Position: Sitting   Cuff Size: Medium Adult   Pulse: 56   Resp: 16   Weight: 155 lb 9.6 oz (70.6 kg)   Height: 5' 10\" (1.778 m)     Body mass index is 22.33 kg/m².    Wt Readings from Last 3 Encounters:   11/13/18 155 lb 9.6 oz (70.6 kg)   11/06/18 151 lb 6.4 oz (68.7 kg)   08/17/18 155 lb (70.3 kg)     BP Readings from Last 3 Encounters:   11/13/18 124/66   11/12/18 (!) 165/73   11/06/18 (!) 116/56       Physical Exam   Constitutional: He is oriented to person, place, and time. He appears well-developed and well-nourished. HENT:   Head: Normocephalic and atraumatic. Right Ear: External ear normal.   Left Ear: External ear normal.   Nose: Nose normal.   Mouth/Throat: Oropharynx is clear and moist.   Eyes: Pupils are equal, round, and reactive to light. Conjunctivae and EOM are normal.   Fundi nl   Neck: Normal range of motion. Neck supple. No thyromegaly present. Cardiovascular: Normal rate, regular rhythm, normal heart sounds and intact distal pulses. Pulmonary/Chest: Effort normal and breath sounds normal. He has no wheezes. He has no rales. Abdominal: Soft. Bowel sounds are normal. He exhibits no mass. There is no tenderness. Musculoskeletal: Normal range of motion. Lymphadenopathy:     He has no cervical adenopathy. Neurological: He is alert and oriented to person, place, and time. He has normal reflexes. No cranial nerve deficit. Skin: Skin is warm and dry. No rash noted. Psychiatric: He has a normal mood and affect. Nursing note and vitals reviewed. Assessment/Plan:  There are no diagnoses linked to this encounter. Medical Decision Making: moderate complexity     Diagnosis Orders   1. Syncope and collapse     2. Parkinson disease ([address]     3. Vasovagal syncope     4. Coronary artery disease involving native coronary artery of native heart without angina pectoris     5. Acute cystitis without hematuria           PLAN    Current Outpatient Prescriptions   Medication Sig Dispense Refill    VESICARE 5 MG tablet TAKE 1 TABLET NIGHTLY 90 tablet 1    nitroGLYCERIN (NITROSTAT) 0.4 MG SL tablet up to max of 3 total doses.  If no relief after 1

## 2018-11-20 NOTE — PLAN OF CARE
Classes  [] ** Otoniel's nutritional goals are as follows:  [] Attend Nutrition Workshops  [] Attend 1:1   [] Attend Cooking Classes  [] Complete and return diet survey  [] ** Otoniel's nutritional goals are as follows:  [] Attend Nutrition Workshops  [] Attend 1:1   [] Attend Cooking Classes  [] ** Otoniel achieved nutritional goals   [] Yes    [] No  If no, why? Use knowledge gained to continue Pritikin eating plan at home       Individual Cardiac Treatment Plan - Psychosocial  PSYCHOSOCIAL  ASSESSMENT/PLAN PSYCHOSOCIAL  REASSESSMENT PSYCHOSOCIAL   REASSESSMENT PSYCHOSOCIAL   REASSESSMENT PSYCHOSOCIAL  DISCHARGE/FOLLOW-UP   Stages of Change Stages of Change Stages of Change Stages of Change Stages of Change   [] Pre Contemplation  [] Contemplation  [] Preparation  [x] Action  [] Maintenance  [] Relapse [] Pre Contemplation  [] Contemplation  [] Preparation  [] Action  [] Maintenance  [] Relapse [] Pre Contemplation  [] Contemplation  [] Preparation  [] Action  [] Maintenance  [] Relapse [] Pre Contemplation  [] Contemplation  [] Preparation  [] Action  [] Maintenance  [] Relapse [] Pre Contemplation  [] Contemplation  [] Preparation  [] Action  [] Maintenance  [] Relapse   PSYCHOSOCIAL ASSESSMENT PSYCHOSOCIAL ASSESSMENT PSYCHOSOCIAL ASSESSMENT PSYCHOSOCIAL ASSESSMENT PSYCHOSOCIAL ASSESSMENT   Behavioral Outcomes Behavioral Outcomes Behavioral Outcomes Behavioral Outcomes Behavioral Outcomes   Tool Used:  Devin & Joaquin, Quality of Life Index, Cardiac Version IV  *Given to patient to complete.  Tool Used:    Devin & Nuñez, Quality of Life Index, Cardiac Version IV     QOL Index Score: **  HF:**  S&E:**  P&S: **  Family: **   Tool Used:     Devin & Joaquin, Quality of Life Index, Cardiac Version IV    QOL Index Score: **  HF:**  S&E:**  P&S: **  Family: **   PHQ-9 score 4  Depression Severity  []Minimal  [x]Mild   []Moderate  []Moderately Severe    []Severe    PHQ-9 score **  Depression Severity  []Minimal  []Mild

## 2018-11-20 NOTE — PROGRESS NOTES
Video Education Report - ICR/CR    Name:  Carol Love     Date:  11/20/2018  MRN: [id number]     Session #:  1  Session Length: 40 min    Recommended Videos        []01 [employer] - Program Overview   34:22    [x]02 Overview of Pritikin Eating Plan   34:10    []03 Becoming a Marnie Mola   33:08     []04 Diseases of Our Time - Part 1   34:22    []05 Calorie Density     33:39   []06 Label Reading - Part 1    32:15   []07 Move it      32.54   []08 Healthy Minds, Bodies, Hearts   32:14   []09 Dining Out - Part 1    32:28   []10 Heart Disease Risk Reduction   94:02   []54 Metabolic Syndrome and Belly Fat  31:52   []12 Facts on Fat     35:29   []13 Diseases of Our Time - Part 2   33:07   []14 Biology of Weight Control   32:36   []15 Biomechanical Limitations   35:20   []16 Nutrition Action Plan    34:23        Comments:  Video completed, No questions.

## 2018-11-21 NOTE — CARE COORDINATION
EXERCISE RM STRZ CAR PUL None   12/5/2018 1:30 PM Rogers Kussmaul, MD [address] [facility]   12/7/2018 10:00 AM STR ICR COOK CLASSROOM STRZ CAR PUL None   12/7/2018 11:00 AM STR CARDIAC EXERCISE RM STRZ CAR PUL None   12/10/2018 10:00 AM STR ICR COOK CLASSROOM STRZ CAR PUL None   12/10/2018 12:00 PM STR CARDIAC EXERCISE RM STRZ CAR PUL None   12/10/2018 1:00 PM STR CARDIAC EXERCISE RM STRZ CAR PUL None   12/12/2018 12:00 PM STR CARDIAC EXERCISE RM STRZ CAR PUL None   12/12/2018 1:00 PM STR CARDIAC EXERCISE RM STRZ CAR PUL None   12/14/2018 10:00 AM STR ICR COOK CLASSROOM STRZ CAR PUL None   12/14/2018 11:00 AM STR CARDIAC EXERCISE RM STRZ CAR PUL None   12/17/2018 10:00 AM STR ICR COOK CLASSROOM STRZ CAR PUL None   12/17/2018 12:00 PM Francisca Lundberg MD [employer] [facility]   12/17/2018 12:00 PM STR CARDIAC EXERCISE RM STRZ CAR PUL None   12/17/2018 1:00 PM STR CARDIAC EXERCISE RM STRZ CAR PUL None   12/19/2018 12:00 PM STR CARDIAC EXERCISE RM STRZ CAR PUL None   12/19/2018 1:00 PM STR CARDIAC EXERCISE RM STRZ CAR PUL None   12/21/2018 10:00 AM STR ICR COOK CLASSROOM STRZ CAR PUL None   12/21/2018 11:00 AM STR CARDIAC EXERCISE RM STRZ CAR PUL None   12/24/2018 10:00 AM STR ICR COOK CLASSROOM STRZ CAR PUL None   12/24/2018 12:00 PM STR CARDIAC EXERCISE RM STRZ CAR PUL None   12/24/2018 1:00 PM STR CARDIAC EXERCISE RM STRZ CAR PUL None   12/26/2018 12:00 PM STR CARDIAC EXERCISE RM STRZ CAR PUL None   12/26/2018 1:00 PM STR CARDIAC EXERCISE RM STRZ CAR PUL None   12/28/2018 10:00 AM STR ICR COOK CLASSROOM STRZ CAR PUL None   12/28/2018 11:00 AM STR CARDIAC EXERCISE RM STRZ CAR PUL None   12/31/2018 10:00 AM STR ICR COOK CLASSROOM STRZ CAR PUL None   12/31/2018 12:00 PM STR CARDIAC EXERCISE RM STRZ CAR PUL None   12/31/2018 1:00 PM STR CARDIAC EXERCISE RM STRZ CAR PUL None   1/2/2019 12:00 PM STR CARDIAC EXERCISE RM STRZ CAR PUL None   1/2/2019 1:00 PM STR CARDIAC EXERCISE RM STRZ CAR PUL None   1/4/2019

## 2018-11-26 NOTE — PROGRESS NOTES
Hospital Facility-Based Program  Phase 2 Cardiac Rehab Weekly Progress Report      Pt to start cardiac rehab sessions today.

## 2018-12-03 NOTE — PROGRESS NOTES
Hospital Facility-Based Program  Phase 2 Cardiac Rehab Weekly Progress Report      Patient prescribed exercise:  12:00 class. 3 times per week in rehab, 1-4 times per week at home for the amount of sessions/weeks specified by insurance. Current Levels: NuStep:  40 Aguirre for 16 minutes, UBE: 30 aguirre for 5 minutes. Progression Discussion: Maintain/Increase Aerobic exercise 20-25 minutes to work on endurance. Attempt to increase intensity by 5-20% for each modality this week. Try to increase intensities until Sebastián Rodriguez rates the exercises a 13-17 on Honey RPE.

## 2018-12-05 NOTE — PROGRESS NOTES
x 2.5 x 1.6 cm focus of subcortical hypoattenuation within the inferior aspect of the right frontal lobe. This was not present on the most recent previous examination however is located within the region of previously seen    cortical-based subarachnoid hemorrhage on the CT head examination dated 6/21/2015. There is no significant mass effect. This most likely represents a focus of subcortical encephalomalacia related to previous brain injury however MRI brain is recommended    for additional evaluation since this was not present on the most recent previous examination. 2. Stable mild to moderate white matter disease within the periventricular deep white matter and the centrum semiovale which most commonly is secondary to small vessel ischemic disease. MRI brain: 5/12/2016  I reviewed the MRI brain and agree with interpretation. IMPRESSION: INTERVAL DEVELOPMENT OF AREAS OF ENCEPHALOMALACIA IN, INCLUDING ASSOCIATED CORTICAL GLIOSIS, OF THE INFERIOR RIGHT FRONTAL LOBE, CORRESPONDING TO THE FINDING ON THE PRIOR CT, GROSSLY STABLE. FURTHERMORE, EVIDENCE OF REMOTE HEMORRHAGE IN THE    SUBDURAL SPACE OVER THE RIGHT FRONTAL LOBE CONVEXITY. COMBINATION OF FINDINGS SUSPICIOUS FOR POSTTRAUMATIC SEQUELAE. NO EVIDENCE OF ACUTE HEMORRHAGE AND NO EVIDENCE OF ABNORMAL EXTRA-AXIAL FLUID COLLECTION. Assessment:     Diagnosis Orders   1. Parkinson's disease ([address]        He reports the tremor is worse, he has been having increased tremor and slower. He is active. He was seen by Dr Crystal Hudson neurology  and told no intervention is needed for his carotid. He appears stable at exam now. He did not take the Neupro due to concern about side effects after reading list. They want to try something else. However he reports slowing own between dosages. His gait is steady uses cane, he is not at risk of falling. After detailed discussion with patient we agreed on the following plan. Plan:  1.  Change Sinemet

## 2018-12-14 NOTE — DISCHARGE SUMMARY
on 11/06/2018. FINAL DIAGNOSIS:  1. Syncope and collapse. 2.  Atherosclerotic heart disease status post stent. 3.  Right carotid vascular disease being present, greater than 70%. 4.  Parkinson's. 5.  Hyperlipidemia. CONDITION ON DISCHARGE:  Stable. DIET: AHA diet. ACTIVITY:  As tolerated. DISCHARGE MEDICATIONS:  1.  Sublingual nitro. 2.  Brilinta 90 mg one tab twice a day. 3.  Ceftin 250 mg b.i.d.  4.  Deltasone 20 mg for the next four days. 5.  Proscar 5 mg a day. 6.  Requip 0.5 mg three times a day. 7.  Prilosec 20 mg a day. 8.  Flomax 0.4 mg a day. 9.  Sinemet 25/100 three times a day. 10.  Norvasc 2.5 a day. 11.  CoQ10. Stable. No other significant_ problems otherwise present. CONDITION:  Otherwise stable. Further evaluation concerning the carotid stenosis is to be done. Did have  underlying _uti____ treated with Ceftin and other labs remain stable at  this time. We will follow back up with the patient in the next two  weeks' time. He will follow up with Dr. Kenyon Anne in one week for  cardiology schedule, and will be following up with the Interventional  Radiology concerning the right carotid stenosis area of 78%. Otherwise,  the patient is stable and no other change and released home at this  time.         Trang Tariq M.D.    D: 12/13/2018 17:23:12       T: 12/14/2018 10:10:21     [email or web address]  Job#: [id number]     Doc#: [id number]    CC:

## 2018-12-17 NOTE — PROGRESS NOTES
Hospital Facility-Based Program  Phase 2 Cardiac Rehab Weekly Progress Report      Patient prescribed exercise:  11:00 class. 3 times per week in rehab, 1-4 times per week at home for the amount of sessions/weeks specified by insurance. Current Levels: Treadmill:  Mph/ % for   minutes, Schwinn Airdyne: Level   for   minutes, NuStep:  44 Mcknight for 30 minutes, UBE: Level 30W for 5 minutes. Progression Discussion: Maintain/Increase Aerobic exercise to work on endurance. Attempt to increase intensity by 5-20% for each modality this week. Try to increase intensities until Manny Staggdomingo rates the exercises a 13-17 on Honey RPE.

## 2018-12-19 NOTE — PLAN OF CARE
to walk 3-4 days/week for 30 min on days not in rehab. Home Exercise  *Otoniel verbalizes planning to ** ** days/week for ** min on days not in rehab. Home Exercise  *Otoniel verbalizes planning to ** ** days/week for ** min on days not in rehab. Home Exercise  *Wade Luis his/her plan to ** ** days/week for ** min @ **   *Education* *Education* *Education* *Education* *Education*   RPE Scale  [x] Yes      [] No  Exercise Safety  [x] Yes      [] No  Equipment Orientation  [x] Yes      [] No  S/S to Report  [x] Yes      [] No  Warm Up/Cool Down  [x] Yes      [] No  Home Exercise  [x] Yes      [] No    All Exercise Education Completed  [] Yes      [] No   *Goals* *Goals* *Goals* *Goals* *Goals*   Initial Exercise Goals Exercise Goals  Exercise Goals   Exercise Goals  Exercise Goals   Otoniel plans to:  [x] Attend exercise sessions 3x/wk  [x] initiate/continue home exercise 2-3x/wk for 10-20 min  [x] Increase 6 min walk distance by 10%  [] ** Otoniel plans to:  [x] Attend exercise sessions 3x/wk  [x] continue home exercise 2-3x/wk for 20-30 min  [] ** Otoniel's plans to:  [x] Attend exercise sessions 3x/wk  [x] continue home exercise 3-4x/wk for 30-45 min  [x] Determine plan of exercise following rehab  [] ** Otoniel's plans to:  Stefanie Gomez achieved exercise goals? []  Yes    [] No  If no, why?  **  [] Increased 6 min walk distance by 10%  [] Currently exercising 30-60 min/day, 5-7days/wk   [] Plans to continue exercise on own  [] Plans to join a local fitness center to continue exercise  [] Does not plan to continue to exercise after rehab   Return to ADL or Hobbies:  Jaelyn Son would like to improve strength and endurance so he is able to return to his normal routine at the [employer] [facility]. Wants to get back to exercise class. Wants to start golf again.    Return to ADL or Hobbies:  Jaelyn Son would like to improve strength and endurance so he is able to return to exercise class and golf Return to ADL or Hobbies:  Jaelyn Son would like to goals are as follows:  [x] Attend Nutrition Workshops  [x] Attend 1:1   [x] Attend Cooking Classes  [] ** Otoniel's nutritional goals are as follows:  [] Attend Nutrition Workshops  [] Attend 1:1   [] Attend Cooking Classes  [] Complete and return diet survey  [] ** Otoniel's nutritional goals are as follows:  [] Attend Nutrition Workshops  [] Attend 1:1   [] Attend Cooking Classes  [] ** Sarah Jhon achieved nutritional goals   [] Yes    [] No  If no, why? Use knowledge gained to continue Pritikin eating plan at home       Individual Cardiac Treatment Plan - Psychosocial  PSYCHOSOCIAL  ASSESSMENT/PLAN PSYCHOSOCIAL  REASSESSMENT PSYCHOSOCIAL   REASSESSMENT PSYCHOSOCIAL   REASSESSMENT PSYCHOSOCIAL  DISCHARGE/FOLLOW-UP   Stages of Change Stages of Change Stages of Change Stages of Change Stages of Change   [] Pre Contemplation  [] Contemplation  [] Preparation  [x] Action  [] Maintenance  [] Relapse [] Pre Contemplation  [] Contemplation  [] Preparation  [] Action  [x] Maintenance  [] Relapse [] Pre Contemplation  [] Contemplation  [] Preparation  [] Action  [] Maintenance  [] Relapse [] Pre Contemplation  [] Contemplation  [] Preparation  [] Action  [] Maintenance  [] Relapse [] Pre Contemplation  [] Contemplation  [] Preparation  [] Action  [] Maintenance  [] Relapse   PSYCHOSOCIAL ASSESSMENT PSYCHOSOCIAL ASSESSMENT PSYCHOSOCIAL ASSESSMENT PSYCHOSOCIAL ASSESSMENT PSYCHOSOCIAL ASSESSMENT   Behavioral Outcomes Behavioral Outcomes Behavioral Outcomes Behavioral Outcomes Behavioral Outcomes   Tool Used:  Devin & Joaquin, Quality of Life Index, Cardiac Version IV  *Given to patient to complete.  Tool Used:    Devin & Joaquin, Quality of Life Index, Cardiac Version IV     QOL Index Score: 23.25  HF:21.7  S&E:25.93  P&S: 35.43  Family: 21.1   Tool Used:     Devin & Joaquin, Quality of Life Index, Cardiac Version IV    QOL Index Score: **  HF:**  S&E:**  P&S: **  Family: **   PHQ-9 score 4  Depression Severity  []Minimal  [x]Mild

## 2018-12-20 NOTE — PROGRESS NOTES
[facility] was seen in follow up for cystoscopy as per plan developed by Eligio Roblero NP. Cystoscopy was performed without difficulty and it was well tolerated.     Past Medical History:   Diagnosis Date    Ankle fracture 2/2016    left in cast    Arthritis     CAD (coronary artery disease)     margarette    Cancer ([address]     right cheek-basal cell,neck,bladder    Cervical vertebra fusion     Chronic kidney disease     Colon polyps 2010    abrahan    GERD (gastroesophageal reflux disease)     Hyperlipemia     Melanoma ([address]     Neck, Scalp    Other disorders of kidney and ureter in diseases classified elsewhere     Parkinson disease ([address] 2016    Parkinson disease ([address]     Sleep apnea     cpap    Spinal stenosis     Subdural hematoma ([address] 2015       resolved     Syncope        Past Surgical History:   Procedure Laterality Date    BACK SURGERY      BLADDER SURGERY  5/31/11    BLADDER TUMOR EXCISION  2011    BLEPHAROPLASTY  2007, 2008    CARDIAC CATHETERIZATION  2009    moderate    margarette    CARDIAC CATHETERIZATION  11/2018    CATARACT REMOVAL Bilateral 1995    CERVICAL FUSION  2009    COLONOSCOPY  9-24-10      abrahan  wn    CORONARY ANGIOPLASTY WITH STENT PLACEMENT  11/2018     [facility] eluting stent dr Catarino Obrien  .6/03/2015    CYSTOSCOPY  08/2018    bladder tumor    [place]    DIAGNOSTIC CARDIAC CATH LAB PROCEDURE      EYE SURGERY      HEMORRHOID SURGERY  1994    [place] CYSTOURETHROSCOPY,FULGUR .5-2CM LESN N/A 8/3/2018    CYSTOSCOPY TRANSURETHRAL RESECTION BLADDER TUMOR, SMALL performed by Nicole Lazo MD at [address] [place] [address]      left nicole Marcos      neck and scalp melanoma, cheek basal cell    SKIN CANCER EXCISION      left nicole - Dr Azeem Zamora         Current Outpatient Prescriptions on File Prior to Visit   Medication Sig Dispense Refill    folic acid (FOLVITE) 401 MCG tablet Take 400 mcg by mouth daily      Spouse name: N/A    Number of children: N/A    Years of education: N/A     Occupational History    Not on file. Social History Main Topics    Smoking status: Former Smoker     Quit date: 9/19/1978    Smokeless tobacco: Never Used    Alcohol use No      Comment: rare     Drug use: No    Sexual activity: Not on file     Other Topics Concern    Not on file     Social History Narrative    No narrative on file       Review of Systems  No problems with ears, nose or throat. No problems with eyes. No chest pain, shortness of breath, abdominal pain, extremity pain or weakness, and no neurological deficits. No rashes. No swollen glands or lymph nodes.  symptoms per HPI. The remainder of the review of symptoms is negative. Exam  General: alert and oriented. Cooperative. HENT: Normocephalic, Atraumatic. Eyes: No scleral icterus, mucous membranes moist.  Respiratory: Effort normal.   Skin: No rashes or obvious lesions. Labs    No results found for this visit on 12/20/18. Lab Results   Component Value Date    CREATININE 0.8 11/05/2018    BUN 16 11/05/2018     11/05/2018    K 4.0 11/05/2018     11/05/2018    CO2 27 11/05/2018       Cystoscopy  After obtaining informed consent and prepping the urethral meatus, a 16-Kyrgyz flexible cystoscope was passed per urethra into the bladder. The urethra was evaluated on the way in and then again on the way out and was found to be hyper vascular and possible urethritis. The prostate was very significantly obstructing. The bladder was evaluated in its endoscopic entirety and found to have trabeculations and bladder diverticulum  There were no tumors, stones, ulcers or foreign bodies. There were no mucosal abnormalities. The ureteral orifices were seen and were normal.  The scope was removed. The patient tolerated the procedure and there were no complications.  A dose of 500 mg ciprofloxacin was given for the procedure.     Impression:     Given 4 additional cipro today due to possible urethritis  Trabeculations  Diverticulum  Scars from previous resection looks healthy  Significantly obstructing prostate  Urethra hypervascular      Plan:    Send for bladder CX testing  Continue Vesicare

## 2018-12-21 NOTE — PROGRESS NOTES
Shi COLÓN.:  1931  Acct Number: [de-identified]  MRN:  [id number]                      [facility] [employer] WORKSHOP             Date: 2018        Session # ________     Noah Carey class covered:      () Adding Flavor     () Fast Breakfasts     () Salads and Dressings     (X) Soups and Sauces     () Simple Sides     () Appetizers and Snacks     () Delicious Desserts     () Plant Based Proteins     () Fast Evening Meals     () Weekend Breakfasts     () Efficiency Cooking     () Meat Alternatives     Patients were shown how to choose, prep, and cook; substitutions and other options were given. Samples were offered. Recipes were given and questions answered. The patient above was in the [employer] for 45 minutes.       Electronically signed by Fabian Velasquez RD, LD on 2018 at 11:45 AM

## 2019-01-01 ENCOUNTER — HOSPITAL ENCOUNTER (OUTPATIENT)
Dept: CARDIAC REHAB | Age: 84
Setting detail: THERAPIES SERIES
Discharge: HOME | End: 2019-03-01
Payer: MEDICARE

## 2019-01-01 ENCOUNTER — HOSPITAL ENCOUNTER (OUTPATIENT)
Dept: CARDIAC REHAB | Age: 84
Setting detail: THERAPIES SERIES
Discharge: HOME | End: 2019-03-08
Payer: MEDICARE

## 2019-01-01 ENCOUNTER — HOSPITAL ENCOUNTER (OUTPATIENT)
Dept: CARDIAC REHAB | Age: 84
Setting detail: THERAPIES SERIES
Discharge: HOME | End: 2019-01-09
Payer: MEDICARE

## 2019-01-01 ENCOUNTER — HOSPITAL ENCOUNTER (OUTPATIENT)
Dept: CARDIAC REHAB | Age: 84
Setting detail: THERAPIES SERIES
Discharge: HOME | End: 2019-01-23
Payer: MEDICARE

## 2019-01-01 ENCOUNTER — HOSPITAL ENCOUNTER (OUTPATIENT)
Dept: CARDIAC REHAB | Age: 84
Setting detail: THERAPIES SERIES
Discharge: HOME | End: 2019-02-15
Payer: MEDICARE

## 2019-01-01 ENCOUNTER — APPOINTMENT (OUTPATIENT)
Dept: CARDIAC REHAB | Age: 84
End: 2019-01-01
Payer: MEDICARE

## 2019-01-01 ENCOUNTER — HOSPITAL ENCOUNTER (OUTPATIENT)
Dept: CARDIAC REHAB | Age: 84
Setting detail: THERAPIES SERIES
Discharge: HOME | End: 2019-02-11
Payer: MEDICARE

## 2019-01-01 ENCOUNTER — HOSPITAL ENCOUNTER (OUTPATIENT)
Dept: CARDIAC REHAB | Age: 84
Setting detail: THERAPIES SERIES
Discharge: HOME | End: 2019-02-06
Payer: MEDICARE

## 2019-01-01 ENCOUNTER — APPOINTMENT (OUTPATIENT)
Dept: GENERAL RADIOLOGY | Age: 84
DRG: 949 | End: 2019-01-01
Attending: PHYSICAL MEDICINE & REHABILITATION
Payer: MEDICARE

## 2019-01-01 ENCOUNTER — APPOINTMENT (OUTPATIENT)
Dept: CT IMAGING | Age: 84
DRG: 025 | End: 2019-01-01
Payer: MEDICARE

## 2019-01-01 ENCOUNTER — APPOINTMENT (OUTPATIENT)
Dept: GENERAL RADIOLOGY | Age: 84
DRG: 085 | End: 2019-01-01
Attending: FAMILY MEDICINE
Payer: MEDICARE

## 2019-01-01 ENCOUNTER — APPOINTMENT (OUTPATIENT)
Dept: GENERAL RADIOLOGY | Age: 84
End: 2019-01-01
Payer: MEDICARE

## 2019-01-01 ENCOUNTER — HOSPITAL ENCOUNTER (OUTPATIENT)
Dept: CARDIAC REHAB | Age: 84
Setting detail: THERAPIES SERIES
Discharge: HOME | End: 2019-02-27
Payer: MEDICARE

## 2019-01-01 ENCOUNTER — APPOINTMENT (OUTPATIENT)
Dept: CT IMAGING | Age: 84
DRG: 085 | End: 2019-01-01
Attending: FAMILY MEDICINE
Payer: MEDICARE

## 2019-01-01 ENCOUNTER — HOSPITAL ENCOUNTER (OUTPATIENT)
Dept: CARDIAC REHAB | Age: 84
Setting detail: THERAPIES SERIES
Discharge: HOME | End: 2019-01-18
Payer: MEDICARE

## 2019-01-01 ENCOUNTER — HOSPITAL ENCOUNTER (OUTPATIENT)
Dept: CARDIAC REHAB | Age: 84
Setting detail: THERAPIES SERIES
Discharge: HOME | End: 2019-03-13
Payer: MEDICARE

## 2019-01-01 ENCOUNTER — HOSPITAL ENCOUNTER (OUTPATIENT)
Dept: CARDIAC REHAB | Age: 84
Setting detail: THERAPIES SERIES
Discharge: HOME | End: 2019-01-02
Payer: MEDICARE

## 2019-01-01 ENCOUNTER — HOSPITAL ENCOUNTER (OUTPATIENT)
Dept: CARDIAC REHAB | Age: 84
Setting detail: THERAPIES SERIES
Discharge: HOME | End: 2019-02-25
Payer: MEDICARE

## 2019-01-01 ENCOUNTER — HOSPITAL ENCOUNTER (OUTPATIENT)
Dept: CARDIAC REHAB | Age: 84
Setting detail: THERAPIES SERIES
Discharge: HOME | End: 2019-02-13
Payer: MEDICARE

## 2019-01-01 ENCOUNTER — HOSPITAL ENCOUNTER (OUTPATIENT)
Dept: CARDIAC REHAB | Age: 84
Setting detail: THERAPIES SERIES
Discharge: HOME | End: 2019-02-18
Payer: MEDICARE

## 2019-01-01 ENCOUNTER — HOSPITAL ENCOUNTER (OUTPATIENT)
Dept: NON INVASIVE DIAGNOSTICS | Age: 84
Discharge: HOME | End: 2019-01-22
Payer: MEDICARE

## 2019-01-01 ENCOUNTER — HOSPITAL ENCOUNTER (OUTPATIENT)
Dept: CARDIAC REHAB | Age: 84
Setting detail: THERAPIES SERIES
Discharge: HOME | End: 2019-02-04
Payer: MEDICARE

## 2019-01-01 ENCOUNTER — HOSPITAL ENCOUNTER (OUTPATIENT)
Dept: CARDIAC REHAB | Age: 84
Setting detail: THERAPIES SERIES
Discharge: HOME | End: 2019-01-21
Payer: MEDICARE

## 2019-01-01 ENCOUNTER — HOSPITAL ENCOUNTER (OUTPATIENT)
Dept: CARDIAC REHAB | Age: 84
Setting detail: THERAPIES SERIES
Discharge: HOME | End: 2019-02-08
Payer: MEDICARE

## 2019-01-01 ENCOUNTER — HOSPITAL ENCOUNTER (INPATIENT)
Age: 84
LOS: 6 days | Discharge: HOSPICE | DRG: 085 | End: 2019-04-05
Attending: FAMILY MEDICINE | Admitting: FAMILY MEDICINE
Payer: MEDICARE

## 2019-01-01 ENCOUNTER — HOSPITAL ENCOUNTER (OUTPATIENT)
Dept: CARDIAC REHAB | Age: 84
Setting detail: THERAPIES SERIES
Discharge: HOME | End: 2019-01-07
Payer: MEDICARE

## 2019-01-01 ENCOUNTER — HOSPITAL ENCOUNTER (OUTPATIENT)
Dept: CARDIAC REHAB | Age: 84
Setting detail: THERAPIES SERIES
Discharge: HOME | End: 2019-01-11
Payer: MEDICARE

## 2019-01-01 ENCOUNTER — HOSPITAL ENCOUNTER (INPATIENT)
Age: 84
LOS: 8 days | Discharge: NURSING, REHAB OR LONG-TERM CARE | DRG: 025 | End: 2019-03-25
Attending: EMERGENCY MEDICINE | Admitting: SURGERY
Payer: MEDICARE

## 2019-01-01 ENCOUNTER — HOSPITAL ENCOUNTER (OUTPATIENT)
Dept: CARDIAC REHAB | Age: 84
Setting detail: THERAPIES SERIES
Discharge: HOME | End: 2019-03-11
Payer: MEDICARE

## 2019-01-01 ENCOUNTER — HOSPITAL ENCOUNTER (OUTPATIENT)
Dept: CARDIAC REHAB | Age: 84
Setting detail: THERAPIES SERIES
Discharge: HOME | End: 2019-01-25
Payer: MEDICARE

## 2019-01-01 ENCOUNTER — HOSPITAL ENCOUNTER (OUTPATIENT)
Dept: CARDIAC REHAB | Age: 84
Setting detail: THERAPIES SERIES
End: 2019-01-01
Payer: MEDICARE

## 2019-01-01 ENCOUNTER — HOSPITAL ENCOUNTER (OUTPATIENT)
Dept: CARDIAC REHAB | Age: 84
Setting detail: THERAPIES SERIES
Discharge: HOME | End: 2019-01-28
Payer: MEDICARE

## 2019-01-01 ENCOUNTER — NURSE ONLY (OUTPATIENT)
Dept: UROLOGY | Age: 84
End: 2019-01-01

## 2019-01-01 ENCOUNTER — APPOINTMENT (OUTPATIENT)
Dept: CT IMAGING | Age: 84
End: 2019-01-01
Payer: MEDICARE

## 2019-01-01 ENCOUNTER — HOSPITAL ENCOUNTER (EMERGENCY)
Age: 84
Discharge: HOME | End: 2019-01-14
Attending: EMERGENCY MEDICINE
Payer: MEDICARE

## 2019-01-01 ENCOUNTER — HOSPITAL ENCOUNTER (OUTPATIENT)
Dept: CARDIAC REHAB | Age: 84
Setting detail: THERAPIES SERIES
Discharge: HOME | End: 2019-03-04
Payer: MEDICARE

## 2019-01-01 ENCOUNTER — HOSPITAL ENCOUNTER (INPATIENT)
Age: 84
LOS: 5 days | Discharge: ANOTHER HOSPITAL | DRG: 949 | End: 2019-03-30
Attending: PHYSICAL MEDICINE & REHABILITATION | Admitting: PHYSICAL MEDICINE & REHABILITATION
Payer: MEDICARE

## 2019-01-01 ENCOUNTER — HOSPITAL ENCOUNTER (OUTPATIENT)
Dept: CARDIAC REHAB | Age: 84
Setting detail: THERAPIES SERIES
Discharge: HOME | End: 2019-01-14
Payer: MEDICARE

## 2019-01-01 ENCOUNTER — CARE COORDINATION (OUTPATIENT)
Dept: CASE MANAGEMENT | Age: 84
End: 2019-01-01

## 2019-01-01 ENCOUNTER — HOSPITAL ENCOUNTER (OUTPATIENT)
Dept: CARDIAC REHAB | Age: 84
Setting detail: THERAPIES SERIES
Discharge: HOME | End: 2019-01-04
Payer: MEDICARE

## 2019-01-01 ENCOUNTER — ANESTHESIA EVENT (OUTPATIENT)
Dept: OPERATING ROOM | Age: 84
DRG: 025 | End: 2019-01-01
Payer: MEDICARE

## 2019-01-01 ENCOUNTER — ANESTHESIA (OUTPATIENT)
Dept: OPERATING ROOM | Age: 84
DRG: 025 | End: 2019-01-01
Payer: MEDICARE

## 2019-01-01 ENCOUNTER — HOSPITAL ENCOUNTER (OUTPATIENT)
Dept: CARDIAC REHAB | Age: 84
Setting detail: THERAPIES SERIES
Discharge: HOME | End: 2019-02-22
Payer: MEDICARE

## 2019-01-01 ENCOUNTER — APPOINTMENT (OUTPATIENT)
Dept: CT IMAGING | Age: 84
DRG: 949 | End: 2019-01-01
Attending: PHYSICAL MEDICINE & REHABILITATION
Payer: MEDICARE

## 2019-01-01 ENCOUNTER — OFFICE VISIT (OUTPATIENT)
Dept: FAMILY MEDICINE CLINIC | Age: 84
End: 2019-01-01

## 2019-01-01 ENCOUNTER — APPOINTMENT (OUTPATIENT)
Dept: GENERAL RADIOLOGY | Age: 84
DRG: 025 | End: 2019-01-01
Payer: MEDICARE

## 2019-01-01 ENCOUNTER — HOSPITAL ENCOUNTER (OUTPATIENT)
Dept: CARDIAC REHAB | Age: 84
Setting detail: THERAPIES SERIES
Discharge: HOME | End: 2019-03-06
Payer: MEDICARE

## 2019-01-01 VITALS
HEIGHT: 70 IN | BODY MASS INDEX: 17.67 KG/M2 | OXYGEN SATURATION: 94 % | WEIGHT: 123.46 LBS | HEART RATE: 66 BPM | RESPIRATION RATE: 16 BRPM | SYSTOLIC BLOOD PRESSURE: 138 MMHG | DIASTOLIC BLOOD PRESSURE: 68 MMHG | TEMPERATURE: 97.6 F

## 2019-01-01 VITALS
BODY MASS INDEX: 17.39 KG/M2 | HEIGHT: 70 IN | TEMPERATURE: 97.1 F | OXYGEN SATURATION: 95 % | DIASTOLIC BLOOD PRESSURE: 68 MMHG | HEART RATE: 86 BPM | SYSTOLIC BLOOD PRESSURE: 134 MMHG | RESPIRATION RATE: 18 BRPM | WEIGHT: 121.5 LBS

## 2019-01-01 VITALS
RESPIRATION RATE: 12 BRPM | BODY MASS INDEX: 21.94 KG/M2 | HEIGHT: 70 IN | WEIGHT: 153.25 LBS | SYSTOLIC BLOOD PRESSURE: 100 MMHG | HEART RATE: 56 BPM | DIASTOLIC BLOOD PRESSURE: 60 MMHG

## 2019-01-01 VITALS
SYSTOLIC BLOOD PRESSURE: 104 MMHG | HEIGHT: 69 IN | HEART RATE: 56 BPM | DIASTOLIC BLOOD PRESSURE: 62 MMHG | WEIGHT: 150 LBS | BODY MASS INDEX: 22.22 KG/M2 | RESPIRATION RATE: 10 BRPM

## 2019-01-01 VITALS
WEIGHT: 154 LBS | SYSTOLIC BLOOD PRESSURE: 166 MMHG | OXYGEN SATURATION: 98 % | TEMPERATURE: 97.7 F | BODY MASS INDEX: 22.05 KG/M2 | DIASTOLIC BLOOD PRESSURE: 84 MMHG | HEART RATE: 89 BPM | RESPIRATION RATE: 15 BRPM | HEIGHT: 70 IN

## 2019-01-01 VITALS
HEIGHT: 70 IN | WEIGHT: 143.2 LBS | DIASTOLIC BLOOD PRESSURE: 67 MMHG | TEMPERATURE: 98.3 F | HEART RATE: 58 BPM | SYSTOLIC BLOOD PRESSURE: 146 MMHG | RESPIRATION RATE: 20 BRPM | OXYGEN SATURATION: 96 % | BODY MASS INDEX: 20.5 KG/M2

## 2019-01-01 VITALS
TEMPERATURE: 100.6 F | SYSTOLIC BLOOD PRESSURE: 109 MMHG | OXYGEN SATURATION: 98 % | DIASTOLIC BLOOD PRESSURE: 55 MMHG | RESPIRATION RATE: 1 BRPM

## 2019-01-01 VITALS — BODY MASS INDEX: 22.22 KG/M2 | HEIGHT: 69 IN | WEIGHT: 150 LBS

## 2019-01-01 DIAGNOSIS — N31.9 NEUROGENIC BLADDER: ICD-10-CM

## 2019-01-01 DIAGNOSIS — S06.5XAA SUBDURAL HEMATOMA: Primary | ICD-10-CM

## 2019-01-01 DIAGNOSIS — M48.061 SPINAL STENOSIS OF LUMBAR REGION, UNSPECIFIED WHETHER NEUROGENIC CLAUDICATION PRESENT: ICD-10-CM

## 2019-01-01 DIAGNOSIS — R55 SYNCOPE AND COLLAPSE: Primary | ICD-10-CM

## 2019-01-01 DIAGNOSIS — I25.10 CORONARY ARTERY DISEASE INVOLVING NATIVE CORONARY ARTERY OF NATIVE HEART WITHOUT ANGINA PECTORIS: ICD-10-CM

## 2019-01-01 DIAGNOSIS — G20 PARKINSON DISEASE (HCC): ICD-10-CM

## 2019-01-01 DIAGNOSIS — G20 PARKINSON DISEASE (HCC): Primary | ICD-10-CM

## 2019-01-01 DIAGNOSIS — I62.00 SUBDURAL BLEEDING (HCC): ICD-10-CM

## 2019-01-01 DIAGNOSIS — G20 PARKINSON'S DISEASE (HCC): Primary | ICD-10-CM

## 2019-01-01 DIAGNOSIS — S01.81XA FACIAL LACERATION, INITIAL ENCOUNTER: ICD-10-CM

## 2019-01-01 DIAGNOSIS — R55 SYNCOPE, UNSPECIFIED SYNCOPE TYPE: Primary | ICD-10-CM

## 2019-01-01 DIAGNOSIS — E78.00 PURE HYPERCHOLESTEROLEMIA: ICD-10-CM

## 2019-01-01 DIAGNOSIS — N40.0 BENIGN PROSTATIC HYPERPLASIA WITHOUT LOWER URINARY TRACT SYMPTOMS: ICD-10-CM

## 2019-01-01 DIAGNOSIS — C67.8 MALIGNANT NEOPLASM OF OVERLAPPING SITES OF BLADDER (HCC): ICD-10-CM

## 2019-01-01 LAB
ABO: NORMAL
ALBUMIN SERPL-MCNC: 2.8 G/DL (ref 3.5–5.1)
ALBUMIN SERPL-MCNC: 3.7 G/DL (ref 3.5–5.1)
ALBUMIN SERPL-MCNC: 3.9 G/DL (ref 3.5–5.1)
ALLEN TEST: ABNORMAL
ALP BLD-CCNC: 104 U/L (ref 38–126)
ALP BLD-CCNC: 106 U/L (ref 38–126)
ALP BLD-CCNC: 136 U/L (ref 38–126)
ALT SERPL-CCNC: 13 U/L (ref 11–66)
ALT SERPL-CCNC: 7 U/L (ref 11–66)
ALT SERPL-CCNC: 9 U/L (ref 11–66)
AMMONIA: 34 UMOL/L (ref 11–60)
ANGLE TEG: 70.3 DEG (ref 53–72)
ANION GAP SERPL CALCULATED.3IONS-SCNC: 10 MEQ/L (ref 8–16)
ANION GAP SERPL CALCULATED.3IONS-SCNC: 11 MEQ/L (ref 8–16)
ANION GAP SERPL CALCULATED.3IONS-SCNC: 12 MEQ/L (ref 8–16)
ANION GAP SERPL CALCULATED.3IONS-SCNC: 13 MEQ/L (ref 8–16)
ANION GAP SERPL CALCULATED.3IONS-SCNC: 13 MEQ/L (ref 8–16)
ANION GAP SERPL CALCULATED.3IONS-SCNC: 14 MEQ/L (ref 8–16)
ANION GAP SERPL CALCULATED.3IONS-SCNC: 15 MEQ/L (ref 8–16)
ANION GAP SERPL CALCULATED.3IONS-SCNC: 8 MEQ/L (ref 8–16)
ANION GAP SERPL CALCULATED.3IONS-SCNC: 9 MEQ/L (ref 8–16)
ANION GAP SERPL CALCULATED.3IONS-SCNC: 9 MEQ/L (ref 8–16)
ANTIBODY SCREEN: NORMAL
APTT: 30.1 SECONDS (ref 22–38)
APTT: 30.4 SECONDS (ref 22–38)
AST SERPL-CCNC: 18 U/L (ref 5–40)
AST SERPL-CCNC: 19 U/L (ref 5–40)
AST SERPL-CCNC: 26 U/L (ref 5–40)
BACTERIA: ABNORMAL /HPF
BASE EXCESS (CALCULATED): -0.4 MMOL/L (ref -2.5–2.5)
BASOPHILS # BLD: 0.4 %
BASOPHILS # BLD: 0.5 %
BASOPHILS # BLD: 0.5 %
BASOPHILS # BLD: 0.6 %
BASOPHILS ABSOLUTE: 0 THOU/MM3 (ref 0–0.1)
BASOPHILS ABSOLUTE: 0 THOU/MM3 (ref 0–0.1)
BASOPHILS ABSOLUTE: 0.1 THOU/MM3 (ref 0–0.1)
BASOPHILS ABSOLUTE: 0.1 THOU/MM3 (ref 0–0.1)
BILIRUB SERPL-MCNC: 0.4 MG/DL (ref 0.3–1.2)
BILIRUB SERPL-MCNC: 0.6 MG/DL (ref 0.3–1.2)
BILIRUB SERPL-MCNC: 0.8 MG/DL (ref 0.3–1.2)
BILIRUBIN DIRECT: < 0.2 MG/DL (ref 0–0.3)
BILIRUBIN DIRECT: < 0.2 MG/DL (ref 0–0.3)
BILIRUBIN URINE: NEGATIVE
BILIRUBIN URINE: NEGATIVE
BLOOD CULTURE, ROUTINE: NORMAL
BLOOD, URINE: ABNORMAL
BLOOD, URINE: NEGATIVE
BUN BLDV-MCNC: 10 MG/DL (ref 7–22)
BUN BLDV-MCNC: 15 MG/DL (ref 7–22)
BUN BLDV-MCNC: 16 MG/DL (ref 7–22)
BUN BLDV-MCNC: 16 MG/DL (ref 7–22)
BUN BLDV-MCNC: 17 MG/DL (ref 7–22)
BUN BLDV-MCNC: 17 MG/DL (ref 7–22)
BUN BLDV-MCNC: 18 MG/DL (ref 7–22)
BUN BLDV-MCNC: 19 MG/DL (ref 7–22)
BUN BLDV-MCNC: 19 MG/DL (ref 7–22)
CALCIUM IONIZED: 0.94 MMOL/L (ref 1.12–1.32)
CALCIUM IONIZED: 0.96 MMOL/L (ref 1.12–1.32)
CALCIUM IONIZED: 0.98 MMOL/L (ref 1.12–1.32)
CALCIUM IONIZED: 1 MMOL/L (ref 1.12–1.32)
CALCIUM IONIZED: 1.03 MMOL/L (ref 1.12–1.32)
CALCIUM IONIZED: 1.12 MMOL/L (ref 1.12–1.32)
CALCIUM IONIZED: 1.18 MMOL/L (ref 1.12–1.32)
CALCIUM IONIZED: 1.21 MMOL/L (ref 1.12–1.32)
CALCIUM SERPL-MCNC: 7.7 MG/DL (ref 8.5–10.5)
CALCIUM SERPL-MCNC: 7.9 MG/DL (ref 8.5–10.5)
CALCIUM SERPL-MCNC: 8.1 MG/DL (ref 8.5–10.5)
CALCIUM SERPL-MCNC: 8.2 MG/DL (ref 8.5–10.5)
CALCIUM SERPL-MCNC: 8.2 MG/DL (ref 8.5–10.5)
CALCIUM SERPL-MCNC: 8.3 MG/DL (ref 8.5–10.5)
CALCIUM SERPL-MCNC: 8.4 MG/DL (ref 8.5–10.5)
CALCIUM SERPL-MCNC: 8.5 MG/DL (ref 8.5–10.5)
CALCIUM SERPL-MCNC: 8.7 MG/DL (ref 8.5–10.5)
CALCIUM SERPL-MCNC: 8.8 MG/DL (ref 8.5–10.5)
CALCIUM SERPL-MCNC: 8.8 MG/DL (ref 8.5–10.5)
CALCIUM SERPL-MCNC: 8.9 MG/DL (ref 8.5–10.5)
CALCIUM SERPL-MCNC: 9.2 MG/DL (ref 8.5–10.5)
CASTS 2: ABNORMAL /LPF
CASTS UA: ABNORMAL /LPF
CHARACTER, URINE: ABNORMAL
CHARACTER, URINE: CLEAR
CHLORIDE BLD-SCNC: 101 MEQ/L (ref 98–111)
CHLORIDE BLD-SCNC: 102 MEQ/L (ref 98–111)
CHLORIDE BLD-SCNC: 102 MEQ/L (ref 98–111)
CHLORIDE BLD-SCNC: 103 MEQ/L (ref 98–111)
CHLORIDE BLD-SCNC: 104 MEQ/L (ref 98–111)
CHLORIDE BLD-SCNC: 104 MEQ/L (ref 98–111)
CHLORIDE BLD-SCNC: 105 MEQ/L (ref 98–111)
CHLORIDE BLD-SCNC: 106 MEQ/L (ref 98–111)
CHLORIDE BLD-SCNC: 107 MEQ/L (ref 98–111)
CHLORIDE BLD-SCNC: 107 MEQ/L (ref 98–111)
CHLORIDE BLD-SCNC: 112 MEQ/L (ref 98–111)
CHLORIDE BLD-SCNC: 97 MEQ/L (ref 98–111)
CHLORIDE BLD-SCNC: 98 MEQ/L (ref 98–111)
CO2: 19 MEQ/L (ref 23–33)
CO2: 20 MEQ/L (ref 23–33)
CO2: 21 MEQ/L (ref 23–33)
CO2: 22 MEQ/L (ref 23–33)
CO2: 23 MEQ/L (ref 23–33)
CO2: 24 MEQ/L (ref 23–33)
CO2: 25 MEQ/L (ref 23–33)
CO2: 25 MEQ/L (ref 23–33)
CO2: 26 MEQ/L (ref 23–33)
CO2: 26 MEQ/L (ref 23–33)
CO2: 27 MEQ/L (ref 23–33)
CO2: 30 MEQ/L (ref 23–33)
CO2: 31 MEQ/L (ref 23–33)
COLLECTED BY:: ABNORMAL
COLOR: YELLOW
COLOR: YELLOW
CREAT SERPL-MCNC: 0.4 MG/DL (ref 0.4–1.2)
CREAT SERPL-MCNC: 0.4 MG/DL (ref 0.4–1.2)
CREAT SERPL-MCNC: 0.5 MG/DL (ref 0.4–1.2)
CREAT SERPL-MCNC: 0.6 MG/DL (ref 0.4–1.2)
CREAT SERPL-MCNC: 0.7 MG/DL (ref 0.4–1.2)
CREAT SERPL-MCNC: 0.7 MG/DL (ref 0.4–1.2)
CRYSTALS, UA: ABNORMAL
DEVICE: ABNORMAL
EKG ATRIAL RATE: 60 BPM
EKG ATRIAL RATE: 68 BPM
EKG P AXIS: 56 DEGREES
EKG P AXIS: 97 DEGREES
EKG P-R INTERVAL: 176 MS
EKG P-R INTERVAL: 178 MS
EKG Q-T INTERVAL: 460 MS
EKG Q-T INTERVAL: 492 MS
EKG QRS DURATION: 140 MS
EKG QRS DURATION: 146 MS
EKG QTC CALCULATION (BAZETT): 489 MS
EKG QTC CALCULATION (BAZETT): 492 MS
EKG R AXIS: -54 DEGREES
EKG R AXIS: -60 DEGREES
EKG T AXIS: 4 DEGREES
EKG T AXIS: 6 DEGREES
EKG VENTRICULAR RATE: 60 BPM
EKG VENTRICULAR RATE: 68 BPM
EOSINOPHIL # BLD: 0.8 %
EOSINOPHIL # BLD: 1.4 %
EOSINOPHIL # BLD: 1.8 %
EOSINOPHIL # BLD: 1.9 %
EOSINOPHILS ABSOLUTE: 0.1 THOU/MM3 (ref 0–0.4)
EOSINOPHILS ABSOLUTE: 0.2 THOU/MM3 (ref 0–0.4)
EPITHELIAL CELLS, UA: ABNORMAL /HPF
EPL-TEG: 1.4 %
ERYTHROCYTE [DISTWIDTH] IN BLOOD BY AUTOMATED COUNT: 13.2 % (ref 11.5–14.5)
ERYTHROCYTE [DISTWIDTH] IN BLOOD BY AUTOMATED COUNT: 13.6 % (ref 11.5–14.5)
ERYTHROCYTE [DISTWIDTH] IN BLOOD BY AUTOMATED COUNT: 13.6 % (ref 11.5–14.5)
ERYTHROCYTE [DISTWIDTH] IN BLOOD BY AUTOMATED COUNT: 13.7 % (ref 11.5–14.5)
ERYTHROCYTE [DISTWIDTH] IN BLOOD BY AUTOMATED COUNT: 13.8 % (ref 11.5–14.5)
ERYTHROCYTE [DISTWIDTH] IN BLOOD BY AUTOMATED COUNT: 13.9 % (ref 11.5–14.5)
ERYTHROCYTE [DISTWIDTH] IN BLOOD BY AUTOMATED COUNT: 46.2 FL (ref 35–45)
ERYTHROCYTE [DISTWIDTH] IN BLOOD BY AUTOMATED COUNT: 47 FL (ref 35–45)
ERYTHROCYTE [DISTWIDTH] IN BLOOD BY AUTOMATED COUNT: 47.2 FL (ref 35–45)
ERYTHROCYTE [DISTWIDTH] IN BLOOD BY AUTOMATED COUNT: 47.6 FL (ref 35–45)
ERYTHROCYTE [DISTWIDTH] IN BLOOD BY AUTOMATED COUNT: 48.5 FL (ref 35–45)
ERYTHROCYTE [DISTWIDTH] IN BLOOD BY AUTOMATED COUNT: 49.6 FL (ref 35–45)
FLU A ANTIGEN: NEGATIVE
FLU B ANTIGEN: NEGATIVE
GFR SERPL CREATININE-BSD FRML MDRD: > 90 ML/MIN/1.73M2
GLUCOSE BLD-MCNC: 101 MG/DL (ref 70–108)
GLUCOSE BLD-MCNC: 103 MG/DL (ref 70–108)
GLUCOSE BLD-MCNC: 104 MG/DL (ref 70–108)
GLUCOSE BLD-MCNC: 105 MG/DL (ref 70–108)
GLUCOSE BLD-MCNC: 107 MG/DL (ref 70–108)
GLUCOSE BLD-MCNC: 108 MG/DL (ref 70–108)
GLUCOSE BLD-MCNC: 109 MG/DL (ref 70–108)
GLUCOSE BLD-MCNC: 110 MG/DL (ref 70–108)
GLUCOSE BLD-MCNC: 111 MG/DL (ref 70–108)
GLUCOSE BLD-MCNC: 112 MG/DL (ref 70–108)
GLUCOSE BLD-MCNC: 113 MG/DL (ref 70–108)
GLUCOSE BLD-MCNC: 113 MG/DL (ref 70–108)
GLUCOSE BLD-MCNC: 114 MG/DL (ref 70–108)
GLUCOSE BLD-MCNC: 115 MG/DL (ref 70–108)
GLUCOSE BLD-MCNC: 117 MG/DL (ref 70–108)
GLUCOSE BLD-MCNC: 119 MG/DL (ref 70–108)
GLUCOSE BLD-MCNC: 122 MG/DL (ref 70–108)
GLUCOSE BLD-MCNC: 124 MG/DL (ref 70–108)
GLUCOSE BLD-MCNC: 128 MG/DL (ref 70–108)
GLUCOSE BLD-MCNC: 128 MG/DL (ref 70–108)
GLUCOSE BLD-MCNC: 129 MG/DL (ref 70–108)
GLUCOSE BLD-MCNC: 129 MG/DL (ref 70–108)
GLUCOSE BLD-MCNC: 131 MG/DL (ref 70–108)
GLUCOSE BLD-MCNC: 132 MG/DL (ref 70–108)
GLUCOSE BLD-MCNC: 86 MG/DL (ref 70–108)
GLUCOSE BLD-MCNC: 90 MG/DL (ref 70–108)
GLUCOSE BLD-MCNC: 94 MG/DL (ref 70–108)
GLUCOSE BLD-MCNC: 95 MG/DL (ref 70–108)
GLUCOSE BLD-MCNC: 97 MG/DL (ref 70–108)
GLUCOSE URINE: NEGATIVE MG/DL
GLUCOSE URINE: NEGATIVE MG/DL
HCO3: 23 MMOL/L (ref 23–28)
HCT VFR BLD CALC: 30.5 % (ref 42–52)
HCT VFR BLD CALC: 32.8 % (ref 42–52)
HCT VFR BLD CALC: 34.4 % (ref 42–52)
HCT VFR BLD CALC: 40.5 % (ref 42–52)
HCT VFR BLD CALC: 41.9 % (ref 42–52)
HCT VFR BLD CALC: 43 % (ref 42–52)
HEMOGLOBIN: 10.2 GM/DL (ref 14–18)
HEMOGLOBIN: 10.9 GM/DL (ref 14–18)
HEMOGLOBIN: 11.3 GM/DL (ref 14–18)
HEMOGLOBIN: 13.3 GM/DL (ref 14–18)
HEMOGLOBIN: 13.4 GM/DL (ref 14–18)
HEMOGLOBIN: 14.2 GM/DL (ref 14–18)
HEPARIN THERAPY: NO
IFIO2: 100
IMMATURE GRANS (ABS): 0.02 THOU/MM3 (ref 0–0.07)
IMMATURE GRANS (ABS): 0.02 THOU/MM3 (ref 0–0.07)
IMMATURE GRANS (ABS): 0.05 THOU/MM3 (ref 0–0.07)
IMMATURE GRANS (ABS): 0.06 THOU/MM3 (ref 0–0.07)
IMMATURE GRANULOCYTES: 0.3 %
IMMATURE GRANULOCYTES: 0.3 %
IMMATURE GRANULOCYTES: 0.4 %
IMMATURE GRANULOCYTES: 0.4 %
INHIBITION AA TEG: 81.9 %
INHIBITION ADP TEG: 44.3 %
INR BLD: 1.07 (ref 0.85–1.13)
INR BLD: 1.08 (ref 0.85–1.13)
KETONES, URINE: NEGATIVE
KETONES, URINE: NEGATIVE
KINETICS TEG: 1.3 MINUTES (ref 1–3)
LACTIC ACID: 0.8 MMOL/L (ref 0.5–2.2)
LEUKOCYTE ESTERASE, URINE: ABNORMAL
LEUKOCYTE ESTERASE, URINE: NEGATIVE
LIPASE: 14.5 U/L (ref 5.6–51.3)
LIPASE: 18.9 U/L (ref 5.6–51.3)
LY30 (LYSIS) TEG: 1.4 % (ref 0–7.5)
LYMPHOCYTES # BLD: 12.2 %
LYMPHOCYTES # BLD: 15.3 %
LYMPHOCYTES # BLD: 5.4 %
LYMPHOCYTES # BLD: 7 %
LYMPHOCYTES ABSOLUTE: 0.8 THOU/MM3 (ref 1–4.8)
LYMPHOCYTES ABSOLUTE: 0.9 THOU/MM3 (ref 1–4.8)
LYMPHOCYTES ABSOLUTE: 0.9 THOU/MM3 (ref 1–4.8)
LYMPHOCYTES ABSOLUTE: 1.2 THOU/MM3 (ref 1–4.8)
MA (MAX CLOT) TEG: 62.8 MM (ref 50–70)
MA(AA) TEG: 19.7 MM
MA(ACTIVATED) TEG: 10.2 MM
MA(ADP) TEG: 39.5 MM
MAGNESIUM: 1.8 MG/DL (ref 1.6–2.4)
MAGNESIUM: 1.8 MG/DL (ref 1.6–2.4)
MAGNESIUM: 2 MG/DL (ref 1.6–2.4)
MAGNESIUM: 2.1 MG/DL (ref 1.6–2.4)
MAGNESIUM: 2.3 MG/DL (ref 1.6–2.4)
MCH RBC QN AUTO: 30.7 PG (ref 26–33)
MCH RBC QN AUTO: 30.9 PG (ref 26–33)
MCH RBC QN AUTO: 31.1 PG (ref 26–33)
MCH RBC QN AUTO: 31.5 PG (ref 26–33)
MCH RBC QN AUTO: 31.8 PG (ref 26–33)
MCH RBC QN AUTO: 31.9 PG (ref 26–33)
MCHC RBC AUTO-ENTMCNC: 32 GM/DL (ref 32.2–35.5)
MCHC RBC AUTO-ENTMCNC: 32.8 GM/DL (ref 32.2–35.5)
MCHC RBC AUTO-ENTMCNC: 32.8 GM/DL (ref 32.2–35.5)
MCHC RBC AUTO-ENTMCNC: 33 GM/DL (ref 32.2–35.5)
MCHC RBC AUTO-ENTMCNC: 33.2 GM/DL (ref 32.2–35.5)
MCHC RBC AUTO-ENTMCNC: 33.4 GM/DL (ref 32.2–35.5)
MCV RBC AUTO: 93.4 FL (ref 80–94)
MCV RBC AUTO: 94 FL (ref 80–94)
MCV RBC AUTO: 95 FL (ref 80–94)
MCV RBC AUTO: 95.9 FL (ref 80–94)
MCV RBC AUTO: 96 FL (ref 80–94)
MCV RBC AUTO: 96.6 FL (ref 80–94)
MISCELLANEOUS 2: ABNORMAL
MODE: ABNORMAL
MONOCYTES # BLD: 4.7 %
MONOCYTES # BLD: 6.8 %
MONOCYTES # BLD: 8.1 %
MONOCYTES # BLD: 8.2 %
MONOCYTES ABSOLUTE: 0.5 THOU/MM3 (ref 0.4–1.3)
MONOCYTES ABSOLUTE: 0.6 THOU/MM3 (ref 0.4–1.3)
MONOCYTES ABSOLUTE: 0.7 THOU/MM3 (ref 0.4–1.3)
MONOCYTES ABSOLUTE: 1 THOU/MM3 (ref 0.4–1.3)
MRSA SCREEN RT-PCR: NEGATIVE
MRSA SCREEN: NORMAL
NITRITE, URINE: NEGATIVE
NITRITE, URINE: POSITIVE
NUCLEATED RED BLOOD CELLS: 0 /100 WBC
O2 SATURATION: 100 %
ORGANISM: ABNORMAL
OSMOLALITY CALCULATION: 285.7 MOSMOL/KG (ref 275–300)
OSMOLALITY CALCULATION: 287 MOSMOL/KG (ref 275–300)
PCO2: 34 MMHG (ref 35–45)
PH BLOOD GAS: 7.45 (ref 7.35–7.45)
PH UA: 7
PH UA: 7.5 (ref 5–9)
PHOSPHORUS: 2.3 MG/DL (ref 2.4–4.7)
PHOSPHORUS: 2.6 MG/DL (ref 2.4–4.7)
PHOSPHORUS: 2.7 MG/DL (ref 2.4–4.7)
PHOSPHORUS: 2.8 MG/DL (ref 2.4–4.7)
PHOSPHORUS: 2.9 MG/DL (ref 2.4–4.7)
PHOSPHORUS: 3.1 MG/DL (ref 2.4–4.7)
PHOSPHORUS: 3.3 MG/DL (ref 2.4–4.7)
PLATELET # BLD: 138 THOU/MM3 (ref 130–400)
PLATELET # BLD: 145 THOU/MM3 (ref 130–400)
PLATELET # BLD: 173 THOU/MM3 (ref 130–400)
PLATELET # BLD: 200 THOU/MM3 (ref 130–400)
PLATELET # BLD: 238 THOU/MM3 (ref 130–400)
PLATELET # BLD: 271 THOU/MM3 (ref 130–400)
PMV BLD AUTO: 10 FL (ref 9.4–12.4)
PMV BLD AUTO: 10.2 FL (ref 9.4–12.4)
PMV BLD AUTO: 10.4 FL (ref 9.4–12.4)
PMV BLD AUTO: 9.2 FL (ref 9.4–12.4)
PMV BLD AUTO: 9.6 FL (ref 9.4–12.4)
PMV BLD AUTO: 9.9 FL (ref 9.4–12.4)
PO2: 443 MMHG (ref 71–104)
POTASSIUM REFLEX MAGNESIUM: 3.8 MEQ/L (ref 3.5–5.2)
POTASSIUM SERPL-SCNC: 3 MEQ/L (ref 3.5–5.2)
POTASSIUM SERPL-SCNC: 3 MEQ/L (ref 3.5–5.2)
POTASSIUM SERPL-SCNC: 3.3 MEQ/L (ref 3.5–5.2)
POTASSIUM SERPL-SCNC: 3.4 MEQ/L (ref 3.5–5.2)
POTASSIUM SERPL-SCNC: 3.5 MEQ/L (ref 3.5–5.2)
POTASSIUM SERPL-SCNC: 3.7 MEQ/L (ref 3.5–5.2)
POTASSIUM SERPL-SCNC: 3.8 MEQ/L (ref 3.5–5.2)
POTASSIUM SERPL-SCNC: 3.9 MEQ/L (ref 3.5–5.2)
POTASSIUM SERPL-SCNC: 3.9 MEQ/L (ref 3.5–5.2)
POTASSIUM SERPL-SCNC: 4 MEQ/L (ref 3.5–5.2)
POTASSIUM SERPL-SCNC: 4.2 MEQ/L (ref 3.5–5.2)
POTASSIUM SERPL-SCNC: 4.4 MEQ/L (ref 3.5–5.2)
POTASSIUM SERPL-SCNC: 4.5 MEQ/L (ref 3.5–5.2)
POTASSIUM SERPL-SCNC: 4.5 MEQ/L (ref 3.5–5.2)
PRO-BNP: 219 PG/ML (ref 0–1800)
PRO-BNP: 232 PG/ML (ref 0–1800)
PROCALCITONIN: 0.07 NG/ML (ref 0.01–0.09)
PROLACTIN: 31.2 NG/ML
PROTEIN UA: 30
PROTEIN UA: NEGATIVE
RBC # BLD: 3.21 MILL/MM3 (ref 4.7–6.1)
RBC # BLD: 3.51 MILL/MM3 (ref 4.7–6.1)
RBC # BLD: 3.66 MILL/MM3 (ref 4.7–6.1)
RBC # BLD: 4.22 MILL/MM3 (ref 4.7–6.1)
RBC # BLD: 4.37 MILL/MM3 (ref 4.7–6.1)
RBC # BLD: 4.45 MILL/MM3 (ref 4.7–6.1)
RBC URINE: ABNORMAL /HPF
REACTION TIME TEG: 4.8 MINUTES (ref 5–10)
RENAL EPITHELIAL, UA: ABNORMAL
RH FACTOR: NORMAL
SEG NEUTROPHILS: 73.9 %
SEG NEUTROPHILS: 78.9 %
SEG NEUTROPHILS: 82 %
SEG NEUTROPHILS: 88.2 %
SEGMENTED NEUTROPHILS ABSOLUTE COUNT: 10.1 THOU/MM3 (ref 1.8–7.7)
SEGMENTED NEUTROPHILS ABSOLUTE COUNT: 13.7 THOU/MM3 (ref 1.8–7.7)
SEGMENTED NEUTROPHILS ABSOLUTE COUNT: 5.7 THOU/MM3 (ref 1.8–7.7)
SEGMENTED NEUTROPHILS ABSOLUTE COUNT: 6 THOU/MM3 (ref 1.8–7.7)
SET PEEP: 6 MMHG
SET PRESS SUPP: 14 CMH2O
SET RESPIRATORY RATE: 12 BPM
SODIUM BLD-SCNC: 136 MEQ/L (ref 135–145)
SODIUM BLD-SCNC: 136 MEQ/L (ref 135–145)
SODIUM BLD-SCNC: 137 MEQ/L (ref 135–145)
SODIUM BLD-SCNC: 137 MEQ/L (ref 135–145)
SODIUM BLD-SCNC: 138 MEQ/L (ref 135–145)
SODIUM BLD-SCNC: 140 MEQ/L (ref 135–145)
SODIUM BLD-SCNC: 140 MEQ/L (ref 135–145)
SODIUM BLD-SCNC: 141 MEQ/L (ref 135–145)
SODIUM BLD-SCNC: 141 MEQ/L (ref 135–145)
SODIUM BLD-SCNC: 142 MEQ/L (ref 135–145)
SODIUM BLD-SCNC: 142 MEQ/L (ref 135–145)
SODIUM BLD-SCNC: 143 MEQ/L (ref 135–145)
SODIUM BLD-SCNC: 144 MEQ/L (ref 135–145)
SOURCE, BLOOD GAS: ABNORMAL
SPECIFIC GRAVITY, URINE: 1.01 (ref 1–1.03)
SPECIFIC GRAVITY, URINE: 1.02 (ref 1–1.03)
TOTAL PROTEIN: 5.5 G/DL (ref 6.1–8)
TOTAL PROTEIN: 6.2 G/DL (ref 6.1–8)
TOTAL PROTEIN: 7.1 G/DL (ref 6.1–8)
TROPONIN T: < 0.01 NG/ML
TROPONIN T: < 0.01 NG/ML
TSH SERPL DL<=0.05 MIU/L-ACNC: 1.22 UIU/ML (ref 0.4–4.2)
TSH SERPL DL<=0.05 MIU/L-ACNC: 2.41 UIU/ML (ref 0.4–4.2)
URINE CULTURE REFLEX: ABNORMAL
UROBILINOGEN, URINE: 1 EU/DL
UROBILINOGEN, URINE: 1 EU/DL (ref 0–1)
VANCOMYCIN RESISTANT ENTEROCOCCUS: NEGATIVE
VITAMIN D 25-HYDROXY: 27 NG/ML (ref 30–100)
WBC # BLD: 10.4 THOU/MM3 (ref 4.8–10.8)
WBC # BLD: 11.3 THOU/MM3 (ref 4.8–10.8)
WBC # BLD: 12.3 THOU/MM3 (ref 4.8–10.8)
WBC # BLD: 15.5 THOU/MM3 (ref 4.8–10.8)
WBC # BLD: 7.6 THOU/MM3 (ref 4.8–10.8)
WBC # BLD: 7.7 THOU/MM3 (ref 4.8–10.8)
WBC UA: > 200 /HPF
YEAST: ABNORMAL

## 2019-01-01 PROCEDURE — 97110 THERAPEUTIC EXERCISES: CPT

## 2019-01-01 PROCEDURE — 4040F PNEUMOC VAC/ADMIN/RCVD: CPT | Performed by: FAMILY MEDICINE

## 2019-01-01 PROCEDURE — 6370000000 HC RX 637 (ALT 250 FOR IP): Performed by: PHYSICIAN ASSISTANT

## 2019-01-01 PROCEDURE — 36415 COLL VENOUS BLD VENIPUNCTURE: CPT

## 2019-01-01 PROCEDURE — 97166 OT EVAL MOD COMPLEX 45 MIN: CPT

## 2019-01-01 PROCEDURE — 84100 ASSAY OF PHOSPHORUS: CPT

## 2019-01-01 PROCEDURE — 82330 ASSAY OF CALCIUM: CPT

## 2019-01-01 PROCEDURE — APPSS30 APP SPLIT SHARED TIME 16-30 MINUTES: Performed by: PHYSICIAN ASSISTANT

## 2019-01-01 PROCEDURE — 6370000000 HC RX 637 (ALT 250 FOR IP): Performed by: NEUROLOGICAL SURGERY

## 2019-01-01 PROCEDURE — 6360000002 HC RX W HCPCS: Performed by: FAMILY MEDICINE

## 2019-01-01 PROCEDURE — 51798 US URINE CAPACITY MEASURE: CPT

## 2019-01-01 PROCEDURE — 5A1945Z RESPIRATORY VENTILATION, 24-96 CONSECUTIVE HOURS: ICD-10-PCS | Performed by: NEUROLOGICAL SURGERY

## 2019-01-01 PROCEDURE — 2580000003 HC RX 258: Performed by: FAMILY MEDICINE

## 2019-01-01 PROCEDURE — 2700000000 HC OXYGEN THERAPY PER DAY

## 2019-01-01 PROCEDURE — 6370000000 HC RX 637 (ALT 250 FOR IP): Performed by: NURSE PRACTITIONER

## 2019-01-01 PROCEDURE — 85730 THROMBOPLASTIN TIME PARTIAL: CPT

## 2019-01-01 PROCEDURE — 37799 UNLISTED PX VASCULAR SURGERY: CPT

## 2019-01-01 PROCEDURE — APPSS60 APP SPLIT SHARED TIME 46-60 MINUTES: Performed by: NURSE PRACTITIONER

## 2019-01-01 PROCEDURE — 2580000003 HC RX 258: Performed by: NEUROLOGICAL SURGERY

## 2019-01-01 PROCEDURE — 84132 ASSAY OF SERUM POTASSIUM: CPT

## 2019-01-01 PROCEDURE — 80048 BASIC METABOLIC PNL TOTAL CA: CPT

## 2019-01-01 PROCEDURE — 6370000000 HC RX 637 (ALT 250 FOR IP): Performed by: FAMILY MEDICINE

## 2019-01-01 PROCEDURE — 2709999900 HC NON-CHARGEABLE SUPPLY

## 2019-01-01 PROCEDURE — G0422 INTENS CARDIAC REHAB W/EXERC: HCPCS

## 2019-01-01 PROCEDURE — P9037 PLATE PHERES LEUKOREDU IRRAD: HCPCS

## 2019-01-01 PROCEDURE — APPNB60 APP NON BILLABLE TIME 46-60 MINS: Performed by: NURSE PRACTITIONER

## 2019-01-01 PROCEDURE — APPSS45 APP SPLIT SHARED TIME 31-45 MINUTES: Performed by: PHYSICIAN ASSISTANT

## 2019-01-01 PROCEDURE — 0HQ1XZZ REPAIR FACE SKIN, EXTERNAL APPROACH: ICD-10-PCS | Performed by: EMERGENCY MEDICINE

## 2019-01-01 PROCEDURE — 6360000002 HC RX W HCPCS: Performed by: NEUROLOGICAL SURGERY

## 2019-01-01 PROCEDURE — 82140 ASSAY OF AMMONIA: CPT

## 2019-01-01 PROCEDURE — G0423 INTENS CARDIAC REHAB NO EXER: HCPCS

## 2019-01-01 PROCEDURE — 83735 ASSAY OF MAGNESIUM: CPT

## 2019-01-01 PROCEDURE — 97530 THERAPEUTIC ACTIVITIES: CPT

## 2019-01-01 PROCEDURE — 2580000003 HC RX 258: Performed by: NURSE PRACTITIONER

## 2019-01-01 PROCEDURE — G8427 DOCREV CUR MEDS BY ELIG CLIN: HCPCS | Performed by: FAMILY MEDICINE

## 2019-01-01 PROCEDURE — 97112 NEUROMUSCULAR REEDUCATION: CPT

## 2019-01-01 PROCEDURE — 1101F PT FALLS ASSESS-DOCD LE1/YR: CPT | Performed by: FAMILY MEDICINE

## 2019-01-01 PROCEDURE — 85025 COMPLETE CBC W/AUTO DIFF WBC: CPT

## 2019-01-01 PROCEDURE — 94760 N-INVAS EAR/PLS OXIMETRY 1: CPT

## 2019-01-01 PROCEDURE — 2500000003 HC RX 250 WO HCPCS: Performed by: PHYSICAL MEDICINE & REHABILITATION

## 2019-01-01 PROCEDURE — APPSS45 APP SPLIT SHARED TIME 31-45 MINUTES: Performed by: NURSE PRACTITIONER

## 2019-01-01 PROCEDURE — 82948 REAGENT STRIP/BLOOD GLUCOSE: CPT

## 2019-01-01 PROCEDURE — 1180000000 HC REHAB R&B

## 2019-01-01 PROCEDURE — 6360000002 HC RX W HCPCS: Performed by: NURSE PRACTITIONER

## 2019-01-01 PROCEDURE — P9035 PLATELET PHERES LEUKOREDUCED: HCPCS

## 2019-01-01 PROCEDURE — 99231 SBSQ HOSP IP/OBS SF/LOW 25: CPT | Performed by: PHYSICIAN ASSISTANT

## 2019-01-01 PROCEDURE — 99231 SBSQ HOSP IP/OBS SF/LOW 25: CPT | Performed by: SURGERY

## 2019-01-01 PROCEDURE — 92610 EVALUATE SWALLOWING FUNCTION: CPT

## 2019-01-01 PROCEDURE — C1751 CATH, INF, PER/CENT/MIDLINE: HCPCS

## 2019-01-01 PROCEDURE — 83880 ASSAY OF NATRIURETIC PEPTIDE: CPT

## 2019-01-01 PROCEDURE — 2000000000 HC ICU R&B

## 2019-01-01 PROCEDURE — 36430 TRANSFUSION BLD/BLD COMPNT: CPT

## 2019-01-01 PROCEDURE — 87040 BLOOD CULTURE FOR BACTERIA: CPT

## 2019-01-01 PROCEDURE — 84443 ASSAY THYROID STIM HORMONE: CPT

## 2019-01-01 PROCEDURE — 2500000003 HC RX 250 WO HCPCS: Performed by: FAMILY MEDICINE

## 2019-01-01 PROCEDURE — 97116 GAIT TRAINING THERAPY: CPT

## 2019-01-01 PROCEDURE — APPNB60 APP NON BILLABLE TIME 46-60 MINS: Performed by: PHYSICIAN ASSISTANT

## 2019-01-01 PROCEDURE — 92526 ORAL FUNCTION THERAPY: CPT

## 2019-01-01 PROCEDURE — G8420 CALC BMI NORM PARAMETERS: HCPCS | Performed by: FAMILY MEDICINE

## 2019-01-01 PROCEDURE — 99232 SBSQ HOSP IP/OBS MODERATE 35: CPT | Performed by: SURGERY

## 2019-01-01 PROCEDURE — 6370000000 HC RX 637 (ALT 250 FOR IP): Performed by: PHYSICAL MEDICINE & REHABILITATION

## 2019-01-01 PROCEDURE — 6360000002 HC RX W HCPCS: Performed by: PHYSICAL MEDICINE & REHABILITATION

## 2019-01-01 PROCEDURE — 95819 EEG AWAKE AND ASLEEP: CPT

## 2019-01-01 PROCEDURE — 97535 SELF CARE MNGMENT TRAINING: CPT

## 2019-01-01 PROCEDURE — 6360000002 HC RX W HCPCS

## 2019-01-01 PROCEDURE — 93005 ELECTROCARDIOGRAM TRACING: CPT | Performed by: EMERGENCY MEDICINE

## 2019-01-01 PROCEDURE — 84145 PROCALCITONIN (PCT): CPT

## 2019-01-01 PROCEDURE — 92523 SPEECH SOUND LANG COMPREHEN: CPT

## 2019-01-01 PROCEDURE — 86900 BLOOD TYPING SEROLOGIC ABO: CPT

## 2019-01-01 PROCEDURE — 3600000014 HC SURGERY LEVEL 4 ADDTL 15MIN: Performed by: NEUROLOGICAL SURGERY

## 2019-01-01 PROCEDURE — 87186 SC STD MICRODIL/AGAR DIL: CPT

## 2019-01-01 PROCEDURE — 2580000003 HC RX 258

## 2019-01-01 PROCEDURE — 2060000000 HC ICU INTERMEDIATE R&B

## 2019-01-01 PROCEDURE — 93660 TILT TABLE EVALUATION: CPT | Performed by: INTERNAL MEDICINE

## 2019-01-01 PROCEDURE — 94770 HC ETCO2 MONITOR DAILY: CPT

## 2019-01-01 PROCEDURE — 92611 MOTION FLUOROSCOPY/SWALLOW: CPT

## 2019-01-01 PROCEDURE — 84484 ASSAY OF TROPONIN QUANT: CPT

## 2019-01-01 PROCEDURE — 70450 CT HEAD/BRAIN W/O DYE: CPT

## 2019-01-01 PROCEDURE — 83605 ASSAY OF LACTIC ACID: CPT

## 2019-01-01 PROCEDURE — 1200000000 HC SEMI PRIVATE

## 2019-01-01 PROCEDURE — APPSS60 APP SPLIT SHARED TIME 46-60 MINUTES: Performed by: PHYSICIAN ASSISTANT

## 2019-01-01 PROCEDURE — 82248 BILIRUBIN DIRECT: CPT

## 2019-01-01 PROCEDURE — 6360000002 HC RX W HCPCS: Performed by: EMERGENCY MEDICINE

## 2019-01-01 PROCEDURE — 1036F TOBACCO NON-USER: CPT | Performed by: FAMILY MEDICINE

## 2019-01-01 PROCEDURE — 6360000002 HC RX W HCPCS: Performed by: PHYSICIAN ASSISTANT

## 2019-01-01 PROCEDURE — 92507 TX SP LANG VOICE COMM INDIV: CPT

## 2019-01-01 PROCEDURE — 72170 X-RAY EXAM OF PELVIS: CPT

## 2019-01-01 PROCEDURE — 99999 PR OFFICE/OUTPT VISIT,PROCEDURE ONLY: CPT | Performed by: NURSE PRACTITIONER

## 2019-01-01 PROCEDURE — 70486 CT MAXILLOFACIAL W/O DYE: CPT

## 2019-01-01 PROCEDURE — 6360000002 HC RX W HCPCS: Performed by: REGISTERED NURSE

## 2019-01-01 PROCEDURE — APPSS180 APP SPLIT SHARED TIME > 60 MINUTES: Performed by: PHYSICIAN ASSISTANT

## 2019-01-01 PROCEDURE — 2580000003 HC RX 258: Performed by: PHYSICAL MEDICINE & REHABILITATION

## 2019-01-01 PROCEDURE — 99232 SBSQ HOSP IP/OBS MODERATE 35: CPT | Performed by: PHYSICIAN ASSISTANT

## 2019-01-01 PROCEDURE — 2580000003 HC RX 258: Performed by: EMERGENCY MEDICINE

## 2019-01-01 PROCEDURE — 94003 VENT MGMT INPAT SUBQ DAY: CPT

## 2019-01-01 PROCEDURE — 81003 URINALYSIS AUTO W/O SCOPE: CPT

## 2019-01-01 PROCEDURE — 82803 BLOOD GASES ANY COMBINATION: CPT

## 2019-01-01 PROCEDURE — 6360000002 HC RX W HCPCS: Performed by: INTERNAL MEDICINE

## 2019-01-01 PROCEDURE — 87081 CULTURE SCREEN ONLY: CPT

## 2019-01-01 PROCEDURE — 99285 EMERGENCY DEPT VISIT HI MDM: CPT

## 2019-01-01 PROCEDURE — 71045 X-RAY EXAM CHEST 1 VIEW: CPT

## 2019-01-01 PROCEDURE — 94002 VENT MGMT INPAT INIT DAY: CPT

## 2019-01-01 PROCEDURE — 74230 X-RAY XM SWLNG FUNCJ C+: CPT

## 2019-01-01 PROCEDURE — 93010 ELECTROCARDIOGRAM REPORT: CPT | Performed by: INTERNAL MEDICINE

## 2019-01-01 PROCEDURE — 2500000003 HC RX 250 WO HCPCS

## 2019-01-01 PROCEDURE — 2780000010 HC IMPLANT OTHER: Performed by: NEUROLOGICAL SURGERY

## 2019-01-01 PROCEDURE — 2500000003 HC RX 250 WO HCPCS: Performed by: PHARMACIST

## 2019-01-01 PROCEDURE — 85576 BLOOD PLATELET AGGREGATION: CPT

## 2019-01-01 PROCEDURE — 85610 PROTHROMBIN TIME: CPT

## 2019-01-01 PROCEDURE — 81001 URINALYSIS AUTO W/SCOPE: CPT

## 2019-01-01 PROCEDURE — 80076 HEPATIC FUNCTION PANEL: CPT

## 2019-01-01 PROCEDURE — 97163 PT EVAL HIGH COMPLEX 45 MIN: CPT

## 2019-01-01 PROCEDURE — 2709999900 HC NON-CHARGEABLE SUPPLY: Performed by: NEUROLOGICAL SURGERY

## 2019-01-01 PROCEDURE — 1123F ACP DISCUSS/DSCN MKR DOCD: CPT | Performed by: FAMILY MEDICINE

## 2019-01-01 PROCEDURE — 87500 VANOMYCIN DNA AMP PROBE: CPT

## 2019-01-01 PROCEDURE — 99221 1ST HOSP IP/OBS SF/LOW 40: CPT | Performed by: INTERNAL MEDICINE

## 2019-01-01 PROCEDURE — 00C40ZZ EXTIRPATION OF MATTER FROM INTRACRANIAL SUBDURAL SPACE, OPEN APPROACH: ICD-10-PCS | Performed by: NEUROLOGICAL SURGERY

## 2019-01-01 PROCEDURE — 2580000003 HC RX 258: Performed by: PHYSICIAN ASSISTANT

## 2019-01-01 PROCEDURE — 86901 BLOOD TYPING SEROLOGIC RH(D): CPT

## 2019-01-01 PROCEDURE — 90471 IMMUNIZATION ADMIN: CPT | Performed by: EMERGENCY MEDICINE

## 2019-01-01 PROCEDURE — 99233 SBSQ HOSP IP/OBS HIGH 50: CPT | Performed by: INTERNAL MEDICINE

## 2019-01-01 PROCEDURE — 87804 INFLUENZA ASSAY W/OPTIC: CPT

## 2019-01-01 PROCEDURE — 99213 OFFICE O/P EST LOW 20 MIN: CPT | Performed by: FAMILY MEDICINE

## 2019-01-01 PROCEDURE — 2720000010 HC SURG SUPPLY STERILE: Performed by: NEUROLOGICAL SURGERY

## 2019-01-01 PROCEDURE — 82306 VITAMIN D 25 HYDROXY: CPT

## 2019-01-01 PROCEDURE — 90714 TD VACC NO PRESV 7 YRS+ IM: CPT | Performed by: EMERGENCY MEDICINE

## 2019-01-01 PROCEDURE — 2580000003 HC RX 258: Performed by: INTERNAL MEDICINE

## 2019-01-01 PROCEDURE — 87086 URINE CULTURE/COLONY COUNT: CPT

## 2019-01-01 PROCEDURE — 83690 ASSAY OF LIPASE: CPT

## 2019-01-01 PROCEDURE — 2500000003 HC RX 250 WO HCPCS: Performed by: INTERNAL MEDICINE

## 2019-01-01 PROCEDURE — 3600000004 HC SURGERY LEVEL 4 BASE: Performed by: NEUROLOGICAL SURGERY

## 2019-01-01 PROCEDURE — 80053 COMPREHEN METABOLIC PANEL: CPT

## 2019-01-01 PROCEDURE — 2500000003 HC RX 250 WO HCPCS: Performed by: REGISTERED NURSE

## 2019-01-01 PROCEDURE — 84146 ASSAY OF PROLACTIN: CPT

## 2019-01-01 PROCEDURE — 12013 RPR F/E/E/N/L/M 2.6-5.0 CM: CPT

## 2019-01-01 PROCEDURE — 99222 1ST HOSP IP/OBS MODERATE 55: CPT | Performed by: SURGERY

## 2019-01-01 PROCEDURE — 0BH17EZ INSERTION OF ENDOTRACHEAL AIRWAY INTO TRACHEA, VIA NATURAL OR ARTIFICIAL OPENING: ICD-10-PCS | Performed by: NEUROLOGICAL SURGERY

## 2019-01-01 PROCEDURE — 51701 INSERT BLADDER CATHETER: CPT

## 2019-01-01 PROCEDURE — 3700000001 HC ADD 15 MINUTES (ANESTHESIA): Performed by: NEUROLOGICAL SURGERY

## 2019-01-01 PROCEDURE — 76937 US GUIDE VASCULAR ACCESS: CPT

## 2019-01-01 PROCEDURE — 71046 X-RAY EXAM CHEST 2 VIEWS: CPT

## 2019-01-01 PROCEDURE — 6360000002 HC RX W HCPCS: Performed by: SURGERY

## 2019-01-01 PROCEDURE — 87641 MR-STAPH DNA AMP PROBE: CPT

## 2019-01-01 PROCEDURE — 2580000003 HC RX 258: Performed by: REGISTERED NURSE

## 2019-01-01 PROCEDURE — 3700000000 HC ANESTHESIA ATTENDED CARE: Performed by: NEUROLOGICAL SURGERY

## 2019-01-01 PROCEDURE — 85027 COMPLETE CBC AUTOMATED: CPT

## 2019-01-01 PROCEDURE — 72125 CT NECK SPINE W/O DYE: CPT

## 2019-01-01 PROCEDURE — 2500000003 HC RX 250 WO HCPCS: Performed by: NEUROLOGICAL SURGERY

## 2019-01-01 PROCEDURE — 87077 CULTURE AEROBIC IDENTIFY: CPT

## 2019-01-01 PROCEDURE — 86850 RBC ANTIBODY SCREEN: CPT

## 2019-01-01 PROCEDURE — 87184 SC STD DISK METHOD PER PLATE: CPT

## 2019-01-01 PROCEDURE — 36620 INSERTION CATHETER ARTERY: CPT

## 2019-01-01 DEVICE — FLOSEAL HEMOSTATIC MATRIX, 5 ML
Type: IMPLANTABLE DEVICE | Site: BRAIN | Status: FUNCTIONAL
Brand: FLOSEAL

## 2019-01-01 DEVICE — DURAGEN® SUTURABLE DURAL REGENERATION MATRIX, 3 IN X 3 IN (7.5 CM X 7.5 CM)
Type: IMPLANTABLE DEVICE | Site: BRAIN | Status: FUNCTIONAL
Brand: DURAGEN® SUTURABLE

## 2019-01-01 RX ORDER — SODIUM CHLORIDE 0.9 % (FLUSH) 0.9 %
10 SYRINGE (ML) INJECTION PRN
Status: DISCONTINUED | OUTPATIENT
Start: 2019-01-01 | End: 2019-01-01 | Stop reason: HOSPADM

## 2019-01-01 RX ORDER — ALBUTEROL SULFATE 2.5 MG/3ML
2.5 SOLUTION RESPIRATORY (INHALATION) EVERY 4 HOURS PRN
Status: DISCONTINUED | OUTPATIENT
Start: 2019-01-01 | End: 2019-01-01 | Stop reason: HOSPADM

## 2019-01-01 RX ORDER — DIPHENHYDRAMINE HYDROCHLORIDE 50 MG/ML
25 INJECTION INTRAMUSCULAR; INTRAVENOUS NIGHTLY
Status: DISCONTINUED | OUTPATIENT
Start: 2019-01-01 | End: 2019-01-01

## 2019-01-01 RX ORDER — ROPINIROLE 0.5 MG/1
0.5 TABLET, FILM COATED ORAL
Status: DISCONTINUED | OUTPATIENT
Start: 2019-01-01 | End: 2019-01-01 | Stop reason: HOSPADM

## 2019-01-01 RX ORDER — SENNA PLUS 8.6 MG/1
2 TABLET ORAL NIGHTLY PRN
Status: DISCONTINUED | OUTPATIENT
Start: 2019-01-01 | End: 2019-01-01 | Stop reason: HOSPADM

## 2019-01-01 RX ORDER — ZIPRASIDONE MESYLATE 20 MG/ML
10 INJECTION, POWDER, LYOPHILIZED, FOR SOLUTION INTRAMUSCULAR ONCE
Status: COMPLETED | OUTPATIENT
Start: 2019-01-01 | End: 2019-01-01

## 2019-01-01 RX ORDER — DEXAMETHASONE SODIUM PHOSPHATE 4 MG/ML
INJECTION, SOLUTION INTRA-ARTICULAR; INTRALESIONAL; INTRAMUSCULAR; INTRAVENOUS; SOFT TISSUE PRN
Status: DISCONTINUED | OUTPATIENT
Start: 2019-01-01 | End: 2019-01-01 | Stop reason: SDUPTHER

## 2019-01-01 RX ORDER — BACITRACIN 500 [USP'U]/G
OINTMENT OPHTHALMIC 3 TIMES DAILY
Status: CANCELLED | OUTPATIENT
Start: 2019-01-01

## 2019-01-01 RX ORDER — FINASTERIDE 5 MG/1
5 TABLET, FILM COATED ORAL DAILY
Status: DISCONTINUED | OUTPATIENT
Start: 2019-01-01 | End: 2019-01-01 | Stop reason: HOSPADM

## 2019-01-01 RX ORDER — ROPINIROLE 0.5 MG/1
0.5 TABLET, FILM COATED ORAL 3 TIMES DAILY
Qty: 270 TABLET | Refills: 1 | Status: ON HOLD | OUTPATIENT
Start: 2019-01-01 | End: 2019-01-01 | Stop reason: HOSPADM

## 2019-01-01 RX ORDER — LEUCINE, PHENYLALANINE, LYSINE, METHIONINE, ISOLEUCINE, VALINE, HISTIDINE, THREONINE, TRYPTOPHAN, ALANINE, GLYCINE, ARGININE, PROLINE, SERINE, TYROSINE, DEXTROSE 311; 238; 247; 170; 255; 247; 204; 179; 77; 880; 438; 489; 289; 213; 17; 5 MG/100ML; MG/100ML; MG/100ML; MG/100ML; MG/100ML; MG/100ML; MG/100ML; MG/100ML; MG/100ML; MG/100ML; MG/100ML; MG/100ML; MG/100ML; MG/100ML; MG/100ML; G/100ML
2000 INJECTION INTRAVENOUS CONTINUOUS
Status: DISPENSED | OUTPATIENT
Start: 2019-01-01 | End: 2019-01-01

## 2019-01-01 RX ORDER — PROMETHAZINE HYDROCHLORIDE 25 MG/ML
12.5 INJECTION, SOLUTION INTRAMUSCULAR; INTRAVENOUS
Status: DISCONTINUED | OUTPATIENT
Start: 2019-01-01 | End: 2019-01-01 | Stop reason: HOSPADM

## 2019-01-01 RX ORDER — MULTIVITAMIN WITH FOLIC ACID 400 MCG
1 TABLET ORAL DAILY
Status: DISCONTINUED | OUTPATIENT
Start: 2019-01-01 | End: 2019-01-01 | Stop reason: HOSPADM

## 2019-01-01 RX ORDER — CLONIDINE 0.1 MG/24H
1 PATCH, EXTENDED RELEASE TRANSDERMAL WEEKLY
Qty: 4 PATCH | Refills: 0
Start: 2019-04-12

## 2019-01-01 RX ORDER — CLONIDINE 0.1 MG/24H
1 PATCH, EXTENDED RELEASE TRANSDERMAL WEEKLY
Status: DISCONTINUED | OUTPATIENT
Start: 2019-01-01 | End: 2019-01-01 | Stop reason: HOSPADM

## 2019-01-01 RX ORDER — DIAPER,BRIEF,INFANT-TODD,DISP
EACH MISCELLANEOUS 2 TIMES DAILY
Status: DISCONTINUED | OUTPATIENT
Start: 2019-01-01 | End: 2019-01-01 | Stop reason: HOSPADM

## 2019-01-01 RX ORDER — TAMSULOSIN HYDROCHLORIDE 0.4 MG/1
0.4 CAPSULE ORAL NIGHTLY
Status: DISCONTINUED | OUTPATIENT
Start: 2019-01-01 | End: 2019-01-01 | Stop reason: HOSPADM

## 2019-01-01 RX ORDER — SODIUM CHLORIDE 9 MG/ML
INJECTION, SOLUTION INTRAVENOUS CONTINUOUS
Status: DISCONTINUED | OUTPATIENT
Start: 2019-01-01 | End: 2019-01-01 | Stop reason: HOSPADM

## 2019-01-01 RX ORDER — ALBUTEROL SULFATE 2.5 MG/3ML
2.5 SOLUTION RESPIRATORY (INHALATION) EVERY 4 HOURS PRN
Status: CANCELLED | OUTPATIENT
Start: 2019-01-01

## 2019-01-01 RX ORDER — MORPHINE SULFATE 4 MG/ML
4 INJECTION, SOLUTION INTRAMUSCULAR; INTRAVENOUS ONCE
Status: COMPLETED | OUTPATIENT
Start: 2019-01-01 | End: 2019-01-01

## 2019-01-01 RX ORDER — LEVETIRACETAM 500 MG/1
500 TABLET ORAL 2 TIMES DAILY
Status: DISCONTINUED | OUTPATIENT
Start: 2019-01-01 | End: 2019-01-01 | Stop reason: HOSPADM

## 2019-01-01 RX ORDER — DIPHENHYDRAMINE HYDROCHLORIDE 50 MG/ML
25 INJECTION INTRAMUSCULAR; INTRAVENOUS EVERY 8 HOURS PRN
Status: DISCONTINUED | OUTPATIENT
Start: 2019-01-01 | End: 2019-01-01 | Stop reason: HOSPADM

## 2019-01-01 RX ORDER — PANTOPRAZOLE SODIUM 40 MG/1
40 TABLET, DELAYED RELEASE ORAL
Status: DISCONTINUED | OUTPATIENT
Start: 2019-01-01 | End: 2019-01-01

## 2019-01-01 RX ORDER — AMLODIPINE BESYLATE 2.5 MG/1
2.5 TABLET ORAL DAILY
Status: DISCONTINUED | OUTPATIENT
Start: 2019-01-01 | End: 2019-01-01 | Stop reason: HOSPADM

## 2019-01-01 RX ORDER — ONDANSETRON 4 MG/1
4 TABLET, FILM COATED ORAL EVERY 8 HOURS PRN
Status: DISCONTINUED | OUTPATIENT
Start: 2019-01-01 | End: 2019-01-01 | Stop reason: HOSPADM

## 2019-01-01 RX ORDER — DEXTROSE MONOHYDRATE 50 MG/ML
100 INJECTION, SOLUTION INTRAVENOUS PRN
Status: DISCONTINUED | OUTPATIENT
Start: 2019-01-01 | End: 2019-01-01 | Stop reason: HOSPADM

## 2019-01-01 RX ORDER — DIPHENHYDRAMINE HYDROCHLORIDE 50 MG/ML
25 INJECTION INTRAMUSCULAR; INTRAVENOUS NIGHTLY
Status: DISCONTINUED | OUTPATIENT
Start: 2019-01-01 | End: 2019-01-01 | Stop reason: HOSPADM

## 2019-01-01 RX ORDER — MORPHINE SULFATE 4 MG/ML
4 INJECTION, SOLUTION INTRAMUSCULAR; INTRAVENOUS
Status: DISCONTINUED | OUTPATIENT
Start: 2019-01-01 | End: 2019-01-01 | Stop reason: HOSPADM

## 2019-01-01 RX ORDER — MORPHINE SULFATE 4 MG/ML
4 INJECTION, SOLUTION INTRAMUSCULAR; INTRAVENOUS
Status: CANCELLED | OUTPATIENT
Start: 2019-01-01

## 2019-01-01 RX ORDER — ONDANSETRON 2 MG/ML
4 INJECTION INTRAMUSCULAR; INTRAVENOUS EVERY 6 HOURS PRN
Status: CANCELLED | OUTPATIENT
Start: 2019-01-01

## 2019-01-01 RX ORDER — LEVETIRACETAM 500 MG/1
500 TABLET ORAL 2 TIMES DAILY
Status: DISCONTINUED | OUTPATIENT
Start: 2019-01-01 | End: 2019-01-01

## 2019-01-01 RX ORDER — ACETAMINOPHEN 650 MG/1
650 SUPPOSITORY RECTAL EVERY 4 HOURS PRN
Status: DISCONTINUED | OUTPATIENT
Start: 2019-01-01 | End: 2019-01-01 | Stop reason: HOSPADM

## 2019-01-01 RX ORDER — FAMOTIDINE 20 MG/1
20 TABLET, FILM COATED ORAL 2 TIMES DAILY
Status: CANCELLED | OUTPATIENT
Start: 2019-01-01

## 2019-01-01 RX ORDER — ONDANSETRON 2 MG/ML
4 INJECTION INTRAMUSCULAR; INTRAVENOUS EVERY 6 HOURS PRN
Status: DISCONTINUED | OUTPATIENT
Start: 2019-01-01 | End: 2019-01-01

## 2019-01-01 RX ORDER — ZIPRASIDONE MESYLATE 20 MG/ML
10 INJECTION, POWDER, LYOPHILIZED, FOR SOLUTION INTRAMUSCULAR EVERY 6 HOURS PRN
Status: CANCELLED | OUTPATIENT
Start: 2019-01-01

## 2019-01-01 RX ORDER — MORPHINE SULFATE 2 MG/ML
2 INJECTION, SOLUTION INTRAMUSCULAR; INTRAVENOUS
Status: CANCELLED | OUTPATIENT
Start: 2019-01-01

## 2019-01-01 RX ORDER — 0.9 % SODIUM CHLORIDE 0.9 %
250 INTRAVENOUS SOLUTION INTRAVENOUS ONCE
Status: DISCONTINUED | OUTPATIENT
Start: 2019-01-01 | End: 2019-01-01

## 2019-01-01 RX ORDER — MORPHINE SULFATE 2 MG/ML
2 INJECTION, SOLUTION INTRAMUSCULAR; INTRAVENOUS
Status: DISCONTINUED | OUTPATIENT
Start: 2019-01-01 | End: 2019-01-01 | Stop reason: HOSPADM

## 2019-01-01 RX ORDER — LEUCINE, PHENYLALANINE, LYSINE, METHIONINE, ISOLEUCINE, VALINE, HISTIDINE, THREONINE, TRYPTOPHAN, ALANINE, GLYCINE, ARGININE, PROLINE, SERINE, TYROSINE, DEXTROSE 311; 238; 247; 170; 255; 247; 204; 179; 77; 880; 438; 489; 289; 213; 17; 5 MG/100ML; MG/100ML; MG/100ML; MG/100ML; MG/100ML; MG/100ML; MG/100ML; MG/100ML; MG/100ML; MG/100ML; MG/100ML; MG/100ML; MG/100ML; MG/100ML; MG/100ML; G/100ML
2000 INJECTION INTRAVENOUS CONTINUOUS
Status: CANCELLED | OUTPATIENT
Start: 2019-01-01

## 2019-01-01 RX ORDER — ZIPRASIDONE MESYLATE 20 MG/ML
10 INJECTION, POWDER, LYOPHILIZED, FOR SOLUTION INTRAMUSCULAR EVERY 6 HOURS PRN
Status: DISCONTINUED | OUTPATIENT
Start: 2019-01-01 | End: 2019-01-01 | Stop reason: HOSPADM

## 2019-01-01 RX ORDER — ACETAMINOPHEN 325 MG/1
650 TABLET ORAL EVERY 4 HOURS PRN
Status: DISCONTINUED | OUTPATIENT
Start: 2019-01-01 | End: 2019-01-01 | Stop reason: HOSPADM

## 2019-01-01 RX ORDER — 0.9 % SODIUM CHLORIDE 0.9 %
1000 INTRAVENOUS SOLUTION INTRAVENOUS ONCE
Status: COMPLETED | OUTPATIENT
Start: 2019-01-01 | End: 2019-01-01

## 2019-01-01 RX ORDER — FENTANYL CITRATE 50 UG/ML
INJECTION, SOLUTION INTRAMUSCULAR; INTRAVENOUS PRN
Status: DISCONTINUED | OUTPATIENT
Start: 2019-01-01 | End: 2019-01-01 | Stop reason: SDUPTHER

## 2019-01-01 RX ORDER — SODIUM CHLORIDE 0.9 % (FLUSH) 0.9 %
10 SYRINGE (ML) INJECTION EVERY 12 HOURS SCHEDULED
Status: DISCONTINUED | OUTPATIENT
Start: 2019-01-01 | End: 2019-01-01 | Stop reason: HOSPADM

## 2019-01-01 RX ORDER — DEXTROSE MONOHYDRATE 50 MG/ML
INJECTION, SOLUTION INTRAVENOUS CONTINUOUS
Status: ACTIVE | OUTPATIENT
Start: 2019-01-01 | End: 2019-01-01

## 2019-01-01 RX ORDER — SODIUM CHLORIDE 9 MG/ML
INJECTION, SOLUTION INTRAVENOUS CONTINUOUS
Status: CANCELLED | OUTPATIENT
Start: 2019-01-01 | End: 2019-04-22

## 2019-01-01 RX ORDER — ATORVASTATIN CALCIUM 10 MG/1
10 TABLET, FILM COATED ORAL NIGHTLY
Status: DISCONTINUED | OUTPATIENT
Start: 2019-01-01 | End: 2019-01-01 | Stop reason: HOSPADM

## 2019-01-01 RX ORDER — DEXTROSE MONOHYDRATE 25 G/50ML
12.5 INJECTION, SOLUTION INTRAVENOUS PRN
Status: DISCONTINUED | OUTPATIENT
Start: 2019-01-01 | End: 2019-01-01 | Stop reason: HOSPADM

## 2019-01-01 RX ORDER — ACETAMINOPHEN 325 MG/1
650 TABLET ORAL EVERY 4 HOURS PRN
Status: CANCELLED | OUTPATIENT
Start: 2019-01-01

## 2019-01-01 RX ORDER — FENTANYL CITRATE 50 UG/ML
INJECTION, SOLUTION INTRAMUSCULAR; INTRAVENOUS
Status: DISCONTINUED
Start: 2019-01-01 | End: 2019-01-01

## 2019-01-01 RX ORDER — FAMOTIDINE 20 MG/1
20 TABLET, FILM COATED ORAL 2 TIMES DAILY
Status: DISCONTINUED | OUTPATIENT
Start: 2019-01-01 | End: 2019-01-01 | Stop reason: HOSPADM

## 2019-01-01 RX ORDER — ROCURONIUM BROMIDE 10 MG/ML
INJECTION, SOLUTION INTRAVENOUS PRN
Status: DISCONTINUED | OUTPATIENT
Start: 2019-01-01 | End: 2019-01-01 | Stop reason: SDUPTHER

## 2019-01-01 RX ORDER — DIPHENHYDRAMINE HYDROCHLORIDE 50 MG/ML
12.5 INJECTION INTRAMUSCULAR; INTRAVENOUS
Status: DISCONTINUED | OUTPATIENT
Start: 2019-01-01 | End: 2019-01-01 | Stop reason: HOSPADM

## 2019-01-01 RX ORDER — ROPINIROLE 0.5 MG/1
0.5 TABLET, FILM COATED ORAL 3 TIMES DAILY
Status: COMPLETED | OUTPATIENT
Start: 2019-01-01 | End: 2019-01-01

## 2019-01-01 RX ORDER — LEVETIRACETAM 500 MG/1
500 TABLET ORAL 2 TIMES DAILY
Status: DISCONTINUED | OUTPATIENT
Start: 2019-01-01 | End: 2019-01-01 | Stop reason: ALTCHOICE

## 2019-01-01 RX ORDER — MULTIVITAMIN WITH FOLIC ACID 400 MCG
1 TABLET ORAL DAILY
Status: CANCELLED | OUTPATIENT
Start: 2019-01-01

## 2019-01-01 RX ORDER — LIDOCAINE HYDROCHLORIDE AND EPINEPHRINE 10; 10 MG/ML; UG/ML
INJECTION, SOLUTION INFILTRATION; PERINEURAL
Status: DISPENSED
Start: 2019-01-01 | End: 2019-01-01

## 2019-01-01 RX ORDER — GLYCOPYRROLATE 1 MG/5 ML
SYRINGE (ML) INTRAVENOUS PRN
Status: DISCONTINUED | OUTPATIENT
Start: 2019-01-01 | End: 2019-01-01 | Stop reason: SDUPTHER

## 2019-01-01 RX ORDER — TRAZODONE HYDROCHLORIDE 50 MG/1
75 TABLET ORAL NIGHTLY
Status: DISCONTINUED | OUTPATIENT
Start: 2019-01-01 | End: 2019-01-01 | Stop reason: HOSPADM

## 2019-01-01 RX ORDER — POTASSIUM CHLORIDE 7.45 MG/ML
10 INJECTION INTRAVENOUS
Status: COMPLETED | OUTPATIENT
Start: 2019-01-01 | End: 2019-01-01

## 2019-01-01 RX ORDER — SODIUM CHLORIDE 0.9 % (FLUSH) 0.9 %
10 SYRINGE (ML) INJECTION PRN
Status: CANCELLED | OUTPATIENT
Start: 2019-01-01

## 2019-01-01 RX ORDER — POTASSIUM CHLORIDE 7.45 MG/ML
10 INJECTION INTRAVENOUS
Status: DISPENSED | OUTPATIENT
Start: 2019-01-01 | End: 2019-01-01

## 2019-01-01 RX ORDER — SUCCINYLCHOLINE/SOD CL,ISO/PF 100 MG/5ML
SYRINGE (ML) INTRAVENOUS PRN
Status: DISCONTINUED | OUTPATIENT
Start: 2019-01-01 | End: 2019-01-01 | Stop reason: SDUPTHER

## 2019-01-01 RX ORDER — DIPHENHYDRAMINE HYDROCHLORIDE 50 MG/ML
25 INJECTION INTRAMUSCULAR; INTRAVENOUS NIGHTLY
Status: CANCELLED | OUTPATIENT
Start: 2019-01-01

## 2019-01-01 RX ORDER — SODIUM CHLORIDE 9 MG/ML
INJECTION, SOLUTION INTRAVENOUS CONTINUOUS
Status: CANCELLED | OUTPATIENT
Start: 2019-01-01

## 2019-01-01 RX ORDER — ONDANSETRON 2 MG/ML
4 INJECTION INTRAMUSCULAR; INTRAVENOUS EVERY 6 HOURS PRN
Status: DISCONTINUED | OUTPATIENT
Start: 2019-01-01 | End: 2019-01-01 | Stop reason: HOSPADM

## 2019-01-01 RX ORDER — MORPHINE SULFATE 2 MG/ML
4 INJECTION, SOLUTION INTRAMUSCULAR; INTRAVENOUS
Status: DISCONTINUED | OUTPATIENT
Start: 2019-01-01 | End: 2019-01-01

## 2019-01-01 RX ORDER — CEFAZOLIN SODIUM 1 G/50ML
1 INJECTION, SOLUTION INTRAVENOUS EVERY 8 HOURS
Status: DISCONTINUED | OUTPATIENT
Start: 2019-01-01 | End: 2019-01-01 | Stop reason: ALTCHOICE

## 2019-01-01 RX ORDER — BACITRACIN 500 [USP'U]/G
OINTMENT OPHTHALMIC 3 TIMES DAILY
Status: DISCONTINUED | OUTPATIENT
Start: 2019-01-01 | End: 2019-01-01 | Stop reason: HOSPADM

## 2019-01-01 RX ORDER — SODIUM CHLORIDE 9 MG/ML
INJECTION, SOLUTION INTRAVENOUS CONTINUOUS
Status: DISCONTINUED | OUTPATIENT
Start: 2019-01-01 | End: 2019-01-01

## 2019-01-01 RX ORDER — SOLIFENACIN SUCCINATE 5 MG/1
TABLET, FILM COATED ORAL
Qty: 90 TABLET | Refills: 1 | Status: ON HOLD | OUTPATIENT
Start: 2019-01-01 | End: 2019-01-01 | Stop reason: HOSPADM

## 2019-01-01 RX ORDER — DOCUSATE SODIUM 100 MG/1
100 CAPSULE, LIQUID FILLED ORAL 2 TIMES DAILY
Status: DISCONTINUED | OUTPATIENT
Start: 2019-01-01 | End: 2019-01-01

## 2019-01-01 RX ORDER — ROPINIROLE 0.5 MG/1
0.5 TABLET, FILM COATED ORAL 3 TIMES DAILY
Status: DISCONTINUED | OUTPATIENT
Start: 2019-01-01 | End: 2019-01-01 | Stop reason: HOSPADM

## 2019-01-01 RX ORDER — POTASSIUM CHLORIDE 20 MEQ/1
20 TABLET, EXTENDED RELEASE ORAL ONCE
Status: COMPLETED | OUTPATIENT
Start: 2019-01-01 | End: 2019-01-01

## 2019-01-01 RX ORDER — ROPINIROLE 0.5 MG/1
0.5 TABLET, FILM COATED ORAL 3 TIMES DAILY
Status: CANCELLED | OUTPATIENT
Start: 2019-01-01

## 2019-01-01 RX ORDER — DOCUSATE SODIUM 100 MG/1
100 CAPSULE, LIQUID FILLED ORAL 2 TIMES DAILY
Status: DISCONTINUED | OUTPATIENT
Start: 2019-01-01 | End: 2019-01-01 | Stop reason: HOSPADM

## 2019-01-01 RX ORDER — LABETALOL HYDROCHLORIDE 5 MG/ML
5 INJECTION, SOLUTION INTRAVENOUS EVERY 10 MIN PRN
Status: DISCONTINUED | OUTPATIENT
Start: 2019-01-01 | End: 2019-01-01 | Stop reason: HOSPADM

## 2019-01-01 RX ORDER — PROPOFOL 10 MG/ML
INJECTION, EMULSION INTRAVENOUS PRN
Status: DISCONTINUED | OUTPATIENT
Start: 2019-01-01 | End: 2019-01-01 | Stop reason: SDUPTHER

## 2019-01-01 RX ORDER — 0.9 % SODIUM CHLORIDE 0.9 %
250 INTRAVENOUS SOLUTION INTRAVENOUS ONCE
Status: COMPLETED | OUTPATIENT
Start: 2019-01-01 | End: 2019-01-01

## 2019-01-01 RX ORDER — MANNITOL 250 MG/ML
INJECTION, SOLUTION INTRAVENOUS PRN
Status: DISCONTINUED | OUTPATIENT
Start: 2019-01-01 | End: 2019-01-01 | Stop reason: SDUPTHER

## 2019-01-01 RX ORDER — ACETAMINOPHEN 650 MG/1
650 SUPPOSITORY RECTAL EVERY 4 HOURS PRN
Status: CANCELLED | OUTPATIENT
Start: 2019-01-01

## 2019-01-01 RX ORDER — CEFAZOLIN SODIUM 1 G/3ML
INJECTION, POWDER, FOR SOLUTION INTRAMUSCULAR; INTRAVENOUS PRN
Status: DISCONTINUED | OUTPATIENT
Start: 2019-01-01 | End: 2019-01-01 | Stop reason: SDUPTHER

## 2019-01-01 RX ORDER — POTASSIUM CHLORIDE 20 MEQ/1
40 TABLET, EXTENDED RELEASE ORAL ONCE
Status: COMPLETED | OUTPATIENT
Start: 2019-01-01 | End: 2019-01-01

## 2019-01-01 RX ORDER — NICOTINE POLACRILEX 4 MG
15 LOZENGE BUCCAL PRN
Status: DISCONTINUED | OUTPATIENT
Start: 2019-01-01 | End: 2019-01-01 | Stop reason: HOSPADM

## 2019-01-01 RX ORDER — FENTANYL CITRATE 50 UG/ML
50 INJECTION, SOLUTION INTRAMUSCULAR; INTRAVENOUS EVERY 5 MIN PRN
Status: DISCONTINUED | OUTPATIENT
Start: 2019-01-01 | End: 2019-01-01 | Stop reason: HOSPADM

## 2019-01-01 RX ORDER — SODIUM CHLORIDE 0.9 % (FLUSH) 0.9 %
10 SYRINGE (ML) INJECTION EVERY 12 HOURS SCHEDULED
Status: CANCELLED | OUTPATIENT
Start: 2019-01-01

## 2019-01-01 RX ORDER — ATORVASTATIN CALCIUM 10 MG/1
10 TABLET, FILM COATED ORAL NIGHTLY
Status: CANCELLED | OUTPATIENT
Start: 2019-01-01

## 2019-01-01 RX ORDER — ZIPRASIDONE MESYLATE 20 MG/ML
10 INJECTION, POWDER, LYOPHILIZED, FOR SOLUTION INTRAMUSCULAR EVERY 6 HOURS PRN
Status: DISCONTINUED | OUTPATIENT
Start: 2019-01-01 | End: 2019-01-01

## 2019-01-01 RX ORDER — MORPHINE SULFATE 2 MG/ML
2 INJECTION, SOLUTION INTRAMUSCULAR; INTRAVENOUS
Status: DISCONTINUED | OUTPATIENT
Start: 2019-01-01 | End: 2019-01-01

## 2019-01-01 RX ORDER — GINSENG 100 MG
CAPSULE ORAL PRN
Status: DISCONTINUED | OUTPATIENT
Start: 2019-01-01 | End: 2019-01-01 | Stop reason: HOSPADM

## 2019-01-01 RX ORDER — LEUCINE, PHENYLALANINE, LYSINE, METHIONINE, ISOLEUCINE, VALINE, HISTIDINE, THREONINE, TRYPTOPHAN, ALANINE, GLYCINE, ARGININE, PROLINE, SERINE, TYROSINE, DEXTROSE 311; 238; 247; 170; 255; 247; 204; 179; 77; 880; 438; 489; 289; 213; 17; 5 MG/100ML; MG/100ML; MG/100ML; MG/100ML; MG/100ML; MG/100ML; MG/100ML; MG/100ML; MG/100ML; MG/100ML; MG/100ML; MG/100ML; MG/100ML; MG/100ML; MG/100ML; G/100ML
2000 INJECTION INTRAVENOUS CONTINUOUS
Status: ACTIVE | OUTPATIENT
Start: 2019-01-01 | End: 2019-01-01

## 2019-01-01 RX ORDER — BUPIVACAINE HYDROCHLORIDE AND EPINEPHRINE 5; 5 MG/ML; UG/ML
INJECTION, SOLUTION EPIDURAL; INTRACAUDAL; PERINEURAL PRN
Status: DISCONTINUED | OUTPATIENT
Start: 2019-01-01 | End: 2019-01-01 | Stop reason: HOSPADM

## 2019-01-01 RX ORDER — METOCLOPRAMIDE HYDROCHLORIDE 5 MG/ML
10 INJECTION INTRAMUSCULAR; INTRAVENOUS
Status: DISCONTINUED | OUTPATIENT
Start: 2019-01-01 | End: 2019-01-01 | Stop reason: HOSPADM

## 2019-01-01 RX ORDER — AMLODIPINE BESYLATE 2.5 MG/1
2.5 TABLET ORAL DAILY
Status: CANCELLED | OUTPATIENT
Start: 2019-01-01

## 2019-01-01 RX ORDER — DOCUSATE SODIUM 100 MG/1
100 CAPSULE, LIQUID FILLED ORAL 2 TIMES DAILY
Status: CANCELLED | OUTPATIENT
Start: 2019-01-01

## 2019-01-01 RX ORDER — ALBUTEROL SULFATE 2.5 MG/3ML
2.5 SOLUTION RESPIRATORY (INHALATION) EVERY 4 HOURS PRN
Qty: 120 EACH | Refills: 3
Start: 2019-01-01

## 2019-01-01 RX ORDER — LEVETIRACETAM 500 MG/1
500 TABLET ORAL 2 TIMES DAILY
Status: CANCELLED | OUTPATIENT
Start: 2019-01-01

## 2019-01-01 RX ORDER — FENTANYL CITRATE 50 UG/ML
25 INJECTION, SOLUTION INTRAMUSCULAR; INTRAVENOUS EVERY 5 MIN PRN
Status: DISCONTINUED | OUTPATIENT
Start: 2019-01-01 | End: 2019-01-01 | Stop reason: HOSPADM

## 2019-01-01 RX ORDER — SODIUM CHLORIDE 9 MG/ML
INJECTION, SOLUTION INTRAVENOUS CONTINUOUS PRN
Status: DISCONTINUED | OUTPATIENT
Start: 2019-01-01 | End: 2019-01-01 | Stop reason: SDUPTHER

## 2019-01-01 RX ORDER — FENTANYL CITRATE 50 UG/ML
50 INJECTION, SOLUTION INTRAMUSCULAR; INTRAVENOUS ONCE
Status: COMPLETED | OUTPATIENT
Start: 2019-01-01 | End: 2019-01-01

## 2019-01-01 RX ORDER — DOCUSATE SODIUM 100 MG/1
100 CAPSULE, LIQUID FILLED ORAL 2 TIMES DAILY PRN
Status: DISCONTINUED | OUTPATIENT
Start: 2019-01-01 | End: 2019-01-01 | Stop reason: HOSPADM

## 2019-01-01 RX ORDER — MEPERIDINE HYDROCHLORIDE 25 MG/ML
12.5 INJECTION INTRAMUSCULAR; INTRAVENOUS; SUBCUTANEOUS EVERY 5 MIN PRN
Status: DISCONTINUED | OUTPATIENT
Start: 2019-01-01 | End: 2019-01-01 | Stop reason: HOSPADM

## 2019-01-01 RX ORDER — TAMSULOSIN HYDROCHLORIDE 0.4 MG/1
0.4 CAPSULE ORAL NIGHTLY
Status: CANCELLED | OUTPATIENT
Start: 2019-01-01

## 2019-01-01 RX ORDER — LEUCINE, PHENYLALANINE, LYSINE, METHIONINE, ISOLEUCINE, VALINE, HISTIDINE, THREONINE, TRYPTOPHAN, ALANINE, GLYCINE, ARGININE, PROLINE, SERINE, TYROSINE, DEXTROSE 311; 238; 247; 170; 255; 247; 204; 179; 77; 880; 438; 489; 289; 213; 17; 5 MG/100ML; MG/100ML; MG/100ML; MG/100ML; MG/100ML; MG/100ML; MG/100ML; MG/100ML; MG/100ML; MG/100ML; MG/100ML; MG/100ML; MG/100ML; MG/100ML; MG/100ML; G/100ML
2000 INJECTION INTRAVENOUS CONTINUOUS
Status: DISCONTINUED | OUTPATIENT
Start: 2019-01-01 | End: 2019-01-01 | Stop reason: HOSPADM

## 2019-01-01 RX ADMIN — TAMSULOSIN HYDROCHLORIDE 0.4 MG: 0.4 CAPSULE ORAL at 21:26

## 2019-01-01 RX ADMIN — SODIUM CHLORIDE: 9 INJECTION, SOLUTION INTRAVENOUS at 23:01

## 2019-01-01 RX ADMIN — MICONAZOLE NITRATE: 2 POWDER TOPICAL at 22:12

## 2019-01-01 RX ADMIN — LEVETIRACETAM 500 MG: 100 INJECTION, SOLUTION INTRAVENOUS at 10:50

## 2019-01-01 RX ADMIN — ATORVASTATIN CALCIUM 10 MG: 10 TABLET, FILM COATED ORAL at 20:42

## 2019-01-01 RX ADMIN — Medication 10 ML: at 20:46

## 2019-01-01 RX ADMIN — BACITRACIN: 500 OINTMENT OPHTHALMIC at 10:10

## 2019-01-01 RX ADMIN — CARBIDOPA AND LEVODOPA 1.5 TABLET: 25; 100 TABLET ORAL at 13:53

## 2019-01-01 RX ADMIN — CARBIDOPA AND LEVODOPA 1.5 TABLET: 25; 100 TABLET ORAL at 20:32

## 2019-01-01 RX ADMIN — Medication 10 ML: at 20:40

## 2019-01-01 RX ADMIN — MORPHINE SULFATE 2 MG: 2 INJECTION, SOLUTION INTRAMUSCULAR; INTRAVENOUS at 14:34

## 2019-01-01 RX ADMIN — PROPOFOL 100 MG: 10 INJECTION, EMULSION INTRAVENOUS at 17:20

## 2019-01-01 RX ADMIN — MORPHINE SULFATE 4 MG: 4 INJECTION INTRAVENOUS at 00:18

## 2019-01-01 RX ADMIN — DIPHENHYDRAMINE HYDROCHLORIDE 25 MG: 50 INJECTION, SOLUTION INTRAMUSCULAR; INTRAVENOUS at 23:19

## 2019-01-01 RX ADMIN — MICONAZOLE NITRATE: 2 POWDER TOPICAL at 09:24

## 2019-01-01 RX ADMIN — MICONAZOLE NITRATE: 2 POWDER TOPICAL at 22:31

## 2019-01-01 RX ADMIN — CEFAZOLIN SODIUM 1 G: 1 INJECTION, SOLUTION INTRAVENOUS at 16:01

## 2019-01-01 RX ADMIN — LEVETIRACETAM 500 MG: 100 INJECTION, SOLUTION INTRAVENOUS at 15:10

## 2019-01-01 RX ADMIN — CEFAZOLIN SODIUM 1 G: 1 INJECTION, SOLUTION INTRAVENOUS at 16:18

## 2019-01-01 RX ADMIN — SODIUM CHLORIDE 1000 ML: 9 INJECTION, SOLUTION INTRAVENOUS at 13:38

## 2019-01-01 RX ADMIN — CARBIDOPA AND LEVODOPA 1.5 TABLET: 25; 100 TABLET ORAL at 20:42

## 2019-01-01 RX ADMIN — SODIUM CHLORIDE: 9 INJECTION, SOLUTION INTRAVENOUS at 16:39

## 2019-01-01 RX ADMIN — ROPINIROLE HYDROCHLORIDE 0.5 MG: 0.5 TABLET, FILM COATED ORAL at 14:34

## 2019-01-01 RX ADMIN — FAMOTIDINE 20 MG: 20 TABLET ORAL at 09:23

## 2019-01-01 RX ADMIN — CLOSTRIDIUM TETANI TOXOID ANTIGEN (FORMALDEHYDE INACTIVATED) AND CORYNEBACTERIUM DIPHTHERIAE TOXOID ANTIGEN (FORMALDEHYDE INACTIVATED) 0.5 ML: 5; 2 INJECTION, SUSPENSION INTRAMUSCULAR at 09:10

## 2019-01-01 RX ADMIN — LEVETIRACETAM 500 MG: 100 INJECTION, SOLUTION INTRAVENOUS at 00:34

## 2019-01-01 RX ADMIN — BARIUM SULFATE 20 ML: 0.81 POWDER, FOR SUSPENSION ORAL at 11:50

## 2019-01-01 RX ADMIN — CARBIDOPA AND LEVODOPA 1.5 TABLET: 25; 100 TABLET ORAL at 09:32

## 2019-01-01 RX ADMIN — DOCUSATE SODIUM 100 MG: 100 CAPSULE, LIQUID FILLED ORAL at 20:32

## 2019-01-01 RX ADMIN — FAMOTIDINE 20 MG: 20 TABLET ORAL at 19:48

## 2019-01-01 RX ADMIN — BACITRACIN: 500 OINTMENT OPHTHALMIC at 09:32

## 2019-01-01 RX ADMIN — FAMOTIDINE: 10 INJECTION, SOLUTION INTRAVENOUS at 19:30

## 2019-01-01 RX ADMIN — BACITRACIN: 500 OINTMENT OPHTHALMIC at 15:16

## 2019-01-01 RX ADMIN — ATORVASTATIN CALCIUM 10 MG: 10 TABLET, FILM COATED ORAL at 19:47

## 2019-01-01 RX ADMIN — LEVETIRACETAM 500 MG: 500 TABLET, FILM COATED ORAL at 10:53

## 2019-01-01 RX ADMIN — FAMOTIDINE: 10 INJECTION, SOLUTION INTRAVENOUS at 17:49

## 2019-01-01 RX ADMIN — BACITRACIN: 500 OINTMENT OPHTHALMIC at 08:32

## 2019-01-01 RX ADMIN — DIPHENHYDRAMINE HYDROCHLORIDE 25 MG: 50 INJECTION, SOLUTION INTRAMUSCULAR; INTRAVENOUS at 21:37

## 2019-01-01 RX ADMIN — LEVETIRACETAM 500 MG: 100 INJECTION, SOLUTION INTRAVENOUS at 21:48

## 2019-01-01 RX ADMIN — BACITRACIN: 500 OINTMENT OPHTHALMIC at 09:24

## 2019-01-01 RX ADMIN — AMLODIPINE BESYLATE 2.5 MG: 2.5 TABLET ORAL at 09:24

## 2019-01-01 RX ADMIN — SODIUM CHLORIDE: 9 INJECTION, SOLUTION INTRAVENOUS at 12:15

## 2019-01-01 RX ADMIN — SODIUM CHLORIDE: 9 INJECTION, SOLUTION INTRAVENOUS at 17:36

## 2019-01-01 RX ADMIN — CARBIDOPA AND LEVODOPA 1.5 TABLET: 25; 100 TABLET ORAL at 09:40

## 2019-01-01 RX ADMIN — BACITRACIN: 500 OINTMENT OPHTHALMIC at 20:56

## 2019-01-01 RX ADMIN — FAMOTIDINE 20 MG: 20 TABLET ORAL at 09:55

## 2019-01-01 RX ADMIN — ROPINIROLE HYDROCHLORIDE 0.5 MG: 0.5 TABLET, FILM COATED ORAL at 10:13

## 2019-01-01 RX ADMIN — ACETAMINOPHEN 650 MG: 325 TABLET ORAL at 19:48

## 2019-01-01 RX ADMIN — BACITRACIN: 500 OINTMENT OPHTHALMIC at 14:34

## 2019-01-01 RX ADMIN — HYDROCORTISONE: 1 CREAM TOPICAL at 10:10

## 2019-01-01 RX ADMIN — LEVETIRACETAM 500 MG: 100 INJECTION, SOLUTION INTRAVENOUS at 02:40

## 2019-01-01 RX ADMIN — LEVETIRACETAM 500 MG: 100 INJECTION, SOLUTION INTRAVENOUS at 14:15

## 2019-01-01 RX ADMIN — ROPINIROLE HYDROCHLORIDE 0.5 MG: 0.5 TABLET, FILM COATED ORAL at 06:08

## 2019-01-01 RX ADMIN — ROPINIROLE HYDROCHLORIDE 0.5 MG: 0.5 TABLET, FILM COATED ORAL at 21:26

## 2019-01-01 RX ADMIN — Medication 10 ML: at 09:59

## 2019-01-01 RX ADMIN — LEVETIRACETAM 500 MG: 500 TABLET, FILM COATED ORAL at 20:56

## 2019-01-01 RX ADMIN — HYDROCORTISONE: 1 CREAM TOPICAL at 20:56

## 2019-01-01 RX ADMIN — CEFAZOLIN SODIUM 1 G: 1 INJECTION, SOLUTION INTRAVENOUS at 01:42

## 2019-01-01 RX ADMIN — LEVETIRACETAM 500 MG: 100 INJECTION, SOLUTION INTRAVENOUS at 22:41

## 2019-01-01 RX ADMIN — MICONAZOLE NITRATE: 2 POWDER TOPICAL at 10:27

## 2019-01-01 RX ADMIN — CEFAZOLIN 1 G: 1 INJECTION, POWDER, FOR SOLUTION INTRAMUSCULAR; INTRAVENOUS; PARENTERAL at 09:01

## 2019-01-01 RX ADMIN — FENTANYL CITRATE 50 MCG: 50 INJECTION INTRAMUSCULAR; INTRAVENOUS at 17:54

## 2019-01-01 RX ADMIN — MICONAZOLE NITRATE: 2 POWDER TOPICAL at 10:10

## 2019-01-01 RX ADMIN — ACETAMINOPHEN 650 MG: 325 TABLET ORAL at 05:48

## 2019-01-01 RX ADMIN — POTASSIUM CHLORIDE 10 MEQ: 7.46 INJECTION, SOLUTION INTRAVENOUS at 18:02

## 2019-01-01 RX ADMIN — CEFAZOLIN SODIUM 1 G: 1 INJECTION, SOLUTION INTRAVENOUS at 17:40

## 2019-01-01 RX ADMIN — HYDROCORTISONE: 1 CREAM TOPICAL at 22:30

## 2019-01-01 RX ADMIN — AMPICILLIN SODIUM AND SULBACTAM SODIUM 3 G: 2; 1 INJECTION, POWDER, FOR SOLUTION INTRAMUSCULAR; INTRAVENOUS at 11:51

## 2019-01-01 RX ADMIN — MORPHINE SULFATE 2 MG: 2 INJECTION, SOLUTION INTRAMUSCULAR; INTRAVENOUS at 05:23

## 2019-01-01 RX ADMIN — LEVETIRACETAM 500 MG: 100 INJECTION, SOLUTION INTRAVENOUS at 10:40

## 2019-01-01 RX ADMIN — BACITRACIN: 500 OINTMENT OPHTHALMIC at 20:01

## 2019-01-01 RX ADMIN — POTASSIUM CHLORIDE 10 MEQ: 7.46 INJECTION, SOLUTION INTRAVENOUS at 15:37

## 2019-01-01 RX ADMIN — ATORVASTATIN CALCIUM 10 MG: 10 TABLET, FILM COATED ORAL at 20:24

## 2019-01-01 RX ADMIN — FINASTERIDE 5 MG: 5 TABLET, FILM COATED ORAL at 07:55

## 2019-01-01 RX ADMIN — Medication 10 ML: at 08:30

## 2019-01-01 RX ADMIN — TAMSULOSIN HYDROCHLORIDE 0.4 MG: 0.4 CAPSULE ORAL at 22:26

## 2019-01-01 RX ADMIN — AMLODIPINE BESYLATE 2.5 MG: 2.5 TABLET ORAL at 09:56

## 2019-01-01 RX ADMIN — AMPICILLIN SODIUM AND SULBACTAM SODIUM 3 G: 2; 1 INJECTION, POWDER, FOR SOLUTION INTRAMUSCULAR; INTRAVENOUS at 22:26

## 2019-01-01 RX ADMIN — FAMOTIDINE 20 MG: 20 TABLET ORAL at 21:26

## 2019-01-01 RX ADMIN — MANNITOL 25 G: 12.5 INJECTION, SOLUTION INTRAVENOUS at 17:48

## 2019-01-01 RX ADMIN — LEVETIRACETAM 500 MG: 500 TABLET, FILM COATED ORAL at 19:47

## 2019-01-01 RX ADMIN — DOCUSATE SODIUM 100 MG: 100 CAPSULE, LIQUID FILLED ORAL at 22:27

## 2019-01-01 RX ADMIN — BACITRACIN: 500 OINTMENT OPHTHALMIC at 15:01

## 2019-01-01 RX ADMIN — SODIUM CHLORIDE: 9 INJECTION, SOLUTION INTRAVENOUS at 05:55

## 2019-01-01 RX ADMIN — FINASTERIDE 5 MG: 5 TABLET, FILM COATED ORAL at 09:24

## 2019-01-01 RX ADMIN — FENTANYL CITRATE 50 MCG: 50 INJECTION INTRAMUSCULAR; INTRAVENOUS at 17:20

## 2019-01-01 RX ADMIN — ROPINIROLE HYDROCHLORIDE 0.5 MG: 0.5 TABLET, FILM COATED ORAL at 10:53

## 2019-01-01 RX ADMIN — SODIUM CHLORIDE: 9 INJECTION, SOLUTION INTRAVENOUS at 04:41

## 2019-01-01 RX ADMIN — BACITRACIN: 500 OINTMENT OPHTHALMIC at 09:28

## 2019-01-01 RX ADMIN — BACITRACIN: 500 OINTMENT OPHTHALMIC at 10:54

## 2019-01-01 RX ADMIN — HYDROCORTISONE: 1 CREAM TOPICAL at 00:39

## 2019-01-01 RX ADMIN — HYDROCORTISONE: 1 CREAM TOPICAL at 09:59

## 2019-01-01 RX ADMIN — LEVETIRACETAM 500 MG: 100 INJECTION, SOLUTION INTRAVENOUS at 20:01

## 2019-01-01 RX ADMIN — FAMOTIDINE 20 MG: 20 TABLET ORAL at 08:36

## 2019-01-01 RX ADMIN — ROPINIROLE HYDROCHLORIDE 0.5 MG: 0.5 TABLET, FILM COATED ORAL at 16:20

## 2019-01-01 RX ADMIN — ROPINIROLE HYDROCHLORIDE 0.5 MG: 0.5 TABLET, FILM COATED ORAL at 13:05

## 2019-01-01 RX ADMIN — ACETAMINOPHEN 650 MG: 325 TABLET ORAL at 01:18

## 2019-01-01 RX ADMIN — BACITRACIN: 500 OINTMENT OPHTHALMIC at 22:12

## 2019-01-01 RX ADMIN — Medication 100 MG: at 17:20

## 2019-01-01 RX ADMIN — LEUCINE, PHENYLALANINE, LYSINE, METHIONINE, ISOLEUCINE, VALINE, HISTIDINE, THREONINE, TRYPTOPHAN, ALANINE, GLYCINE, ARGININE, PROLINE, SERINE, TYROSINE, DEXTROSE 2000 ML: 311; 238; 247; 170; 255; 247; 204; 179; 77; 880; 438; 489; 289; 213; 17; 5 INJECTION INTRAVENOUS at 21:49

## 2019-01-01 RX ADMIN — BACITRACIN: 500 OINTMENT OPHTHALMIC at 22:32

## 2019-01-01 RX ADMIN — Medication 0.4 MG: at 17:42

## 2019-01-01 RX ADMIN — CEFAZOLIN SODIUM 1 G: 1 INJECTION, SOLUTION INTRAVENOUS at 09:52

## 2019-01-01 RX ADMIN — AMLODIPINE BESYLATE 2.5 MG: 2.5 TABLET ORAL at 10:54

## 2019-01-01 RX ADMIN — ROPINIROLE HYDROCHLORIDE 0.5 MG: 0.5 TABLET, FILM COATED ORAL at 22:34

## 2019-01-01 RX ADMIN — POTASSIUM CHLORIDE 10 MEQ: 7.46 INJECTION, SOLUTION INTRAVENOUS at 21:24

## 2019-01-01 RX ADMIN — MICONAZOLE NITRATE: 2 POWDER TOPICAL at 09:56

## 2019-01-01 RX ADMIN — ROPINIROLE HYDROCHLORIDE 0.5 MG: 0.5 TABLET, FILM COATED ORAL at 13:54

## 2019-01-01 RX ADMIN — BACITRACIN: 500 OINTMENT OPHTHALMIC at 11:53

## 2019-01-01 RX ADMIN — POTASSIUM CHLORIDE 10 MEQ: 7.46 INJECTION, SOLUTION INTRAVENOUS at 21:23

## 2019-01-01 RX ADMIN — PHENYLEPHRINE HYDROCHLORIDE 200 MCG: 10 INJECTION INTRAVENOUS at 17:29

## 2019-01-01 RX ADMIN — ACETAMINOPHEN 650 MG: 325 TABLET ORAL at 09:53

## 2019-01-01 RX ADMIN — DEXTROSE MONOHYDRATE: 50 INJECTION, SOLUTION INTRAVENOUS at 15:49

## 2019-01-01 RX ADMIN — CEFAZOLIN SODIUM 1 G: 1 INJECTION, SOLUTION INTRAVENOUS at 08:29

## 2019-01-01 RX ADMIN — ROPINIROLE HYDROCHLORIDE 0.5 MG: 0.5 TABLET, FILM COATED ORAL at 15:51

## 2019-01-01 RX ADMIN — POTASSIUM CHLORIDE 40 MEQ: 20 TABLET, EXTENDED RELEASE ORAL at 12:32

## 2019-01-01 RX ADMIN — AMPICILLIN SODIUM AND SULBACTAM SODIUM 3 G: 2; 1 INJECTION, POWDER, FOR SOLUTION INTRAMUSCULAR; INTRAVENOUS at 22:24

## 2019-01-01 RX ADMIN — SODIUM CHLORIDE: 9 INJECTION, SOLUTION INTRAVENOUS at 17:51

## 2019-01-01 RX ADMIN — LEVETIRACETAM 500 MG: 100 INJECTION, SOLUTION INTRAVENOUS at 10:06

## 2019-01-01 RX ADMIN — AMPICILLIN SODIUM AND SULBACTAM SODIUM 3 G: 2; 1 INJECTION, POWDER, FOR SOLUTION INTRAMUSCULAR; INTRAVENOUS at 03:05

## 2019-01-01 RX ADMIN — POTASSIUM CHLORIDE 10 MEQ: 7.46 INJECTION, SOLUTION INTRAVENOUS at 00:01

## 2019-01-01 RX ADMIN — MICONAZOLE NITRATE: 2 POWDER TOPICAL at 20:56

## 2019-01-01 RX ADMIN — BACITRACIN: 500 OINTMENT OPHTHALMIC at 13:54

## 2019-01-01 RX ADMIN — MORPHINE SULFATE 4 MG: 4 INJECTION INTRAVENOUS at 04:22

## 2019-01-01 RX ADMIN — DOCUSATE SODIUM 100 MG: 100 CAPSULE, LIQUID FILLED ORAL at 09:23

## 2019-01-01 RX ADMIN — CARBIDOPA AND LEVODOPA 1.5 TABLET: 25; 100 TABLET ORAL at 09:23

## 2019-01-01 RX ADMIN — ATORVASTATIN CALCIUM 10 MG: 10 TABLET, FILM COATED ORAL at 20:56

## 2019-01-01 RX ADMIN — MORPHINE SULFATE 2 MG: 2 INJECTION, SOLUTION INTRAMUSCULAR; INTRAVENOUS at 12:31

## 2019-01-01 RX ADMIN — SODIUM CHLORIDE: 9 INJECTION, SOLUTION INTRAVENOUS at 15:39

## 2019-01-01 RX ADMIN — CARBIDOPA AND LEVODOPA 1.5 TABLET: 25; 100 TABLET ORAL at 20:24

## 2019-01-01 RX ADMIN — CARBIDOPA AND LEVODOPA 1.5 TABLET: 25; 100 TABLET ORAL at 10:54

## 2019-01-01 RX ADMIN — BACITRACIN: 500 OINTMENT OPHTHALMIC at 08:15

## 2019-01-01 RX ADMIN — FAMOTIDINE 20 MG: 20 TABLET ORAL at 20:23

## 2019-01-01 RX ADMIN — ATORVASTATIN CALCIUM 10 MG: 10 TABLET, FILM COATED ORAL at 00:38

## 2019-01-01 RX ADMIN — CEFAZOLIN SODIUM 1 G: 1 INJECTION, SOLUTION INTRAVENOUS at 16:57

## 2019-01-01 RX ADMIN — ROPINIROLE HYDROCHLORIDE 0.5 MG: 0.5 TABLET, FILM COATED ORAL at 21:41

## 2019-01-01 RX ADMIN — VITAMIN D, TAB 1000IU (100/BT) 1000 UNITS: 25 TAB at 09:24

## 2019-01-01 RX ADMIN — BACITRACIN: 500 OINTMENT OPHTHALMIC at 21:25

## 2019-01-01 RX ADMIN — ACETAMINOPHEN 650 MG: 325 TABLET ORAL at 09:55

## 2019-01-01 RX ADMIN — CARBIDOPA AND LEVODOPA 1.5 TABLET: 25; 100 TABLET ORAL at 18:34

## 2019-01-01 RX ADMIN — DIPHENHYDRAMINE HYDROCHLORIDE 25 MG: 50 INJECTION, SOLUTION INTRAMUSCULAR; INTRAVENOUS at 22:18

## 2019-01-01 RX ADMIN — BACITRACIN: 500 OINTMENT OPHTHALMIC at 21:42

## 2019-01-01 RX ADMIN — FAMOTIDINE 20 MG: 20 TABLET ORAL at 07:55

## 2019-01-01 RX ADMIN — ROPINIROLE HYDROCHLORIDE 0.5 MG: 0.5 TABLET, FILM COATED ORAL at 08:34

## 2019-01-01 RX ADMIN — DOCUSATE SODIUM 100 MG: 100 CAPSULE, LIQUID FILLED ORAL at 09:36

## 2019-01-01 RX ADMIN — BACITRACIN: 500 OINTMENT OPHTHALMIC at 10:26

## 2019-01-01 RX ADMIN — ACETAMINOPHEN 650 MG: 325 TABLET ORAL at 09:23

## 2019-01-01 RX ADMIN — BACITRACIN: 500 OINTMENT OPHTHALMIC at 10:07

## 2019-01-01 RX ADMIN — SODIUM CHLORIDE: 9 INJECTION, SOLUTION INTRAVENOUS at 17:28

## 2019-01-01 RX ADMIN — CEFTRIAXONE SODIUM 1 G: 1 INJECTION, POWDER, FOR SOLUTION INTRAMUSCULAR; INTRAVENOUS at 10:44

## 2019-01-01 RX ADMIN — CALCIUM GLUCONATE 1 G: 98 INJECTION, SOLUTION INTRAVENOUS at 09:28

## 2019-01-01 RX ADMIN — DIPHENHYDRAMINE HYDROCHLORIDE 25 MG: 50 INJECTION, SOLUTION INTRAMUSCULAR; INTRAVENOUS at 08:23

## 2019-01-01 RX ADMIN — BACITRACIN: 500 OINTMENT OPHTHALMIC at 00:04

## 2019-01-01 RX ADMIN — BACITRACIN: 500 OINTMENT OPHTHALMIC at 16:18

## 2019-01-01 RX ADMIN — SODIUM CHLORIDE: 9 INJECTION, SOLUTION INTRAVENOUS at 16:59

## 2019-01-01 RX ADMIN — CEFTRIAXONE SODIUM 1 G: 1 INJECTION, POWDER, FOR SOLUTION INTRAMUSCULAR; INTRAVENOUS at 11:14

## 2019-01-01 RX ADMIN — LEVETIRACETAM 500 MG: 100 INJECTION, SOLUTION INTRAVENOUS at 21:40

## 2019-01-01 RX ADMIN — AMLODIPINE BESYLATE 2.5 MG: 2.5 TABLET ORAL at 09:32

## 2019-01-01 RX ADMIN — FAMOTIDINE 20 MG: 20 TABLET ORAL at 09:36

## 2019-01-01 RX ADMIN — ROPINIROLE HYDROCHLORIDE 0.5 MG: 0.5 TABLET, FILM COATED ORAL at 09:32

## 2019-01-01 RX ADMIN — ROPINIROLE HYDROCHLORIDE 0.5 MG: 0.5 TABLET, FILM COATED ORAL at 14:00

## 2019-01-01 RX ADMIN — CARBIDOPA AND LEVODOPA 1.5 TABLET: 25; 100 TABLET ORAL at 15:52

## 2019-01-01 RX ADMIN — LEVETIRACETAM 500 MG: 500 TABLET, FILM COATED ORAL at 00:38

## 2019-01-01 RX ADMIN — MORPHINE SULFATE 2 MG: 2 INJECTION, SOLUTION INTRAMUSCULAR; INTRAVENOUS at 16:26

## 2019-01-01 RX ADMIN — ROPINIROLE HYDROCHLORIDE 0.5 MG: 0.5 TABLET, FILM COATED ORAL at 16:02

## 2019-01-01 RX ADMIN — MICONAZOLE NITRATE: 2 POWDER TOPICAL at 11:36

## 2019-01-01 RX ADMIN — MORPHINE SULFATE 4 MG: 4 INJECTION INTRAVENOUS at 01:16

## 2019-01-01 RX ADMIN — ACETAMINOPHEN 650 MG: 325 TABLET ORAL at 10:14

## 2019-01-01 RX ADMIN — POTASSIUM PHOSPHATE, MONOBASIC AND POTASSIUM PHOSPHATE, DIBASIC 9 MMOL: 224; 236 INJECTION, SOLUTION, CONCENTRATE INTRAVENOUS at 21:37

## 2019-01-01 RX ADMIN — DIPHENHYDRAMINE HYDROCHLORIDE 25 MG: 50 INJECTION, SOLUTION INTRAMUSCULAR; INTRAVENOUS at 17:04

## 2019-01-01 RX ADMIN — ROPINIROLE HYDROCHLORIDE 0.5 MG: 0.5 TABLET, FILM COATED ORAL at 09:37

## 2019-01-01 RX ADMIN — LEVETIRACETAM 500 MG: 500 TABLET, FILM COATED ORAL at 07:55

## 2019-01-01 RX ADMIN — CEFTRIAXONE SODIUM 1 G: 1 INJECTION, POWDER, FOR SOLUTION INTRAMUSCULAR; INTRAVENOUS at 12:09

## 2019-01-01 RX ADMIN — TAMSULOSIN HYDROCHLORIDE 0.4 MG: 0.4 CAPSULE ORAL at 19:48

## 2019-01-01 RX ADMIN — BACITRACIN: 500 OINTMENT OPHTHALMIC at 14:00

## 2019-01-01 RX ADMIN — CARBIDOPA AND LEVODOPA 1.5 TABLET: 25; 100 TABLET ORAL at 15:15

## 2019-01-01 RX ADMIN — AMLODIPINE BESYLATE 2.5 MG: 2.5 TABLET ORAL at 09:23

## 2019-01-01 RX ADMIN — BACITRACIN: 500 OINTMENT OPHTHALMIC at 19:23

## 2019-01-01 RX ADMIN — BACITRACIN: 500 OINTMENT OPHTHALMIC at 20:53

## 2019-01-01 RX ADMIN — LEVETIRACETAM 500 MG: 100 INJECTION, SOLUTION INTRAVENOUS at 03:05

## 2019-01-01 RX ADMIN — SODIUM CHLORIDE: 9 INJECTION, SOLUTION INTRAVENOUS at 17:10

## 2019-01-01 RX ADMIN — SODIUM CHLORIDE: 9 INJECTION, SOLUTION INTRAVENOUS at 06:51

## 2019-01-01 RX ADMIN — BACITRACIN: 500 OINTMENT OPHTHALMIC at 09:37

## 2019-01-01 RX ADMIN — THERA TABS 1 TABLET: TAB at 09:56

## 2019-01-01 RX ADMIN — SODIUM CHLORIDE 1000 ML: 900 INJECTION, SOLUTION INTRAVENOUS at 10:50

## 2019-01-01 RX ADMIN — ROPINIROLE HYDROCHLORIDE 0.5 MG: 0.5 TABLET, FILM COATED ORAL at 15:22

## 2019-01-01 RX ADMIN — SODIUM CHLORIDE: 9 INJECTION, SOLUTION INTRAVENOUS at 06:28

## 2019-01-01 RX ADMIN — Medication 10 ML: at 08:15

## 2019-01-01 RX ADMIN — DOCUSATE SODIUM 100 MG: 100 CAPSULE, LIQUID FILLED ORAL at 08:44

## 2019-01-01 RX ADMIN — CARBIDOPA AND LEVODOPA 1.5 TABLET: 25; 100 TABLET ORAL at 15:14

## 2019-01-01 RX ADMIN — Medication 10 ML: at 20:32

## 2019-01-01 RX ADMIN — MICONAZOLE NITRATE: 2 POWDER TOPICAL at 21:28

## 2019-01-01 RX ADMIN — LEVETIRACETAM 500 MG: 100 INJECTION, SOLUTION INTRAVENOUS at 19:59

## 2019-01-01 RX ADMIN — CEFAZOLIN SODIUM 1 G: 1 INJECTION, SOLUTION INTRAVENOUS at 00:36

## 2019-01-01 RX ADMIN — SODIUM CHLORIDE: 9 INJECTION, SOLUTION INTRAVENOUS at 05:58

## 2019-01-01 RX ADMIN — MICONAZOLE NITRATE: 2 POWDER TOPICAL at 10:07

## 2019-01-01 RX ADMIN — BACITRACIN: 500 OINTMENT OPHTHALMIC at 19:47

## 2019-01-01 RX ADMIN — ROPINIROLE HYDROCHLORIDE 0.5 MG: 0.5 TABLET, FILM COATED ORAL at 20:42

## 2019-01-01 RX ADMIN — MICONAZOLE NITRATE: 2 POWDER TOPICAL at 07:56

## 2019-01-01 RX ADMIN — AMPICILLIN SODIUM AND SULBACTAM SODIUM 3 G: 2; 1 INJECTION, POWDER, FOR SOLUTION INTRAMUSCULAR; INTRAVENOUS at 03:49

## 2019-01-01 RX ADMIN — MICONAZOLE NITRATE: 2 POWDER TOPICAL at 21:25

## 2019-01-01 RX ADMIN — AMPICILLIN SODIUM AND SULBACTAM SODIUM 3 G: 2; 1 INJECTION, POWDER, FOR SOLUTION INTRAMUSCULAR; INTRAVENOUS at 03:59

## 2019-01-01 RX ADMIN — MORPHINE SULFATE 4 MG: 4 INJECTION INTRAVENOUS at 21:40

## 2019-01-01 RX ADMIN — DIPHENHYDRAMINE HYDROCHLORIDE 25 MG: 50 INJECTION, SOLUTION INTRAMUSCULAR; INTRAVENOUS at 02:42

## 2019-01-01 RX ADMIN — LEVETIRACETAM 500 MG: 100 INJECTION, SOLUTION INTRAVENOUS at 20:43

## 2019-01-01 RX ADMIN — DOCUSATE SODIUM 100 MG: 100 CAPSULE, LIQUID FILLED ORAL at 10:18

## 2019-01-01 RX ADMIN — BACITRACIN: 500 OINTMENT OPHTHALMIC at 21:57

## 2019-01-01 RX ADMIN — MORPHINE SULFATE 4 MG: 4 INJECTION INTRAVENOUS at 22:05

## 2019-01-01 RX ADMIN — ROPINIROLE HYDROCHLORIDE 0.5 MG: 0.5 TABLET, FILM COATED ORAL at 08:45

## 2019-01-01 RX ADMIN — TAMSULOSIN HYDROCHLORIDE 0.4 MG: 0.4 CAPSULE ORAL at 20:56

## 2019-01-01 RX ADMIN — POTASSIUM CHLORIDE 10 MEQ: 7.46 INJECTION, SOLUTION INTRAVENOUS at 17:22

## 2019-01-01 RX ADMIN — LEVETIRACETAM 500 MG: 100 INJECTION, SOLUTION INTRAVENOUS at 09:25

## 2019-01-01 RX ADMIN — TAMSULOSIN HYDROCHLORIDE 0.4 MG: 0.4 CAPSULE ORAL at 22:33

## 2019-01-01 RX ADMIN — MORPHINE SULFATE 4 MG: 4 INJECTION INTRAVENOUS at 01:18

## 2019-01-01 RX ADMIN — LEVETIRACETAM 500 MG: 500 TABLET, FILM COATED ORAL at 08:45

## 2019-01-01 RX ADMIN — PHENYLEPHRINE HYDROCHLORIDE 200 MCG: 10 INJECTION INTRAVENOUS at 18:47

## 2019-01-01 RX ADMIN — FINASTERIDE 5 MG: 5 TABLET, FILM COATED ORAL at 20:24

## 2019-01-01 RX ADMIN — DEXAMETHASONE SODIUM PHOSPHATE 4 MG: 4 INJECTION, SOLUTION INTRAMUSCULAR; INTRAVENOUS at 17:22

## 2019-01-01 RX ADMIN — DOCUSATE SODIUM 100 MG: 100 CAPSULE, LIQUID FILLED ORAL at 09:40

## 2019-01-01 RX ADMIN — LEUCINE, PHENYLALANINE, LYSINE, METHIONINE, ISOLEUCINE, VALINE, HISTIDINE, THREONINE, TRYPTOPHAN, ALANINE, GLYCINE, ARGININE, PROLINE, SERINE, TYROSINE, DEXTROSE 2000 ML: 311; 238; 247; 170; 255; 247; 204; 179; 77; 880; 438; 489; 289; 213; 17; 5 INJECTION INTRAVENOUS at 18:51

## 2019-01-01 RX ADMIN — LEVETIRACETAM 500 MG: 500 TABLET, FILM COATED ORAL at 09:56

## 2019-01-01 RX ADMIN — CARBIDOPA AND LEVODOPA 1.5 TABLET: 25; 100 TABLET ORAL at 06:22

## 2019-01-01 RX ADMIN — DOCUSATE SODIUM 100 MG: 100 CAPSULE, LIQUID FILLED ORAL at 21:42

## 2019-01-01 RX ADMIN — ACETAMINOPHEN 650 MG: 325 TABLET ORAL at 08:36

## 2019-01-01 RX ADMIN — SODIUM CHLORIDE: 9 INJECTION, SOLUTION INTRAVENOUS at 00:15

## 2019-01-01 RX ADMIN — ACETAMINOPHEN 650 MG: 650 SUPPOSITORY RECTAL at 16:38

## 2019-01-01 RX ADMIN — ROPINIROLE HYDROCHLORIDE 0.5 MG: 0.5 TABLET, FILM COATED ORAL at 21:56

## 2019-01-01 RX ADMIN — ACETAMINOPHEN 650 MG: 650 SUPPOSITORY RECTAL at 08:23

## 2019-01-01 RX ADMIN — CARBIDOPA AND LEVODOPA 1.5 TABLET: 25; 100 TABLET ORAL at 17:45

## 2019-01-01 RX ADMIN — CEFAZOLIN SODIUM 1 G: 1 INJECTION, SOLUTION INTRAVENOUS at 09:32

## 2019-01-01 RX ADMIN — AMLODIPINE BESYLATE 2.5 MG: 2.5 TABLET ORAL at 07:55

## 2019-01-01 RX ADMIN — FENTANYL CITRATE 50 MCG: 50 INJECTION INTRAMUSCULAR; INTRAVENOUS at 20:36

## 2019-01-01 RX ADMIN — AMLODIPINE BESYLATE 2.5 MG: 2.5 TABLET ORAL at 13:01

## 2019-01-01 RX ADMIN — BACITRACIN: 500 OINTMENT OPHTHALMIC at 07:56

## 2019-01-01 RX ADMIN — LEVETIRACETAM 500 MG: 100 INJECTION, SOLUTION INTRAVENOUS at 12:47

## 2019-01-01 RX ADMIN — MICONAZOLE NITRATE: 2 POWDER TOPICAL at 10:09

## 2019-01-01 RX ADMIN — BACITRACIN: 500 OINTMENT OPHTHALMIC at 20:25

## 2019-01-01 RX ADMIN — ROPINIROLE HYDROCHLORIDE 0.5 MG: 0.5 TABLET, FILM COATED ORAL at 22:26

## 2019-01-01 RX ADMIN — AMPICILLIN SODIUM AND SULBACTAM SODIUM 3 G: 2; 1 INJECTION, POWDER, FOR SOLUTION INTRAMUSCULAR; INTRAVENOUS at 12:37

## 2019-01-01 RX ADMIN — BACITRACIN: 500 OINTMENT OPHTHALMIC at 20:32

## 2019-01-01 RX ADMIN — MICONAZOLE NITRATE: 2 POWDER TOPICAL at 21:16

## 2019-01-01 RX ADMIN — DOCUSATE SODIUM 100 MG: 100 CAPSULE, LIQUID FILLED ORAL at 20:20

## 2019-01-01 RX ADMIN — Medication 10 ML: at 11:33

## 2019-01-01 RX ADMIN — CEFTRIAXONE SODIUM 1 G: 1 INJECTION, POWDER, FOR SOLUTION INTRAMUSCULAR; INTRAVENOUS at 00:40

## 2019-01-01 RX ADMIN — BACITRACIN: 500 OINTMENT OPHTHALMIC at 13:02

## 2019-01-01 RX ADMIN — ATORVASTATIN CALCIUM 10 MG: 10 TABLET, FILM COATED ORAL at 22:34

## 2019-01-01 RX ADMIN — FAMOTIDINE 20 MG: 20 TABLET ORAL at 20:55

## 2019-01-01 RX ADMIN — CALCIUM GLUCONATE 1 G: 98 INJECTION, SOLUTION INTRAVENOUS at 10:26

## 2019-01-01 RX ADMIN — LEVETIRACETAM 500 MG: 500 TABLET, FILM COATED ORAL at 20:24

## 2019-01-01 RX ADMIN — WATER 1.2 ML: 1 INJECTION INTRAMUSCULAR; INTRAVENOUS; SUBCUTANEOUS at 23:15

## 2019-01-01 RX ADMIN — ROPINIROLE HYDROCHLORIDE 0.5 MG: 0.5 TABLET, FILM COATED ORAL at 20:32

## 2019-01-01 RX ADMIN — FAMOTIDINE 20 MG: 20 TABLET ORAL at 22:26

## 2019-01-01 RX ADMIN — LEVETIRACETAM 500 MG: 100 INJECTION, SOLUTION INTRAVENOUS at 20:41

## 2019-01-01 RX ADMIN — BACITRACIN: 500 OINTMENT OPHTHALMIC at 09:42

## 2019-01-01 RX ADMIN — CARBIDOPA AND LEVODOPA 1.5 TABLET: 25; 100 TABLET ORAL at 21:56

## 2019-01-01 RX ADMIN — FAMOTIDINE 20 MG: 20 TABLET ORAL at 09:24

## 2019-01-01 RX ADMIN — MICONAZOLE NITRATE: 2 POWDER TOPICAL at 09:57

## 2019-01-01 RX ADMIN — CEFAZOLIN SODIUM 1 G: 1 INJECTION, SOLUTION INTRAVENOUS at 23:33

## 2019-01-01 RX ADMIN — BACITRACIN: 500 OINTMENT OPHTHALMIC at 22:37

## 2019-01-01 RX ADMIN — CEFTRIAXONE SODIUM 1 G: 1 INJECTION, POWDER, FOR SOLUTION INTRAMUSCULAR; INTRAVENOUS at 21:16

## 2019-01-01 RX ADMIN — MICONAZOLE NITRATE: 2 POWDER TOPICAL at 20:01

## 2019-01-01 RX ADMIN — POTASSIUM CHLORIDE 10 MEQ: 7.46 INJECTION, SOLUTION INTRAVENOUS at 14:29

## 2019-01-01 RX ADMIN — BACITRACIN: 500 OINTMENT OPHTHALMIC at 10:36

## 2019-01-01 RX ADMIN — DOCUSATE SODIUM 100 MG: 100 CAPSULE, LIQUID FILLED ORAL at 10:54

## 2019-01-01 RX ADMIN — FAMOTIDINE 20 MG: 20 TABLET ORAL at 10:17

## 2019-01-01 RX ADMIN — FINASTERIDE 5 MG: 5 TABLET, FILM COATED ORAL at 09:59

## 2019-01-01 RX ADMIN — LEVETIRACETAM 500 MG: 100 INJECTION, SOLUTION INTRAVENOUS at 11:18

## 2019-01-01 RX ADMIN — BACITRACIN: 500 OINTMENT OPHTHALMIC at 15:47

## 2019-01-01 RX ADMIN — SODIUM CHLORIDE: 9 INJECTION, SOLUTION INTRAVENOUS at 20:40

## 2019-01-01 RX ADMIN — BACITRACIN: 500 OINTMENT OPHTHALMIC at 10:19

## 2019-01-01 RX ADMIN — CARBIDOPA AND LEVODOPA 1.5 TABLET: 25; 100 TABLET ORAL at 17:39

## 2019-01-01 RX ADMIN — AMPICILLIN SODIUM AND SULBACTAM SODIUM 3 G: 2; 1 INJECTION, POWDER, FOR SOLUTION INTRAMUSCULAR; INTRAVENOUS at 21:19

## 2019-01-01 RX ADMIN — SODIUM CHLORIDE 250 ML: 9 INJECTION, SOLUTION INTRAVENOUS at 16:48

## 2019-01-01 RX ADMIN — ROPINIROLE HYDROCHLORIDE 0.5 MG: 0.5 TABLET, FILM COATED ORAL at 12:32

## 2019-01-01 RX ADMIN — VITAMIN D, TAB 1000IU (100/BT) 1000 UNITS: 25 TAB at 10:00

## 2019-01-01 RX ADMIN — ROCURONIUM BROMIDE 45 MG: 10 INJECTION INTRAVENOUS at 17:25

## 2019-01-01 RX ADMIN — FENTANYL CITRATE 50 MCG: 50 INJECTION INTRAMUSCULAR; INTRAVENOUS at 18:05

## 2019-01-01 RX ADMIN — FAMOTIDINE 20 MG: 20 TABLET ORAL at 21:56

## 2019-01-01 RX ADMIN — CARBIDOPA AND LEVODOPA 1.5 TABLET: 25; 100 TABLET ORAL at 13:04

## 2019-01-01 RX ADMIN — LEVETIRACETAM 1000 MG: 100 INJECTION, SOLUTION INTRAVENOUS at 10:10

## 2019-01-01 RX ADMIN — DIPHENHYDRAMINE HYDROCHLORIDE 25 MG: 50 INJECTION, SOLUTION INTRAMUSCULAR; INTRAVENOUS at 22:37

## 2019-01-01 RX ADMIN — DIPHENHYDRAMINE HYDROCHLORIDE 25 MG: 50 INJECTION, SOLUTION INTRAMUSCULAR; INTRAVENOUS at 23:28

## 2019-01-01 RX ADMIN — BACITRACIN: 500 OINTMENT OPHTHALMIC at 22:33

## 2019-01-01 RX ADMIN — MICONAZOLE NITRATE: 2 POWDER TOPICAL at 19:47

## 2019-01-01 RX ADMIN — ACETAMINOPHEN 650 MG: 325 TABLET ORAL at 13:51

## 2019-01-01 RX ADMIN — BACITRACIN: 500 OINTMENT OPHTHALMIC at 00:38

## 2019-01-01 RX ADMIN — CALCIUM GLUCONATE 1 G: 98 INJECTION, SOLUTION INTRAVENOUS at 11:28

## 2019-01-01 RX ADMIN — ACETAMINOPHEN 650 MG: 650 SUPPOSITORY RECTAL at 01:39

## 2019-01-01 RX ADMIN — CARBIDOPA AND LEVODOPA 1.5 TABLET: 25; 100 TABLET ORAL at 08:44

## 2019-01-01 RX ADMIN — TAMSULOSIN HYDROCHLORIDE 0.4 MG: 0.4 CAPSULE ORAL at 20:24

## 2019-01-01 RX ADMIN — SODIUM CHLORIDE: 9 INJECTION, SOLUTION INTRAVENOUS at 17:30

## 2019-01-01 RX ADMIN — BACITRACIN: 500 OINTMENT OPHTHALMIC at 13:08

## 2019-01-01 RX ADMIN — MICONAZOLE NITRATE: 2 POWDER TOPICAL at 20:25

## 2019-01-01 RX ADMIN — CARBIDOPA AND LEVODOPA 1.5 TABLET: 25; 100 TABLET ORAL at 22:33

## 2019-01-01 RX ADMIN — DOCUSATE SODIUM 100 MG: 100 CAPSULE, LIQUID FILLED ORAL at 20:42

## 2019-01-01 RX ADMIN — CARBIDOPA AND LEVODOPA 1.5 TABLET: 25; 100 TABLET ORAL at 14:00

## 2019-01-01 RX ADMIN — LEVETIRACETAM 500 MG: 100 INJECTION, SOLUTION INTRAVENOUS at 21:24

## 2019-01-01 RX ADMIN — BACITRACIN: 500 OINTMENT OPHTHALMIC at 08:45

## 2019-01-01 RX ADMIN — POTASSIUM CHLORIDE 10 MEQ: 7.46 INJECTION, SOLUTION INTRAVENOUS at 10:25

## 2019-01-01 RX ADMIN — BACITRACIN: 500 OINTMENT OPHTHALMIC at 13:26

## 2019-01-01 RX ADMIN — DOCUSATE SODIUM 100 MG: 100 CAPSULE, LIQUID FILLED ORAL at 21:26

## 2019-01-01 RX ADMIN — BACITRACIN: 500 OINTMENT OPHTHALMIC at 16:03

## 2019-01-01 RX ADMIN — ZIPRASIDONE MESYLATE 10 MG: 20 INJECTION, POWDER, LYOPHILIZED, FOR SOLUTION INTRAMUSCULAR at 17:36

## 2019-01-01 RX ADMIN — SODIUM CHLORIDE: 9 INJECTION, SOLUTION INTRAVENOUS at 14:43

## 2019-01-01 RX ADMIN — PHENYLEPHRINE HYDROCHLORIDE 200 MCG: 10 INJECTION INTRAVENOUS at 18:29

## 2019-01-01 RX ADMIN — BACITRACIN: 500 OINTMENT OPHTHALMIC at 16:59

## 2019-01-01 RX ADMIN — CARBIDOPA AND LEVODOPA 1.5 TABLET: 25; 100 TABLET ORAL at 09:57

## 2019-01-01 RX ADMIN — SODIUM CHLORIDE: 9 INJECTION, SOLUTION INTRAVENOUS at 02:02

## 2019-01-01 RX ADMIN — THERA TABS 1 TABLET: TAB at 13:54

## 2019-01-01 RX ADMIN — MICONAZOLE NITRATE: 2 POWDER TOPICAL at 09:31

## 2019-01-01 RX ADMIN — BACITRACIN: 500 OINTMENT OPHTHALMIC at 12:33

## 2019-01-01 RX ADMIN — ATORVASTATIN CALCIUM 10 MG: 10 TABLET, FILM COATED ORAL at 22:33

## 2019-01-01 RX ADMIN — BACITRACIN: 500 OINTMENT OPHTHALMIC at 20:41

## 2019-01-01 RX ADMIN — CARBIDOPA AND LEVODOPA 1.5 TABLET: 25; 100 TABLET ORAL at 21:41

## 2019-01-01 RX ADMIN — CARBIDOPA AND LEVODOPA 1.5 TABLET: 25; 100 TABLET ORAL at 16:20

## 2019-01-01 RX ADMIN — LEVETIRACETAM 500 MG: 500 TABLET, FILM COATED ORAL at 20:32

## 2019-01-01 RX ADMIN — LEVETIRACETAM 500 MG: 100 INJECTION, SOLUTION INTRAVENOUS at 15:08

## 2019-01-01 RX ADMIN — LEVETIRACETAM 500 MG: 500 TABLET, FILM COATED ORAL at 09:24

## 2019-01-01 RX ADMIN — CARBIDOPA AND LEVODOPA 1.5 TABLET: 25; 100 TABLET ORAL at 05:48

## 2019-01-01 RX ADMIN — FAMOTIDINE 20 MG: 20 TABLET ORAL at 08:44

## 2019-01-01 RX ADMIN — ATORVASTATIN CALCIUM 10 MG: 10 TABLET, FILM COATED ORAL at 20:32

## 2019-01-01 RX ADMIN — LEVETIRACETAM 500 MG: 100 INJECTION, SOLUTION INTRAVENOUS at 10:27

## 2019-01-01 RX ADMIN — LEVETIRACETAM 500 MG: 100 INJECTION, SOLUTION INTRAVENOUS at 02:53

## 2019-01-01 RX ADMIN — CARBIDOPA AND LEVODOPA 1.5 TABLET: 25; 100 TABLET ORAL at 06:07

## 2019-01-01 RX ADMIN — Medication 10 ML: at 20:08

## 2019-01-01 RX ADMIN — CEFAZOLIN SODIUM 1 G: 1 INJECTION, SOLUTION INTRAVENOUS at 15:03

## 2019-01-01 RX ADMIN — ROPINIROLE HYDROCHLORIDE 0.5 MG: 0.5 TABLET, FILM COATED ORAL at 15:14

## 2019-01-01 RX ADMIN — FAMOTIDINE 20 MG: 20 TABLET ORAL at 20:20

## 2019-01-01 RX ADMIN — HYDROCORTISONE: 1 CREAM TOPICAL at 11:54

## 2019-01-01 RX ADMIN — LEUCINE, PHENYLALANINE, LYSINE, METHIONINE, ISOLEUCINE, VALINE, HISTIDINE, THREONINE, TRYPTOPHAN, ALANINE, GLYCINE, ARGININE, PROLINE, SERINE, TYROSINE, DEXTROSE 2000 ML: 311; 238; 247; 170; 255; 247; 204; 179; 77; 880; 438; 489; 289; 213; 17; 5 INJECTION INTRAVENOUS at 01:00

## 2019-01-01 RX ADMIN — BARIUM SULFATE 20 ML: 400 PASTE ORAL at 11:50

## 2019-01-01 RX ADMIN — ROPINIROLE HYDROCHLORIDE 0.5 MG: 0.5 TABLET, FILM COATED ORAL at 09:23

## 2019-01-01 RX ADMIN — CARBIDOPA AND LEVODOPA 1.5 TABLET: 25; 100 TABLET ORAL at 21:26

## 2019-01-01 RX ADMIN — CEFAZOLIN SODIUM 1 G: 1 INJECTION, SOLUTION INTRAVENOUS at 16:08

## 2019-01-01 RX ADMIN — SODIUM CHLORIDE: 9 INJECTION, SOLUTION INTRAVENOUS at 10:15

## 2019-01-01 RX ADMIN — POTASSIUM CHLORIDE 10 MEQ: 7.46 INJECTION, SOLUTION INTRAVENOUS at 15:47

## 2019-01-01 RX ADMIN — CEFAZOLIN SODIUM 1 G: 1 INJECTION, SOLUTION INTRAVENOUS at 09:37

## 2019-01-01 RX ADMIN — FAMOTIDINE 20 MG: 20 TABLET ORAL at 09:40

## 2019-01-01 RX ADMIN — PHENYLEPHRINE HYDROCHLORIDE 200 MCG: 10 INJECTION INTRAVENOUS at 17:37

## 2019-01-01 RX ADMIN — CARBIDOPA AND LEVODOPA 1.5 TABLET: 25; 100 TABLET ORAL at 16:02

## 2019-01-01 RX ADMIN — BACITRACIN: 500 OINTMENT OPHTHALMIC at 14:27

## 2019-01-01 RX ADMIN — BACITRACIN: 500 OINTMENT OPHTHALMIC at 21:26

## 2019-01-01 RX ADMIN — THERA TABS 1 TABLET: TAB at 07:55

## 2019-01-01 RX ADMIN — FAMOTIDINE 20 MG: 20 TABLET ORAL at 09:32

## 2019-01-01 RX ADMIN — LEVETIRACETAM 500 MG: 100 INJECTION, SOLUTION INTRAVENOUS at 21:31

## 2019-01-01 RX ADMIN — ATORVASTATIN CALCIUM 10 MG: 10 TABLET, FILM COATED ORAL at 21:42

## 2019-01-01 RX ADMIN — POTASSIUM CHLORIDE 20 MEQ: 20 TABLET, EXTENDED RELEASE ORAL at 12:32

## 2019-01-01 RX ADMIN — CEFAZOLIN SODIUM 1 G: 1 INJECTION, SOLUTION INTRAVENOUS at 00:19

## 2019-01-01 RX ADMIN — POTASSIUM CHLORIDE 10 MEQ: 7.46 INJECTION, SOLUTION INTRAVENOUS at 19:20

## 2019-01-01 RX ADMIN — FAMOTIDINE 20 MG: 20 TABLET ORAL at 00:38

## 2019-01-01 RX ADMIN — FAMOTIDINE 20 MG: 20 TABLET ORAL at 10:54

## 2019-01-01 RX ADMIN — ROPINIROLE HYDROCHLORIDE 0.5 MG: 0.5 TABLET, FILM COATED ORAL at 14:35

## 2019-01-01 RX ADMIN — CARBIDOPA AND LEVODOPA 1.5 TABLET: 25; 100 TABLET ORAL at 12:31

## 2019-01-01 RX ADMIN — CEFTRIAXONE SODIUM 1 G: 1 INJECTION, POWDER, FOR SOLUTION INTRAMUSCULAR; INTRAVENOUS at 21:21

## 2019-01-01 RX ADMIN — BACITRACIN: 500 OINTMENT OPHTHALMIC at 09:57

## 2019-01-01 RX ADMIN — ACETAMINOPHEN 650 MG: 650 SUPPOSITORY RECTAL at 10:18

## 2019-01-01 RX ADMIN — CEFAZOLIN 2000 MG: 1 INJECTION, POWDER, FOR SOLUTION INTRAMUSCULAR; INTRAVENOUS; PARENTERAL at 17:25

## 2019-01-01 RX ADMIN — Medication 10 ML: at 22:34

## 2019-01-01 RX ADMIN — TAMSULOSIN HYDROCHLORIDE 0.4 MG: 0.4 CAPSULE ORAL at 21:41

## 2019-01-01 RX ADMIN — FAMOTIDINE: 10 INJECTION, SOLUTION INTRAVENOUS at 18:10

## 2019-01-01 RX ADMIN — FAMOTIDINE 20 MG: 20 TABLET ORAL at 21:42

## 2019-01-01 RX ADMIN — FAMOTIDINE 20 MG: 20 TABLET ORAL at 20:42

## 2019-01-01 RX ADMIN — TRAZODONE HYDROCHLORIDE 75 MG: 50 TABLET ORAL at 20:56

## 2019-01-01 RX ADMIN — ROCURONIUM BROMIDE 30 MG: 10 INJECTION INTRAVENOUS at 18:48

## 2019-01-01 RX ADMIN — ROPINIROLE HYDROCHLORIDE 0.5 MG: 0.5 TABLET, FILM COATED ORAL at 17:46

## 2019-01-01 RX ADMIN — LEVETIRACETAM 500 MG: 100 INJECTION, SOLUTION INTRAVENOUS at 02:23

## 2019-01-01 RX ADMIN — ACETAMINOPHEN 650 MG: 325 TABLET ORAL at 14:37

## 2019-01-01 RX ADMIN — PHENYLEPHRINE HYDROCHLORIDE 100 MCG: 10 INJECTION INTRAVENOUS at 18:56

## 2019-01-01 RX ADMIN — Medication 10 ML: at 11:55

## 2019-01-01 RX ADMIN — CARBIDOPA AND LEVODOPA 1.5 TABLET: 25; 100 TABLET ORAL at 14:35

## 2019-01-01 RX ADMIN — AMLODIPINE BESYLATE 2.5 MG: 2.5 TABLET ORAL at 09:37

## 2019-01-01 RX ADMIN — Medication 10 ML: at 10:27

## 2019-01-01 RX ADMIN — CEFAZOLIN SODIUM 1 G: 1 INJECTION, SOLUTION INTRAVENOUS at 23:08

## 2019-01-01 RX ADMIN — ROPINIROLE HYDROCHLORIDE 0.5 MG: 0.5 TABLET, FILM COATED ORAL at 09:40

## 2019-01-01 RX ADMIN — POTASSIUM CHLORIDE 10 MEQ: 7.46 INJECTION, SOLUTION INTRAVENOUS at 19:11

## 2019-01-01 RX ADMIN — AMLODIPINE BESYLATE 2.5 MG: 2.5 TABLET ORAL at 08:44

## 2019-01-01 RX ADMIN — CEFTRIAXONE SODIUM 1 G: 1 INJECTION, POWDER, FOR SOLUTION INTRAMUSCULAR; INTRAVENOUS at 00:13

## 2019-01-01 RX ADMIN — CARBIDOPA AND LEVODOPA 1.5 TABLET: 25; 100 TABLET ORAL at 10:13

## 2019-01-01 RX ADMIN — CARBIDOPA AND LEVODOPA 1.5 TABLET: 25; 100 TABLET ORAL at 14:34

## 2019-01-01 RX ADMIN — FAMOTIDINE 20 MG: 20 TABLET ORAL at 09:53

## 2019-01-01 RX ADMIN — FAMOTIDINE: 10 INJECTION, SOLUTION INTRAVENOUS at 00:40

## 2019-01-01 RX ADMIN — CEFTRIAXONE SODIUM 1 G: 1 INJECTION, POWDER, FOR SOLUTION INTRAMUSCULAR; INTRAVENOUS at 10:32

## 2019-01-01 RX ADMIN — TAMSULOSIN HYDROCHLORIDE 0.4 MG: 0.4 CAPSULE ORAL at 00:38

## 2019-01-01 RX ADMIN — ROCURONIUM BROMIDE 5 MG: 10 INJECTION INTRAVENOUS at 17:20

## 2019-01-01 RX ADMIN — THERA TABS 1 TABLET: TAB at 09:24

## 2019-01-01 RX ADMIN — BACITRACIN: 500 OINTMENT OPHTHALMIC at 15:35

## 2019-01-01 RX ADMIN — ROPINIROLE HYDROCHLORIDE 0.5 MG: 0.5 TABLET, FILM COATED ORAL at 09:56

## 2019-01-01 RX ADMIN — ATORVASTATIN CALCIUM 10 MG: 10 TABLET, FILM COATED ORAL at 21:27

## 2019-01-01 RX ADMIN — BACITRACIN: 500 OINTMENT OPHTHALMIC at 21:21

## 2019-01-01 RX ADMIN — CARBIDOPA AND LEVODOPA 1.5 TABLET: 25; 100 TABLET ORAL at 22:27

## 2019-01-01 RX ADMIN — POTASSIUM CHLORIDE 10 MEQ: 7.46 INJECTION, SOLUTION INTRAVENOUS at 20:23

## 2019-01-01 RX ADMIN — ROCURONIUM BROMIDE 20 MG: 10 INJECTION INTRAVENOUS at 17:54

## 2019-01-01 RX ADMIN — MICONAZOLE NITRATE: 2 POWDER TOPICAL at 00:38

## 2019-01-01 RX ADMIN — SODIUM CHLORIDE: 9 INJECTION, SOLUTION INTRAVENOUS at 21:49

## 2019-01-01 RX ADMIN — DIPHENHYDRAMINE HYDROCHLORIDE 25 MG: 50 INJECTION, SOLUTION INTRAMUSCULAR; INTRAVENOUS at 16:38

## 2019-01-01 RX ADMIN — PHENYLEPHRINE HYDROCHLORIDE 100 MCG: 10 INJECTION INTRAVENOUS at 19:05

## 2019-01-01 RX ADMIN — ONDANSETRON 4 MG: 2 INJECTION INTRAMUSCULAR; INTRAVENOUS at 11:17

## 2019-01-01 RX ADMIN — TAMSULOSIN HYDROCHLORIDE 0.4 MG: 0.4 CAPSULE ORAL at 20:32

## 2019-01-01 RX ADMIN — Medication 10 ML: at 08:34

## 2019-01-01 RX ADMIN — FAMOTIDINE 20 MG: 20 TABLET ORAL at 20:32

## 2019-01-01 RX ADMIN — AMLODIPINE BESYLATE 2.5 MG: 2.5 TABLET ORAL at 09:40

## 2019-01-01 RX ADMIN — CEFAZOLIN SODIUM 1 G: 1 INJECTION, SOLUTION INTRAVENOUS at 23:43

## 2019-01-01 RX ADMIN — ROPINIROLE HYDROCHLORIDE 0.5 MG: 0.5 TABLET, FILM COATED ORAL at 06:22

## 2019-01-01 RX ADMIN — CARBIDOPA AND LEVODOPA 1.5 TABLET: 25; 100 TABLET ORAL at 08:34

## 2019-01-01 RX ADMIN — CEFAZOLIN SODIUM 1 G: 1 INJECTION, SOLUTION INTRAVENOUS at 08:14

## 2019-01-01 RX ADMIN — DIPHENHYDRAMINE HYDROCHLORIDE 25 MG: 50 INJECTION, SOLUTION INTRAMUSCULAR; INTRAVENOUS at 00:05

## 2019-01-01 RX ADMIN — MAGNESIUM HYDROXIDE 30 ML: 400 SUSPENSION ORAL at 09:24

## 2019-01-01 RX ADMIN — ROPINIROLE HYDROCHLORIDE 0.5 MG: 0.5 TABLET, FILM COATED ORAL at 17:40

## 2019-01-01 RX ADMIN — ZIPRASIDONE MESYLATE 10 MG: 20 INJECTION, POWDER, LYOPHILIZED, FOR SOLUTION INTRAMUSCULAR at 23:15

## 2019-01-01 RX ADMIN — LEVETIRACETAM 500 MG: 100 INJECTION, SOLUTION INTRAVENOUS at 09:54

## 2019-01-01 RX ADMIN — ROPINIROLE HYDROCHLORIDE 0.5 MG: 0.5 TABLET, FILM COATED ORAL at 05:48

## 2019-01-01 RX ADMIN — ROPINIROLE HYDROCHLORIDE 0.5 MG: 0.5 TABLET, FILM COATED ORAL at 18:34

## 2019-01-01 RX ADMIN — ROPINIROLE HYDROCHLORIDE 0.5 MG: 0.5 TABLET, FILM COATED ORAL at 20:24

## 2019-01-01 RX ADMIN — BACITRACIN: 500 OINTMENT OPHTHALMIC at 10:50

## 2019-01-01 RX ADMIN — CEFAZOLIN SODIUM 1 G: 1 INJECTION, SOLUTION INTRAVENOUS at 00:23

## 2019-01-01 RX ADMIN — MICONAZOLE NITRATE: 2 POWDER TOPICAL at 10:36

## 2019-01-01 RX ADMIN — DEXMEDETOMIDINE 0.2 MCG/KG/HR: 100 INJECTION, SOLUTION, CONCENTRATE INTRAVENOUS at 20:40

## 2019-01-01 RX ADMIN — POTASSIUM CHLORIDE 10 MEQ: 7.46 INJECTION, SOLUTION INTRAVENOUS at 13:01

## 2019-01-01 RX ADMIN — BACITRACIN: 500 OINTMENT OPHTHALMIC at 15:12

## 2019-01-01 RX ADMIN — CARBIDOPA AND LEVODOPA 1.5 TABLET: 25; 100 TABLET ORAL at 09:37

## 2019-01-01 RX ADMIN — SODIUM CHLORIDE: 9 INJECTION, SOLUTION INTRAVENOUS at 17:42

## 2019-01-01 ASSESSMENT — PAIN DESCRIPTION - ORIENTATION
ORIENTATION: LEFT
ORIENTATION: POSTERIOR
ORIENTATION: LEFT

## 2019-01-01 ASSESSMENT — PAIN DESCRIPTION - ONSET
ONSET: ON-GOING

## 2019-01-01 ASSESSMENT — PULMONARY FUNCTION TESTS
PIF_VALUE: 15
PIF_VALUE: 16
PIF_VALUE: 15
PIF_VALUE: 15
PIF_VALUE: 16
PIF_VALUE: 1
PIF_VALUE: 15
PIF_VALUE: 15
PIF_VALUE: 1
PIF_VALUE: 15
PIF_VALUE: 0
PIF_VALUE: 16
PIF_VALUE: 15
PIF_VALUE: 16
PIF_VALUE: 15
PIF_VALUE: 15
PIF_VALUE: 16
PIF_VALUE: 15
PIF_VALUE: 15
PIF_VALUE: 16
PIF_VALUE: 0
PIF_VALUE: 15
PIF_VALUE: 0
PIF_VALUE: 15
PIF_VALUE: 15
PIF_VALUE: 16
PIF_VALUE: 15
PIF_VALUE: 15
PIF_VALUE: 16
PIF_VALUE: 16
PIF_VALUE: 2
PIF_VALUE: 15
PIF_VALUE: 0
PIF_VALUE: 15
PIF_VALUE: 16
PIF_VALUE: 15
PIF_VALUE: 15
PIF_VALUE: 16
PIF_VALUE: 15
PIF_VALUE: 16
PIF_VALUE: 16
PIF_VALUE: 15
PIF_VALUE: 16
PIF_VALUE: 15
PIF_VALUE: 16
PIF_VALUE: 15
PIF_VALUE: 16
PIF_VALUE: 15
PIF_VALUE: 16
PIF_VALUE: 15
PIF_VALUE: 1
PIF_VALUE: 16
PIF_VALUE: 16
PIF_VALUE: 15
PIF_VALUE: 15
PIF_VALUE: 16
PIF_VALUE: 15
PIF_VALUE: 16
PIF_VALUE: 16
PIF_VALUE: 0
PIF_VALUE: 1
PIF_VALUE: 15
PIF_VALUE: 2
PIF_VALUE: 16
PIF_VALUE: 0
PIF_VALUE: 16
PIF_VALUE: 15
PIF_VALUE: 16
PIF_VALUE: 15
PIF_VALUE: 0
PIF_VALUE: 15
PIF_VALUE: 16
PIF_VALUE: 15
PIF_VALUE: 16
PIF_VALUE: 19
PIF_VALUE: 15
PIF_VALUE: 5
PIF_VALUE: 17
PIF_VALUE: 15
PIF_VALUE: 15
PIF_VALUE: 0
PIF_VALUE: 15
PIF_VALUE: 15
PIF_VALUE: 16
PIF_VALUE: 15
PIF_VALUE: 16
PIF_VALUE: 15
PIF_VALUE: 0
PIF_VALUE: 15
PIF_VALUE: 14
PIF_VALUE: 15
PIF_VALUE: 16
PIF_VALUE: 15
PIF_VALUE: 17
PIF_VALUE: 16
PIF_VALUE: 15
PIF_VALUE: 0
PIF_VALUE: 16
PIF_VALUE: 15
PIF_VALUE: 16
PIF_VALUE: 1
PIF_VALUE: 15
PIF_VALUE: 14
PIF_VALUE: 16
PIF_VALUE: 15
PIF_VALUE: 2
PIF_VALUE: 0
PIF_VALUE: 16
PIF_VALUE: 15
PIF_VALUE: 15
PIF_VALUE: 2
PIF_VALUE: 15
PIF_VALUE: 16
PIF_VALUE: 16
PIF_VALUE: 15
PIF_VALUE: 16

## 2019-01-01 ASSESSMENT — PAIN SCALES - GENERAL
PAINLEVEL_OUTOF10: 0
PAINLEVEL_OUTOF10: 2
PAINLEVEL_OUTOF10: 0
PAINLEVEL_OUTOF10: 2
PAINLEVEL_OUTOF10: 0
PAINLEVEL_OUTOF10: 7
PAINLEVEL_OUTOF10: 7
PAINLEVEL_OUTOF10: 0
PAINLEVEL_OUTOF10: 4
PAINLEVEL_OUTOF10: 0
PAINLEVEL_OUTOF10: 0
PAINLEVEL_OUTOF10: 2
PAINLEVEL_OUTOF10: 0
PAINLEVEL_OUTOF10: 6
PAINLEVEL_OUTOF10: 0
PAINLEVEL_OUTOF10: 7
PAINLEVEL_OUTOF10: 3
PAINLEVEL_OUTOF10: 10
PAINLEVEL_OUTOF10: 0
PAINLEVEL_OUTOF10: 0
PAINLEVEL_OUTOF10: 3
PAINLEVEL_OUTOF10: 0
PAINLEVEL_OUTOF10: 7
PAINLEVEL_OUTOF10: 0
PAINLEVEL_OUTOF10: 2
PAINLEVEL_OUTOF10: 5
PAINLEVEL_OUTOF10: 3
PAINLEVEL_OUTOF10: 4
PAINLEVEL_OUTOF10: 7
PAINLEVEL_OUTOF10: 2
PAINLEVEL_OUTOF10: 2
PAINLEVEL_OUTOF10: 0
PAINLEVEL_OUTOF10: 7
PAINLEVEL_OUTOF10: 0
PAINLEVEL_OUTOF10: 0
PAINLEVEL_OUTOF10: 7
PAINLEVEL_OUTOF10: 5
PAINLEVEL_OUTOF10: 0
PAINLEVEL_OUTOF10: 0
PAINLEVEL_OUTOF10: 3
PAINLEVEL_OUTOF10: 0
PAINLEVEL_OUTOF10: 0
PAINLEVEL_OUTOF10: 7
PAINLEVEL_OUTOF10: 3
PAINLEVEL_OUTOF10: 0
PAINLEVEL_OUTOF10: 3
PAINLEVEL_OUTOF10: 0
PAINLEVEL_OUTOF10: 2
PAINLEVEL_OUTOF10: 2
PAINLEVEL_OUTOF10: 0
PAINLEVEL_OUTOF10: 7
PAINLEVEL_OUTOF10: 0
PAINLEVEL_OUTOF10: 5
PAINLEVEL_OUTOF10: 0
PAINLEVEL_OUTOF10: 0
PAINLEVEL_OUTOF10: 4
PAINLEVEL_OUTOF10: 7

## 2019-01-01 ASSESSMENT — PATIENT HEALTH QUESTIONNAIRE - PHQ9
2. FEELING DOWN, DEPRESSED OR HOPELESS: 0
SUM OF ALL RESPONSES TO PHQ9 QUESTIONS 1 & 2: 0
1. LITTLE INTEREST OR PLEASURE IN DOING THINGS: 0
SUM OF ALL RESPONSES TO PHQ QUESTIONS 1-9: 0
SUM OF ALL RESPONSES TO PHQ QUESTIONS 1-9: 0

## 2019-01-01 ASSESSMENT — ENCOUNTER SYMPTOMS
SORE THROAT: 0
EYE PAIN: 0
EYE PAIN: 0
CONSTIPATION: 0
ABDOMINAL PAIN: 0
CONSTIPATION: 0
CHEST TIGHTNESS: 0
PHOTOPHOBIA: 0
DIARRHEA: 0
CONSTIPATION: 0
SHORTNESS OF BREATH: 0
VOICE CHANGE: 0
WHEEZING: 0
NAUSEA: 0
DIARRHEA: 0
PHOTOPHOBIA: 0
TROUBLE SWALLOWING: 0
ABDOMINAL PAIN: 0
BLOOD IN STOOL: 0
COUGH: 0
ABDOMINAL PAIN: 0
CHOKING: 0
BACK PAIN: 0
ANAL BLEEDING: 0
SORE THROAT: 0
EYE REDNESS: 0
TROUBLE SWALLOWING: 1
ABDOMINAL DISTENTION: 0
SORE THROAT: 0
BACK PAIN: 0
SORE THROAT: 0
RHINORRHEA: 0
ABDOMINAL DISTENTION: 0
VOMITING: 0
COUGH: 0
COLOR CHANGE: 0
BACK PAIN: 0
CHOKING: 0
ABDOMINAL PAIN: 0
WHEEZING: 0
BLOOD IN STOOL: 0
SHORTNESS OF BREATH: 0
BACK PAIN: 0
TROUBLE SWALLOWING: 0
EYE ITCHING: 0
WHEEZING: 0
VOMITING: 0
ABDOMINAL DISTENTION: 0
COUGH: 0
CHEST TIGHTNESS: 0
RECTAL PAIN: 0
CHEST TIGHTNESS: 0
SINUS PRESSURE: 0
NAUSEA: 0
EYE PAIN: 0
SORE THROAT: 0
SORE THROAT: 0
VOMITING: 0
SHORTNESS OF BREATH: 0
EYE REDNESS: 0
ABDOMINAL DISTENTION: 0
TROUBLE SWALLOWING: 1
CONSTIPATION: 0
NAUSEA: 0
ABDOMINAL PAIN: 0
TROUBLE SWALLOWING: 0
BACK PAIN: 0
NAUSEA: 0
EYE DISCHARGE: 0
EYE DISCHARGE: 0
TROUBLE SWALLOWING: 0
CHEST TIGHTNESS: 0
CHEST TIGHTNESS: 0
RHINORRHEA: 0
SHORTNESS OF BREATH: 0
SINUS PRESSURE: 0
APNEA: 0
VOMITING: 0
NAUSEA: 0
BLOOD IN STOOL: 0
COUGH: 0
COLOR CHANGE: 0
EYE ITCHING: 0
SHORTNESS OF BREATH: 0
EYE PAIN: 0
VOICE CHANGE: 0
BLOOD IN STOOL: 0
SINUS PAIN: 0
STRIDOR: 0
PHOTOPHOBIA: 0
PHOTOPHOBIA: 0
EYE REDNESS: 1
FACIAL SWELLING: 0

## 2019-01-01 ASSESSMENT — PAIN DESCRIPTION - PAIN TYPE
TYPE: CHRONIC PAIN
TYPE: ACUTE PAIN
TYPE: CHRONIC PAIN
TYPE: ACUTE PAIN

## 2019-01-01 ASSESSMENT — PAIN DESCRIPTION - PROGRESSION: CLINICAL_PROGRESSION: NOT CHANGED

## 2019-01-01 ASSESSMENT — PAIN DESCRIPTION - FREQUENCY
FREQUENCY: CONTINUOUS
FREQUENCY: INTERMITTENT
FREQUENCY: CONTINUOUS

## 2019-01-01 ASSESSMENT — PAIN DESCRIPTION - DESCRIPTORS
DESCRIPTORS: ACHING;CONSTANT;HEADACHE
DESCRIPTORS: ACHING

## 2019-01-01 ASSESSMENT — PAIN DESCRIPTION - LOCATION
LOCATION: HEAD
LOCATION: FACE;HEAD
LOCATION: BACK
LOCATION: HEAD
LOCATION: BACK
LOCATION: HEAD
LOCATION: HEAD

## 2019-01-01 ASSESSMENT — PAIN - FUNCTIONAL ASSESSMENT: PAIN_FUNCTIONAL_ASSESSMENT: ACTIVITIES ARE NOT PREVENTED

## 2019-01-01 ASSESSMENT — LIFESTYLE VARIABLES: SMOKING_STATUS: 0

## 2019-03-17 PROBLEM — S09.90XA HEAD INJURY DUE TO TRAUMA: Status: ACTIVE | Noted: 2019-01-01

## 2019-03-17 PROBLEM — S06.2X0S MIDLINE SHIFT OF BRAIN DUE TO HEMATOMA (HCC): Status: ACTIVE | Noted: 2019-01-01

## 2019-03-17 PROBLEM — S06.5XAA SDH (SUBDURAL HEMATOMA): Status: ACTIVE | Noted: 2019-01-01

## 2019-03-17 PROBLEM — S06.5X0A TRAUMATIC SUBDURAL HEMORRHAGE WITHOUT LOSS OF CONSCIOUSNESS (HCC): Status: ACTIVE | Noted: 2019-01-01

## 2019-03-17 PROBLEM — G93.89 MIDLINE SHIFT OF BRAIN DUE TO HEMATOMA (HCC): Status: ACTIVE | Noted: 2019-01-01

## 2019-03-17 PROBLEM — G93.6 CEREBRAL EDEMA (HCC): Status: ACTIVE | Noted: 2019-01-01

## 2019-03-21 PROBLEM — Z79.01 ANTICOAGULATED: Status: ACTIVE | Noted: 2019-01-01

## 2019-03-25 PROBLEM — S09.90XA HEAD INJURY DUE TO TRAUMA: Status: RESOLVED | Noted: 2019-01-01 | Resolved: 2019-01-01

## 2019-03-25 PROBLEM — S06.5XAA SUBDURAL HEMATOMA DUE TO CONCUSSION: Status: ACTIVE | Noted: 2019-01-01

## 2019-03-25 NOTE — PROGRESS NOTES
Admitted to the Inpatient Rehabilitation Unit via bed. Patient was then oriented to room and unit. Education provided on the rehabilitation routine: three hours of therapy five days per week and dining room for lunch. Admitting medication orders compared with acute stay medications; home medication list reviewed with patient/family. Medication issues identified no  Medication issue:no      Bladder and Bowel Function Assessment:  1. Prior history of bladder problems: urgency only but no incontinence  2. Number of pads used per day: n/a  3. Frequency of night time voidin-3  4. Fluid intake volume and pattern: 5-6 cups  5. Last BM:3/25/19  6. Prior history of bowel problems: no      Incontinence      Frequent diarrhea      Constipation      Hemorrhoids      Diverticulitis      Bowel Surgery       Care plan was created with patient's input and goals were agreed upon. Admission folder provided with education regarding patients diagnoses, fall prevention, skin care, and M in the box. \"Data Collection Information Summary for Patients in Inpatient Rehabilitation Facilities\" and \"Privacy Act Statement - Health Care Records\" provided. Please refer to the admission navigator for further information. Admitted to the Inpatient Rehabilitation Unit via bed. Patient was then oriented to room and unit. Education provided on the rehabilitation routine: three hours of therapy five days per week and dining room for lunch. Telisiter maint. And spouse request live sitter but none available. Reviewed truamatic brain injury guidelines. And visitor restriction.       7.

## 2019-03-26 NOTE — PROGRESS NOTES
[facility]  Speech - Language - Cognitive Evaluation + Bedside Swallowing Evaluation     SLP Individual Minutes  Time In: 4068  Time Out: 1100  Minutes: 30  Timed Code Treatment Minutes: 0 Minutes   Bedside swallowing evaluation: [id number]  Speech, language, cognitive evaluation: [id number]       Date: 3/26/2019  Patient Name: Josseline Howard      MRN: [id number]    : 1931  (80 y.o.)  Gender: male   Referring Physician:  Susanne Garcia MD  Diagnosis:   Secondary Diagnosis: Dysphagia, expressive/receptive language deficits  Date of Last MBS:  n/a  Diet:  Dysphagia II with nectar thick liquids (no straw)    History of Present Illness/Injury: Pt admitted to [facility] with above med dx; please refer to physician H&P for full details. Per chart review, pt admitted s/p fall with TBI without LOC. LEFT subdural hemorrhage with marked mass effect and 14 mm of midline shift toward the right s/p LEFT  frontotemporoparietal craniectomy with evacuation of LEFT pan hemispheric acute subdural hematoma by Dr. Gayl Homans on 3/17/2019. Pt's wife Karyle Chiquito reports, \"We have had speech therapy in the past for cognition; he had a brain injury in  with lots of rehab to follow. \" Transfer to [facility] for further rehabilitation needs. ST consulted to assess expressive/receptive language skills and swallow function to develop POC as appropriate.   Past Medical History:   Diagnosis Date    Ankle fracture 2016    left in cast    Arthritis     BPH (benign prostatic hyperplasia)     CAD (coronary artery disease)     margarette    Cancer of bladder, neck (HCC)     Cervical vertebra fusion     Colon polyps     abrahan    Foot drop, left 2015    GERD (gastroesophageal reflux disease)     Hyperlipemia     Macular degeneration     Melanoma (HCC)     Neck, Scalp    RISHABH on CPAP     Parkinson disease ([facility] [address] 2016    Presbycusis of both ears     Spinal stenosis     Subdural hematoma ([facility] [address] 2015       resolved     Syncope     Traumatic subdural hemorrhage without loss of consciousness ([facility] [address] 3/17/2019       Precautions: Fall risk, bone flap (needs helmet), aspiration precautions  Pain:  Unable to identify; no observed facial grimaces    Subjective:  Pt resting in bed upon arrival with spouse present at bedside. Fatigue apparent, however, adequate alertness for completion of evaluations. SOCIAL HISTORY: Social hx obtained from spouse. Pt resides in [address] [place] [address]; 3 children and 3 step-children, however, unable to provide direct assistance; strong Religion family to \"help out\" when possible. Prior independence for completion of ADLs and IADLs, including driving; not responsible for financial or medication management. Wears glasses and bilateral hearing aids. Retired  who enjoys spending time outdoors walking/hiking trails and playing mini golf. SPEECH / VOICE:  Voice: diminished functional intensity  Speech: speech intelligibility approximated 50% for single word responses; 2-3 word phrases with intelligibility approximating 25% given blended word boundaries and decreased articulatory precision  *documented hx of Parkinson disease in 2016    LANGUAGE:  [address] Screening Test (MAST) completed with results as follows:  Expressive Index:  Namin/10  Automatic: 0/10  Repetition: 0/10  Writin/10  Verbal fluency: 0/10  Expressive subscale: 2/50    Receptive Index:  Yes/no accuracy: 2/20  Object recognition: 0/10  Following instructions: 0/10  Reading instructions: 0/10  Receptive subscale: 2/50    Total score: 4/100    COGNITION:  Unable to assess cognition given severity of expressive and receptive language deficits exhibited. O-log completed given documented head injury with score of 0/30 obtained; current score compounded given linguistic impairments.      SWALLOWING:  Respiratory Status: [] Independent [x] Nasal Cannula [] Oxygen Mask      [] Tracheostomy [] Other:     [] Ventilator/Settings:    ORAL MECHANISM EVALUATION:  Limited oral mechanism evaluation completed given deficits in following 1-step commands despite provision of visual model and MAX cues. Full dentures in place with adequate fit detected. Generalized facial weakness with lingual fasiculations apparent at rest. Diminished hyolaryngeal elevation upon tactile palpitation. Volitional cough unable to produced upon command. PATIENT WAS EVALUATED USING:  Puree, mixed/soft, nectar thick liquids    ORAL PHASE: [] WFL  [x] Impaired   [] Loss of bolus from lips [x] Impaired AP movement [] Pocketing Left   [] Pocketing Right  [] Lingual Pumping  [x] Impaired Mastication   [x] Reduced Bolus Formation [] Impaired Oral Initiation    [] OTHER:    PHARYNGEAL PHASE: [] WFL: Pharyngeal phase appears WFLs, but cannot completely rule out pharyngeal phase deficits from a bedside swallow evaluation alone. [x] Impaired   [x] Delayed Swallow  [x] Decreased Hyolaryngeal Elevation [] Audible Swallow   [x] Suspected Pharyngeal Residue due to spontaneous multiple swallow. [] OTHER:    SIGNS AND SYMPTOMS OF LARYNGEAL PENETRATION / ASPIRATION:  [] NO sign/symptoms of aspiration evident with this evaluation, but cannot rule out silent aspiration. [x] Throat Clear  [x] Immediate Cough [] Delayed Cough [] Wet Vocal Quality  [] Change in Pulmonary Status  [] OTHER:    RECOMMENDATIONS:     Modified Barium Swallow: [x] Is indicated to further assess      DIET RECOMMENDATIONS:  Dysphagia I with nectar thick liquids    STRATEGIES: [x] Strategies pending MBS results.   [x] Full upright position  [x] Small bite/sip [x] No Straw [] Multiple Swallow  [] Chin tuck [] Head turn [x] Pulmonary monitoring [] Oral care after all meals  [] Supervision  [x] Medication in applesauce - crushed [x]Direct 1:1 Supervision  [] Spoon all liquids [] Alternate solid / liquid [x] Limit distractions [x] Monitor for fatigue  [] PMV in place for all po [x] OTHER: PO intake ONLY when alert      IMPRESSIONS: Pt presents with at least moderate-severe oropharyngeal dysphagia based on findings outlined above. Oral phase significantly prolonged with soft textures with minimal attempts at mastication; immature chewing pattern via munching, impacting effective bolus breakdown and control, resulting in immediate cough with subsequent throat clears to follow swallow trigger. Presented trial of pudding with improved oral phase detected, however, slight bolus holding with min cues needed to elicit swallow initiation. Bethany thick juice via up with throat clears x2 for x4 oz consumed with production of spontaneous, multiple swallows correlating to some degree of pharyngeal residue. Recommend CONSERVATIVE diet establishment of dysphagia I with nectar thick liquids at this time with formal instrumentation via MBS to be completed on 3/27/19 to further assess pharyngeal integrity, establish safest PO diet level, and develop dysphagia POC as indicated. Additionally, pt presents with severe-profound expressive and receptive language deficits. Unable to establish consistent yes/no response format to convey BASIC wants/needs within relevant context given deferral to verbal responses of \"ok\" and \"alright\". 1-step commands NOT able to be executed despite implementation of visual model and provision of max cues. Confrontational naming of core objects diminished with pt unable to identify objects for functional usage (e.g., pen, comb). Cognitive measures unable to be assessed at this time; O-log initiated with current score of 0/30, compounded due to current severity of deficits exhibited. Suspect some degree of dysarthria; will need to monitor within subsequent therapy sessions to ascertain need for further goal development given minimal verbal output at date.  HIGHLY recommend intensive ST services to address linguistic domains to improve effective communication within current and future settings for wants/needs. COMMUNICATION  Comprehension: 1 - Patient understands basic needs <25% of the time  Expression: 1 - Expresses basic ideas/needs < 25% of the time  SOCIAL COGNITION  Social Interaction: 1 - Patient appropriate < 25% of the time  Problem Solvin - Patient solves simple/routine tasks < 25%  Memory: 1 - Patient remembers < 25% of the time      REHAB POTENTIAL: [] Excellent [x] Good [] Fair  [] Poor    EDUCATION:   Learner: [x]Patient [x] Significant other [] Son/Daughter [] Parent     [] Other:   Education: [x] Reviewed results and recommendations of this evaluation     [x] Reviewed diet and strategies     [x] Reviewed signs, symptoms and risk of aspiration     [] Demonstrated how to thick liquid appropriately. [x] Reviewed goals and Plan of Care     [] OTHER:   Method: [x] Discussion [] Demonstration [] Hand-out     [] OTHER:   Evaluation of Education:     [x] Verbalizes understanding - spouse [] Demonstrates with assistance     [] Demonstrates without assistance  [x]Needs further instruction     [x] No evidence of learning - pt  [] Family not present    PLAN / TREATMENT RECOMMENDATIONS:  [x] Recommendations pending MBS  [x] Skilled SLP intervention on IP Rehab 30 minutes per day 5 days per week. Specific interventions for next session may include: speech/language tasks    SHORT TERM GOALS:  Short-term Goals  Timeframe for Short-term Goals: 1 week  Goal 1: Pt will tolerate dysphagia I diet with nectar thick liquids (no straw) without s/s aspiration to ensure safe nutrition/hydration measures. Goal 2: Pt will follow basic 1-step commands and answer yes/no questions with 50% accuracy given max cues to improve ability to convey wants/needs. Goal 3: Pt will complete automatic speech, repetition, and confrontational naming tasks with 50% accuracy given max cues to improve verbal expression.    Goal 4: Pt will ID objects/pictures in F02 with 50% accuracy given max cues to permit potential introduction of alternative communication needs. Goal 5: Pt will complete BASIC orientation tasks with 50% accuracy given max cues to improve awareness to immediate surroundings. LONG TERM GOALS:  Long-term Goals  Timeframe for Long-term Goals: 6 weeks  Goal 1: Pt will safely tolerate dysphagia II diet with thin liquids 98% of the time given compensatory swallowing strategies to ensure safe and adequate nutrition/hydration measures. Goal 2: Pt will improve expressive and receptive language skills to a mild impairment level or higher to permit improved ability to improve functional communication for wants/needs.         Nancy Irwin M.A., [address] [place] [address]

## 2019-03-26 NOTE — PROGRESS NOTES
Nutrition Assessment    Type and Reason for Visit: Initial, Consult(New Rehab Admit)    Nutrition Recommendations:   Magic Cup TID  Recommend routine weight monitoring ?18% weight loss x3 months? Diet texture per SLP    Nutrition Assessment:   Pt. nutritionally compromised AEB poor oral intake since admit. At risk for further nutrition compromise r/t increased nutrient needs to support post-op healing, continued poor appetite, dysphagia, and need for nutrition supplement. Will provide magic cup TID. Encouraged oral intake. Further diet recommendations per SLP. May consider a multivitamin. Malnutrition Assessment:  · Malnutrition Status: At risk for malnutrition  · Context: Acute illness or injury  · Findings of the 6 clinical characteristics of malnutrition (Minimum of 2 out of 6 clinical characteristics is required to make the diagnosis of moderate or severe Protein Calorie Malnutrition based on AND/ASPEN Guidelines):  1. Energy Intake-Less than or equal to 50% of estimated energy requirement, Greater than or equal to 5 days    2. Weight Loss-(Request weight monitoring),      Nutrition Risk Level: High    Nutrient Needs:  · Estimated Daily Total Kcal: 6411-3003 kailyn/d (30-35 kailyn/kg current weight)(56 kg)  · Estimated Daily Protein (g): 56-84 gm pro/d (1-1.5 gm pro/kg current weight)(56 kg)  · Estimated Daily Total Fluid (ml/day): Per Physician    Nutrition Diagnosis:   · Problem: Inadequate oral intake  · Etiology: related to Insufficient energy/nutrient consumption     Signs and symptoms:  as evidenced by Intake 25-50%, Weight loss greater than or equal to 7.5% in 3 months    Objective Information:  · Nutrition-Focused Physical Findings: Pt thin. Confusion and lethargy to respond. Solomon. Noted BM 3/25.    · Wound Type: Surgical Wound(face + head staples and wounds)  · Current Nutrition Therapies:  · Oral Diet Orders: Dysphagia 2, Nectar Thick, No Straws(Upright only when alert)   · Oral Diet intake:

## 2019-03-26 NOTE — PROGRESS NOTES
[address] [address]  Diagnosis List for Inpatient Rehab facility (IRF) - Patient Assessment Instrument (KENN)    Patient Name: Ruben Price        MRN: [id number]    : 1931  (80 y.o.)  Gender: male     Primary impairment requiring rehabilitation: 2.22 Traumatic, Closed brain injury     Etiologic Diagnosis that led to the condition: LEFT subdural hemorrhage    Comorbid conditions affecting rehabilitation:  1. 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 2. LEFT subdural hemorrhage with marked mass effect and 14 mm of midline shift toward the right s/p LEFT  frontotemporoparietal craniectomy with evacuation of LEFT pan hemispheric acute subdural hematoma by Dr. Clinton Lopez on 3/17/2019.  3. *Moderate oropharyngeal dysphagia. 4. Severe cognitive-linguistic impairment. 5. Gait instability. 6. Parkinson Disease dignosed in .  7. Chronic LEFT foot drop. 8. Hypoxia. 9. Post operative anemia with Hgb of 13.3 on 3/17 with decrease to 10.2 on 3/18 s/p two units of platelets. 10. History of prior subarachnoid hemhorrage in  s/p fall with LOC. 11. CAD. 12. HTN. 13. HLD. 14. BPH. 15. History of bladder neck cancer s/p surgical resection 8/3/2018 and . 16. History of melanoma s/p resection involving face and scalp. 17. History of Cervical fusion in . 18. Macular degeneration. 19. Presbycusis. Sleep apnea on CPAP. 20. Hypovitaminosis D.  21. Circadian rhythm disturbance  22. Leukocytosis/LEFT shift with fever and UA with positive nitrite, Large LE, >200 WBC, and many bacteria.     Marvell Dakins, MD

## 2019-03-26 NOTE — PROGRESS NOTES
[id number] [address] THERAPY  [facility]  EVALUATION    Time:  Time In: 840  Time Out: 940  Timed Code Treatment Minutes: 45 Minutes  Minutes: 60          Date: 3/26/2019  Patient Name: Dayami Morales,   Gender: male      MRN: [id number]  : 1931  (80 y.o.)  Referring Practitioner: Dr. Rayna Caban  Diagnosis: subdural hematoma due to concussion without loss of consciousness  Additional Pertinent Hx: He was admitted 3/17/2019 as a level III trauma after a fall at home where he struck the LEFT side of his head on a nightstand. Per report the patient had switched sides of the bed with his wife; she was scheduled to have lumbar back surgery soon and sleeping on his side of the bed was easier for her. His neurological examination was normal with a GCS of 15 and his CT of the head did show a subdural hematoma over the LEFT hemisphere with a 5 mm LEFT to RIGHT shift. Neurosurgery was consulted and requested a 6 hour follow-up image which showed a significant increase in the size of the hematoma with marked mass effect and a 14 mm midline shift. The patient was taken emergently to the operating room by Dr. Alfa Cordova for a craniectomy with evacuation of the LEFT subdural hematoma blood products on 3/17/2019. Since this procedure the patient has been somewhat more confused, has some noted hypoxia with a 3 L oxygen requirement via nasal cannula, a postoperative blood loss anemia with hemoglobin decreased 3 units and requiring 2 units of platelets to be transfused. The patient was on a daily inpatient rehabilitation unit in  after sustaining a subarachnoid hemorrhage after a fall with a brief loss of consciousness. Since that admission he has been diagnosed with Parkinson disease in 2016 and is followed by Dr. Varsha Menjivar.    Restrictions/Precautions:  Fall Risk, General Precautions      Other position/activity restrictions: Protective helmet on when up. OK to remove in supine only.         Past Medical History:   Diagnosis Date    Ankle fracture 2/2016    left in cast    Arthritis     BPH (benign prostatic hyperplasia)     CAD (coronary artery disease)     margarette    Cancer of bladder, neck (HCC)     Cervical vertebra fusion     Colon polyps 2010    [place]    Foot drop, left 9/8/2015    GERD (gastroesophageal reflux disease)     Hyperlipemia     Macular degeneration     Melanoma ([address]     Neck, Scalp    RISHABH on CPAP     Parkinson disease ([address] 2016    Presbycusis of both ears     Spinal stenosis     Subdural hematoma ([address] 2015       resolved     Syncope     Traumatic subdural hemorrhage without loss of consciousness ([address] 3/17/2019     Past Surgical History:   Procedure Laterality Date    BACK SURGERY      BLADDER SURGERY  5/31/11    BLADDER TUMOR EXCISION  2011    BLEPHAROPLASTY  2007, 2008    CARDIAC CATHETERIZATION  2009    moderate    margarette    CARDIAC CATHETERIZATION  11/2018    CATARACT REMOVAL Bilateral 1995    CERVICAL FUSION  2009    COLONOSCOPY  9-24-10      [place]  [facility]    CORONARY ANGIOPLASTY WITH STENT PLACEMENT  11/2018     [facility] eluting stent dr Adam Byrd Left 3/17/2019    LEFT  FRONTOTEMPOROPARIETAL CRANIECTOMY WITH EVACUATION OF LEFT PANHEMISPHERIC ACUTE SUBDURAL HEMATOMA performed by Moisés Mireels MD at [facility]  .6/03/2015    CYSTOSCOPY  08/2018    bladder tumor    [place]    DIAGNOSTIC CARDIAC CATH LAB PROCEDURE      EYE SURGERY      HEMORRHOID SURGERY  1994    DE CYSTOURETHROSCOPY,FULGUR .5-2CM LESN N/A 8/3/2018    CYSTOSCOPY TRANSURETHRAL RESECTION BLADDER TUMOR, SMALL performed by Dimitri Sotomayor MD at [address]      left nicole Liu      neck and scalp melanoma, cheek basal cell    SKIN CANCER EXCISION      left [place] - Dr Gregory Roman    TONSILLECTOMY             Subjective  Chart Reviewed: [facility] and p, physician notes, RN notes, prior therapy notes. )  Family / Caregiver Present: Yes    Subjective: cooperative. fatigued. SPouse present and supportive. much increased time needed for processing speed ( average 30 seconds)    General:       Vision: Impaired(fair attention and visual scanning to the left. somewhat fwd gaze throughout. )    Hearing Exceptions: Hard of hearing/hearing concerns, Bilateral hearing aid         Pain:  Pain Assessment  Patient Currently in Pain: Yes(in groin area . did not state a number.)       Social/Functional History:  Lives With: Spouse  Type of Home: House  Home Layout: One level  Home Access: Stairs to enter with rails  Entrance Stairs - Number of Steps: 3  Home Equipment: Cane     Bathroom Shower/Tub: Walk-in shower, Shower chair with back  [employer]: Handicap height  Bathroom Equipment: Grab bars in shower, Grab bars around toilet  Bathroom Accessibility: Accessible    Receives Help From: Family  ADL Assistance: Independent  Homemaking Assistance: Independent       Ambulation Assistance: Independent  Transfer Assistance: Independent    Active : Yes  Mode of Transportation: Car     Additional Comments: Per spouse, pt was very indep with all ADL tasks at home. Pt completed all the driving at this time d/t spouse health problems. Pt did not use any AD with mobility. Objective              LUE AROM (degrees)  LUE AROM : WFL        RUE AROM (degrees)  RUE AROM : WFL  RUE General AROM: slight lag noted during shoulder flexion        LUE Strength  LUE Strength Comment: min generalized weakness noted throughout. RUE Strength  RUE Strength Comment: 4-/5 throughout with increased time needed for response to MMT commands. Bed mobility  Sit to Supine: Minimal assistance(increased time for processing speed to lay supine . min cues for tech. )    Transfers  Sit to stand: Minimal assistance(x 2 from the recliner chair due to fatigued. min cues for hand placement.  CGA for stand to sit . increased time needed throughour for processing speed. )    Balance  Sitting Balance: Contact guard assistance  Standing Balance: Minimal assistance(x 1, SBA of anther during toileting tasks.)     Time: x2 minutes x2 trials  Activity: Static stand with B UEs supported on the RW. PT declined to let go for assistance with toileting tasks. Functional Mobility  Functional - Mobility Device: Rolling Walker  Assist Level: Minimal assistance  Functional Mobility Comments: x 2 from the recliner chair x 3 feet to the [facility]. min A for guiding the RW for turns. min A x 2 for returning to EOB at EOS. min difficulty advanceing R LE with mobility. Apparatus Needs: O2(3 Liters O2. )          Eating: 3 - Unable to put food on utensil(PT able to feed himself half of his meal. Spouse assisted with 50% of meal.)   Groomin - Able to perform 1-2 tasks (max assist)(Pt perseverative on washing face as therapist asked to continue to wash additional body parts. min A for washing hands .)   Bathin - Able to bathe 2 or less areas/total assist(max A for hand over hand and initiation to wash each body part. Assit to wash B underarms, B LEs and bottom. second person needed for standing to wash bottom. max cues for continuation of task. mod increased time needed for processing speed throughout. )   Dressing-Upper: 1 - Requires assist with 4 tasks/total assist(mod A for initiation of task, max A for pulling shirt over arms, mod A for overhead and mod A for pulling down in the morena k. )   Dressing-Lower: 1 - Total assist with lower body dressing(max A for pants over feet, min A standing balnace x 1, CGA of another for safety, max A for clothing mgt up. )   Toiletin - Total assist(assist for clothing mgt up and down. assit to wipe.  Pt declined to wipe for toileting tasks. )            Activity Tolerance:  Activity Tolerance: Patient limited by fatigue, Treatment limited secondary to decreased cognition    Treatment Initiated: BADL assessment completed as detailed above. Second person present due to fatigue and slow processing speed. Throughout. Multiple rest breaks provided as well as much increased time needed for processing speed. Returned to supine at EOS. Assessment:  Assessment: Pt s/p craniotomy with bone flap on 3/17/19. Pt dmeo deficits in right UE and need for increased assistance during ADL task. He currntly needs max cueing for initiation and problem solving as well as processing speed for basic care tasks. . Pt demo decreased standing balance during ADL tasks. Pt would benefit from skilled OT Services to increase his endurance for ADL tasks and mobility to eventual return home. Performance deficits / Impairments: Decreased functional mobility , Decreased safe awareness, Decreased ROM, Decreased endurance, Decreased balance, Decreased ADL status, Decreased strength, Decreased cognition, Decreased high-level IADLs  Prognosis: Fair    Clinical Decision Making: Clinical Decision making was of Moderate Complexity as the result of analysis of data from a detailed assessment, a consideration of several treatment options, the presence of comorbidities affecting the plan of care and the need for minimal to moderate modifications or assistance required to complete the evaluation. Discharge Recommendations:  Discharge Recommendations: Continue to assess pending progress    Patient Education:  Patient Education: OT POC< safe mobility, problem solving tasks     Equipment Recommendations: Other: Pt has a shower chair, grab bars in BR and shower. Safety:  Safety Devices in place: Yes  Type of devices:  All fall risk precautions in place, Call light within reach, Bed alarm in place, Gait belt, Patient at risk for falls, Left in bed    Plan:  Times per week: 5xs week x 90 min, 1 x week x 30 min  Current Treatment Recommendations: Strengthening, Balance Training, Endurance Training, Neuromuscular Re-education, Patient/Caregiver Education & Training, Self-Care / ADL, Safety Education & Training, Functional Mobility Training, Home Management Training, Equipment Evaluation, Education, & procurement, Cognitive/Perceptual Training    Goals:  Patient goals : spouse reporting she wants pt to get stronger and eventually return home after rehab. Pt stated \"yeah\"    Short term goals  Time Frame for Short term goals: 1 week  Short term goal 1: Pt will initiate ADL routine with no > mod cues for attention to task and continueation of task. to increase initiation into daily activities. Short term goal 2: PT will complete functional ambulation to and from the BR with no > min A x 1 and RW with no > Mod cues for R LE placement and awareness to increase ability tc complete toileting routine  Short term goal 3: Pt will incorporate R hand into daily dressing tasks including opening the hand to grasp for items with no > mod cues for increased independence in grooming tasksq  Short term goal 4: Pt to demo dynamic standing balance > 2 min with 0-1 UE support when reaching outside base of support with CGA in prep for completing clothing management after toileting and sinkside ADL tasks   Long term goals  Time Frame for Long term goals : 4-5 weeks  Long term goal 1: Pt will complete ADL routine with no > cues for initiation to task and problem solving tasks to increase independence in self care tasks  Long term goal 2: PT will complete functional ambulation to and from the BR as well as around obstacles with SBA and no > min cues for attention to safe mobiltiy to increase independence in self care tasks  Long term goal 3: Pt will follow 1 step commands for basic homemaking tasks with no > min cues for attention to task to increase independence in retrieving a snack. Evaluation Complexity: Based on the findings of patient history, examination, clinical presentation, and decision making during this evaluation, this patient is of medium complexity.

## 2019-03-26 NOTE — H&P
Physical Medicine & Rehabilitation  History and Physical      Chief Complaint and Reason for Rehabilitation Admission: LEFT frontal subdural hemorrhage s/p craniectomy/evacuation    History of Present Illness:  Sarwat Reed is a 80 y.o. male who  has a past medical history of Ankle fracture, Arthritis, BPH (benign prostatic hyperplasia), CAD (coronary artery disease), Cancer of bladder, neck (HCC), Cervical vertebra fusion, Colon polyps, Foot drop, left, GERD (gastroesophageal reflux disease), Hyperlipemia, Macular degeneration, Melanoma ([address] RISHABH on CPAP, Parkinson disease ([address] Presbycusis of both ears, uses hearing aids, Spinal stenosis, Subdural hematoma ([address] Syncope, and Traumatic subdural hemorrhage without loss of consciousness ([address] He was admitted 3/25/2019 to the inpatient rehabilitation unit. Original admission 3/17/2019 as a level III trauma after a fall at home where he struck the LEFT side of his head on a nightstand. Per report the patient had switched sides of the bed with his wife; she was scheduled to have lumbar back surgery soon and sleeping on his side of the bed was easier for her. His neurological examination was normal with a GCS of 15 and his CT of the head did show a subdural hematoma over the LEFT hemisphere with a 5 mm LEFT to RIGHT shift. Neurosurgery was consulted and requested a 6 hour follow-up image which showed a significant increase in the size of the hematoma with marked mass effect and a 14 mm midline shift. The patient was taken emergently to the operating room by Dr. Mary Fisher for a craniectomy with evacuation of the LEFT subdural hematoma blood products on 3/17/2019. Since this procedure the patient has been somewhat more confused, has some noted hypoxia with a 3 L oxygen requirement via nasal cannula, a postoperative blood loss anemia with hemoglobin decreased 3 units and requiring 2 units of platelets to be transfused.   The patient was on a daily inpatient rehabilitation unit in 2015 after sustaining a subarachnoid hemorrhage after a fall with a brief loss of consciousness. Since that admission he has been diagnosed with Parkinson disease in 2016 and is followed by Dr. Kathrin Hodgson.     On initial consultation exam the patient is resting comfortably in his hospital bed; there is a nasal cannula in place at a setting of 3 L of oxygen and he has corrective glasses in place. He has been supplied with a helmet; but this is reported to be somewhat ill fitting and he is to be reevaluated for an adjustment or replacement of this helmet. On exam he has generalized weakness at 4/5 with a noted LEFT foot drop. There is a notable skull defect, consistent with his recent surgical history of a craniectomy after which the bone flap was not replaced. There are currently staples in place over a extended C-shaped incision over the temporoparietal lobe. The patient is oriented to self and when asked where he realized he was unable to state so even with cues to read the board in his room that has the name of this hospital on it. He denies any pain at this time. Speech is somewhat dysarthric. It was discussed with the patient, his wife and stepdaughter about the patient coming to the acute inpatient rehabilitation unit and they are agreeable to do so. His spouse now has her lumbar surgery scheduled for April 23 and also states that they are planning on moving to [facility] into an independent living situation this coming summer. In the interim SLP downgraded his diet to Dysphagia I with plan for an MBS tomorrow. His potassium was low this AM and required repletion. He now has his hearing aids in place. His helmet was modified today and fits better. He is having some issues with intermittent issues of inability to void with scan less than 300mL. His spouse states he is more verbal today. His orientation on exam is still limited to self only.      Previously was independent of ADLs and used Straight Cane AD for ambulation prior to recent admission. Initially has ambulated 15' with RW at [facility] with PT. With therapy is CGA for bed mobility, Kathleen for transfers, and Kathleen with balance. ROM restrictions, WB restrictions, precautions: Restrictions/Precautions: Fall Risk, General Precautions  Other position/activity restrictions: Protective helmet on when up. OK to remove in supine only. Fall Risk    Current Rehabilitation Assessments:  Physical therapy: FIMS:  Bed Mobility:      Transfers: Sit to Stand: Minimal Assistance(To facilitate forward trunk flexion. )  Stand to sit: Minimal Assistance(Requires extra processing time, Tactile cueing for proper hand placement. ), Ambulation 1  Surface: level tile  Device: Rolling Walker  Other Apparatus: O2, Wheelchair follow  Assistance: Minimal assistance(Kathleen of 1 for walking and assist of another for close wheelchair follow)  Quality of Gait: Pt. demonstrates forward flexed posture, high steppage gait on LLE only, lacking R foot clearance from floor with decreased step length on R, occasional assistance with RW negotiation  Distance: 45 feet x 2 ,      FIMS: Bed, Chair, Wheel Chair: 2 - Requires 50-74% assistance to transfer  Walk: 1 - Total Assistance Walks < 50 feet OR requires two or more people OR patient performs < 25% of locomotion effort  Distance Walked: 45 feet x 2 , PT Equipment Recommendations  Equipment Needed: Yes(Continue to assess (possbile RW)), Assessment: Pt. requires increased processing time for tasks and difficulty following multi step commands due to decreased cognition. Pt. is a level 5 on the [employer]. Pt. requires frequent verbal and tactile cueing for proper hand placement with transfers.      Occupational therapy: FIMS:  Eating: 3 - Unable to put food on utensil(PT able to feed himself half of his meal. Spouse assisted with 50% of meal.)  Grooming: (for cleaning dentures and placing them in mouth. )  Bathin - Able to bathe 2 or less areas/total assist(max A for hand over hand and initiation to wash each body part. Assit to wash B underarms, B LEs and bottom. second person needed for standing to wash bottom. max cues for continuation of task. mod increased time needed for processing speed throughout. )  Dressing-Upper: 1 - Requires assist with 4 tasks/total assist(mod A for initiation of task, max A for pulling shirt over arms, mod A for overhead and mod A for pulling down in the morena k. )  Dressing-Lower: 1 - Total assist with lower body dressing(max A for pants over feet, min A standing balnace x 1, CGA of another for safety, max A for clothing mgt up. )  Toiletin - Total assist  Toilet Transfer: 3 - Requires 25-49% assist getting off toilet(min A sit to stand from a std height toilet.),  , Assessment: Pt s/p craniotomy with bone flap on 3/17/19. Pt dmeo deficits in right UE and need for increased assistance during ADL task. He currntly needs max cueing for initiation and problem solving as well as processing speed for basic care tasks. . Pt demo decreased standing balance during ADL tasks. Pt would benefit from skilled OT Services to increase his endurance for ADL tasks and mobility to eventual return home. Speech therapy: FIMS: Comprehension: 1 - Patient understands basic needs <25% of the time  Expression: 1 - Expresses basic ideas/needs < 25% of the time  Social Interaction: 1 - Patient appropriate < 25% of the time  Problem Solvin - Patient solves simple/routine tasks < 25%  Memory: 1 - Patient remembers < 25% of the time    IMPRESSIONS: Pt presents with at least moderate-severe oropharyngeal dysphagia based on findings outlined above.  Oral phase significantly prolonged with soft textures with minimal attempts at mastication; immature chewing pattern via munching, impacting effective bolus breakdown and control, resulting in immediate cough with subsequent throat clears  Hyperlipemia     Macular degeneration     [address] [address] [facility]     Neck, Scalp    RISHABH on CPAP     Parkinson disease ([address] 2016    Presbycusis of both ears, uses hearing aids     Spinal stenosis     Subdural hematoma ([address] 2015       resolved     Syncope     Traumatic subdural hemorrhage without loss of consciousness ([address] 3/17/2019       Primary care provider: Adriane Lu MD     Past Surgical History:      Procedure Laterality Date   [address] BLADDER SURGERY  5/31/11    BLADDER TUMOR EXCISION  2011    BLEPHAROPLASTY  2007, 2008    CARDIAC CATHETERIZATION  2009    moderate    margarette    CARDIAC CATHETERIZATION  11/2018    CATARACT REMOVAL Bilateral 1995    CERVICAL FUSION  2009    COLONOSCOPY  9-24-10      [facility]  wnl    CORONARY ANGIOPLASTY WITH STENT PLACEMENT  11/2018     [facility] drug eluting stent dr Micki Abmrosio Left 3/17/2019    LEFT  FRONTOTEMPOROPARIETAL CRANIECTOMY WITH EVACUATION OF LEFT PANHEMISPHERIC ACUTE SUBDURAL HEMATOMA performed by Martita [facility] at [facility] 9  .6/03/2015    CYSTOSCOPY  08/2018    bladder tumor    [place]    DIAGNOSTIC CARDIAC CATH LAB PROCEDURE      EYE SURGERY      HEMORRHOID SURGERY  1994    [place] CYSTOURETHROSCOPY,FULGUR .5-2CM LESN N/A 8/3/2018    CYSTOSCOPY TRANSURETHRAL RESECTION BLADDER TUMOR, SMALL performed by Luis Camejo MD at [address] [place] [address]      left rivera - Dr Eddie Fregoso      neck and scalp melanoma, cheek basal cell    SKIN CANCER EXCISION      left rivera - Dr Chyna King TONSILLECTOMY       Allergies:    Sulfa antibiotics    Current Medications:    Current Facility-Administered Medications: potassium replacement protocol, , Other, RX Placeholder  hydrocortisone 1 % cream, , Topical, BID  [START ON 3/27/2019] vitamin D (CHOLECALCIFEROL) tablet 1,000 Units, 1,000 Units, Oral, Daily  finasteride (PROSCAR) tablet 5 mg, 5 mg, Oral, Daily  acetaminophen (TYLENOL) tablet 650 mg, 650 mg, Oral, Q4H PRN  bacitracin ophthalmic ointment, , Topical, TID  famotidine (PEPCID) tablet 20 mg, 20 mg, Oral, BID  magnesium hydroxide (MILK OF MAGNESIA) 400 MG/5ML suspension 30 mL, 30 mL, Oral, Daily PRN  sodium chloride flush 0.9 % injection 10 mL, 10 mL, Intravenous, 2 times per day  sodium chloride flush 0.9 % injection 10 mL, 10 mL, Intravenous, PRN  amLODIPine (NORVASC) tablet 2.5 mg, 2.5 mg, Oral, Daily  atorvastatin (LIPITOR) tablet 10 mg, 10 mg, Oral, Nightly  levETIRAcetam (KEPPRA) tablet 500 mg, 500 mg, Oral, BID  multivitamin 1 tablet, 1 tablet, Oral, Daily  tamsulosin (FLOMAX) capsule 0.4 mg, 0.4 mg, Oral, Nightly  miconazole (MICOTIN) 2 % powder, , Topical, BID  docusate sodium (COLACE) capsule 100 mg, 100 mg, Oral, BID PRN  ondansetron (ZOFRAN) tablet 4 mg, 4 mg, Oral, Q8H PRN  senna (SENOKOT) tablet 17.2 mg, 2 tablet, Oral, Nightly PRN  carbidopa-levodopa (SINEMET)  MG per tablet 1.5 tablet, 1.5 tablet, Oral, 3 times per day **AND** rOPINIRole (REQUIP) tablet 0.5 mg, 0.5 mg, Oral, 3 times per day     Social History:   reports that he quit smoking about 40 years ago. He has never used smokeless tobacco. He reports that he does not drink alcohol or use drugs. Lives at [address] [place] [id number].      Social/Functional History  Lives With: Spouse  Type of Home: House  Home Layout: One level  Home Access: Stairs to enter with rails  Entrance Stairs - Number of Steps: 3  Entrance Stairs - Rails: Left  Bathroom Shower/Tub: Walk-in shower, Shower chair with back  Bathroom Toilet: Handicap height  Bathroom Equipment: Grab bars in shower, Grab bars around toilet  Bathroom Accessibility: Accessible  Home Equipment: Chayo Licona Help From: Family  ADL Assistance: Independent  Homemaking Assistance: Independent  Ambulation Assistance: Independent  Transfer Assistance: Independent  Active : Yes  Mode of Transportation: Car  Additional Comments: Per spouse, pt was very indep with all ADL tasks at home. Pt completed all the driving at this time d/t spouse health problems. Pt did use cane in community. Pt.'s wife stated that his Parkinsons has caused him to have L sided arm and jaw tremors and she feels that it is currently effecting is recall and has difficulty with word finding. Functional History:  Prior Function  Lives With: Spouse  Receives Help From: Family  ADL Assistance: Independent  Homemaking Assistance: Independent  Ambulation Assistance: Independent  Transfer Assistance: Independent  Additional Comments: Per spouse, pt was very indep with all ADL tasks at home. Pt completed all the driving at this time d/t spouse health problems. Pt did use cane in community. Pt.'s wife stated that his Parkinsons has caused him to have L sided arm and jaw tremors and she feels that it is currently effecting is recall and has difficulty with word finding.    IADL History:  Lift/Transfer Equipment Utilized: Sterosalinda    Family History:       Problem Relation Age of Onset    Heart Disease Mother     Heart Surgery Mother     Heart Attack Father     Heart Disease Father 37        MI    Stroke Maternal Grandfather     Prostate Cancer Neg Hx     Cancer Neg Hx     Diabetes Neg Hx     High Blood Pressure Neg Hx        Review of Systems:  CONSTITUTIONAL:  positive for fatigue  EYES:  positive for  glasses  HEENT:  Positive for hearing loss  RESPIRATORY:  positive for NC  CARDIOVASCULAR:  negative  GASTROINTESTINAL:  Positive for incontinence  GENITOURINARY:  positive for urinary incontinence, retention  SKIN:  Positive for bruising  HEMATOLOGIC/LYMPHATIC:  negative  MUSCULOSKELETAL:  positive for  muscle weakness  NEUROLOGICAL:  positive for memory problems, gait problems, dysphagia and weakness  BEHAVIOR/PSYCH:  negative  10 point system review otherwise negative    Physical Exam:  /78   Pulse 88   Temp 97.9 °F (36.6 °C) (Oral)   Resp 18   Ht 5' 10\" (1.778 m)   Wt 123 lb 1.6 oz (55.8 kg)   SpO2 93%   BMI 17.66 kg/m²     Awake  Orientation:   Person, unable to establish location  Mood: dysthymic  Affect: flat  General appearance: Patient is well nourished, well developed, well groomed and in no acute distress, appearing stated age, glasses and [email or web address], hearing aids, helmet on     Memory:   abnormal - unable to remember his fall  Attention/Concentration: abnormal - slowed responses; good one step command following  Language:  abnormal - expressive aphasia and dysarthria     Cranial Nerves:  cranial nerves II-XII are grossly intact  With exception of decreased hearing acuity  ROM:  normal  Motor Exam:  Motor exam is 4 out of 5 all extremities with the exception of LEFT foot drop 2/5     Tone:  normal  Muscle bulk: decreased  Sensory:  Sensory intact  Coordination:   Normal for FTN and JES  Deep Tendon Reflexes:  Reflexes are intact and symmetrical bilaterally     Skin: warm and dry, no rash or erythema and ecchymoses noted on LEFT forehead, orbit and brow  Peripheral vascular: Pulses: Normal upper and lower extremity pulses; Edema: trace    Diagnostics:  Recent Results (from the past 24 hour(s))   CBC    Collection Time: 03/26/19  5:47 AM   Result Value Ref Range    WBC 10.4 4.8 - 10.8 thou/mm3    RBC 3.51 (L) 4.70 - 6.10 mill/mm3    Hemoglobin 10.9 (L) 14.0 - 18.0 gm/dl    Hematocrit 32.8 (L) 42.0 - 52.0 %    MCV 93.4 80.0 - 94.0 fL    MCH 31.1 26.0 - 33.0 pg    MCHC 33.2 32.2 - 35.5 gm/dl    RDW-CV 13.6 11.5 - 14.5 %    RDW-SD 46.2 (H) 35.0 - 45.0 fL    Platelets 119 097 - 194 thou/mm3    MPV 9.9 9.4 - 12.4 fL   Comprehensive Metabolic Panel    Collection Time: 03/26/19  5:47 AM   Result Value Ref Range    Glucose 108 70 - 108 mg/dL    CREATININE 0.5 0.4 - 1.2 mg/dL    BUN 10 7 - 22 mg/dL    Sodium 144 135 - 145 meq/L    Potassium 3.0 (L) 3.5 - 5.2 meq/L    Chloride 103 98 - 111 meq/L    CO2 31 23 - 33 meq/L    Calcium 8.1 (L) 8.5 - 10.5 mg/dL    AST 26 5 - 40 U/L    Alkaline Phosphatase 136 (H) 38 - 126 U/L    Total Protein 5.5 (L) 6.1 - 8.0 g/dL    Alb 2.8 (L) 3.5 - 5.1 g/dL    Total Bilirubin 0.6 0.3 - 1.2 mg/dL    ALT 7 (L) 11 - 66 U/L   Vitamin D 25 Hydroxy    Collection Time: 03/26/19  5:47 AM   Result Value Ref Range    Vit D, 25-Hydroxy 27 (L) 30 - 100 ng/ml   Anion Gap    Collection Time: 03/26/19  5:47 AM   Result Value Ref Range    Anion Gap 10.0 8.0 - 16.0 meq/L   Glomerular Filtration Rate, Estimated    Collection Time: 03/26/19  5:47 AM   Result Value Ref Range    Est, Glom Filt Rate >90 ml/min/1.73m2      No results found for: LABA1C  No results found for: EAG  Recent Labs     03/26/19  0547 03/18/19  0400 03/17/19  0840   WBC 10.4 11.3* 7.7   HGB 10.9* 10.2* 13.3*   HCT 32.8* 30.5* 40.5*   MCV 93.4 95.0* 96.0*    173 145     Impression:  1. 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 2. LEFT subdural hemorrhage with marked mass effect and 14 mm of midline shift toward the right s/p LEFT  frontotemporoparietal craniectomy with evacuation of LEFT pan hemispheric acute subdural hematoma by Dr. Jose Antonio Parks on 3/17/2019.  3. Severe-profound expressive and receptive language deficits. 4. Moderate-severe oropharyngeal dysphagia  5. Gait instability. 6. Parkinson Disease dignosed in 2016.  7. Chronic LEFT foot drop. 8. Hypoxia. 9. Post operative anemia with Hgb of 13.3 on 3/17 with decrease to 10.2 on 3/18 s/p two units of platelets. 10. History of prior subarachnoid hemhorrage in 2015 s/p fall with LOC. 11. CAD. 12. HTN. 13. HLD. 14. BPH. 15. History of bladder neck cancer s/p surgical resection 8/3/2018 and 2011. 16. History of melanoma s/p resection involving face and scalp. 17. History of Cervical fusion in 2009. 18. Macular degeneration. 19. Presbycusis with use of hearing aids. 20. Sleep apnea on CPAP. 21. Hypovitaminosis D.     Plan:   The patient demonstrates good potential to participate in an inpatient rehabilitation program involving at least 3 hours per day, 5 days per week of intensive rehabilitation. Rehabilitation services will include PT, OT and SLP/RT in order to improve functional status prior to discharge. Family education and training will be completed. Equipment evaluations and recommendations will be completed as appropriate. Medical management: Per Dr. Saeed Degroot. Consultants:  Neurosurgery, Trauma Surgery, Critical Care, Family Medicine, Physical Medicine    Narcotic usage:  N/A  Last BM:  Stool Amount: Small (03/26/19 1935)  Stool Assessment  Incontinence: Yes (03/26/19 1935)  Stool Appearance: Soft (03/26/19 1935)  Stool Color: Lenette John (03/26/19 1935)  Stool Amount: Small (03/26/19 1935)  Stool Source: Rectum (03/26/19 1935)    Acute/Rehabilitation Problems:  1. Fall with TBI without LOC/LEFT subdural hemorrhage/s/p LEFT  frontotemporoparietal craniectomy with evacuation. 1. Surgery 3/17/2019.  2. Wound care. 3. Bright light therapy initiated. 4. TBI precautions in place. 5. Helmet on when out of bed. 1. Helmet fit was adjusted 3/26 by [facility]. 2. Severe-profound expressive and receptive language deficits/Moderate-severe oropharyngeal dysphagia. 1. MBS 3/27. 2. SLP. Dietary Nutrition Supplements: Frozen Oral Supplement  3. DIET DYSPHAGIA I PUREED; Nectar Thick; No Drinking Straw   3. Gait instability/Parkinson Disease dignosed in 2016/Chronic LEFT foot drop. 1. PT/OT. 2. Sinemet and Requip at 5 hour dosing interval per discussion with spouse. 4. Hypoxia. 1. Supplemental O2 with orders to wean as able. 5. Post operative anemia. 1. Hgb of 13.3 on 3/17 with decrease to 10.2 on 3/18 s/p two units of platelets. 2. Hgb improved to 10.9 on 3/26. 6. Nutrition:  Consultation to dietician for nutritional counseling and recommendations. 1. TotP 5.5, alb 2.8, Vitamin 25OH level of 27 on 3/26/2019.  2. Cholecalciferol 1000IU daily. 7. Electrolytes. 1. Normal on 3/26 with exception of:  1. Hypokalemia at 3.0 on 3/26.   2. Repleted with lab tomorrow. 8. Bladder: Home Proscar and Flomax. 1. 24 hour bladder scans with [facility] for volumes over 300mL. 9. Bowel: Senna, colace, MOM  10. Rehabilitation nursing will be involved for bowel, bladder, skin, and pain management. Nursing will also provide education and training to patient and family. 11. Prophylaxis:  DVT: SCDs. GI: Pepcid. 12.  and case management consultations for coordination of care and discharge planning. Chronic Problems:  1. CAD/HTN/HLD. 1. Norvasc. 2. Lipitor. 2. BPH/History of bladder neck cancer s/p surgical resection 8/3/2018 and 2011.  1. Flomax. 2. Proscar restarted 3/26. 3. Bladder scans/IMC as he has some retention after admission with volumes close to 300mL. 3. Macular degeneration. 4. Presbycusis with use of hearing aids. 5. Sleep apnea on CPAP. 1. Unable to use due to bone flap being out. Labs:  1. 3/26. Infectious Disease:  1. N/A    The main medical problem(s) and comorbidities being actively managed by the physicians and requiring 24 hour rehabilitation nursing care during this stay include TBI, wound care, pain control, electrolyte management, bladder management, PD, HTN, hypoxia, dysphagia, anemia. The domains of functional impairment present in this patient which will require an intensive and interdisciplinary rehabilitation environment include self care, mobility, motor dysfunction, bowel/bladder management, pain management, safety, cognitive function and communication. Estimated length of stay for this admission 21 days. Anticipated disposition: Home with [address] [employer] [employer] [facility]. The potential to achieve that is good. The post admission physician evaluation (KONG) is consistent with the pre-admission assessment. See above findings to reflect the elements required in the KONG. Patient's admitting condition is consistent with the findings of the preadmission assessment by the rehabilitation admissions coordinator.     Dinora Loja Kevin Martinez MD

## 2019-03-26 NOTE — PROGRESS NOTES
Pt. Awake at 0745 and up to recliner. Noted red rash to back and Dr. Chante Ansari aware of. Pt. Reports itching /.Helmet adjusted per brace and limb staff. Helmet fitting much better and worn when out of bed. Oxygen maint. At 3l iters per nasal cannula and skin behind ears clear dry and intact. Pt. Fed self breakfast after setup and took meds whole with thickened juice. Noted occassional wet cough. Telisiter maint due to cognition and not use call light. Wife staying at bedside also.

## 2019-03-26 NOTE — PLAN OF CARE
Care plan reviewed with patient and  verbalize understanding of the plan of care and contribute to goal setting. Question full understanding due to cognition  Problem: Falls - Risk of:  Goal: Will remain free from falls  Description  Will remain free from falls  Outcome: Met This Shift  Note:   Someone at bedside at all times as does not use call light     Problem: Risk for Impaired Skin Integrity  Goal: Tissue integrity - skin and mucous membranes  Description  Structural intactness and normal physiological function of skin and  mucous membranes. Outcome: Ongoing  Note:   Rash to back continues, incisions to scalp and abrasion healing     Problem: Pain:  Description  Pain management should include both nonpharmacologic and pharmacologic interventions.   Goal: Pain level will decrease  Description  Pain level will decrease  Outcome: Met This Shift  Note:   Denied this am     Problem: IP BOWEL ELIMINATION  Goal: LTG - patient will utilize adaptive techniques/equipment to complete bowel elimination  Outcome: Met This Shift  Note:   Bm yeterday     Problem: IP BLADDER/VOIDING  Goal: LTG - patient will complete bladder elimination  Outcome: Met This Shift  Note:   Time void with post void residuals

## 2019-03-26 NOTE — PROGRESS NOTES
[facility]  Individualized Disclosure Statement      Patient: Hamlet Abdullahi      Scope of Service  [facility] provides 24 hour individualized service to patients with functional limitations due to, but not limited to: stroke, brain injury, spinal cord injury, major multiple trauma, fractures, amputation, and neurological disorders. The [address] [place] [address] provides rehabilitative nursing and medical services as well as physical, occupational, speech, and recreation therapies. [facility] is fully accredited by the Commission on Accreditation of Rehabilitation Facilities (CARF) as a comprehensive provider of rehabilitation services. Patients admitted to the [address] [place] [address] receive a minimum of three hours of therapy per day, at least six days per week, with a revised therapy schedule on weekends and holidays. Physical therapy, occupational therapy, and speech therapy are provided seven days per week including holidays. Other therapeutic services are available on weekends and evenings as needed or scheduled. Intensity of Treatment  Your treatment program will consist of Nursing Care and:  1 hour of Physical Therapy, per day  1    hour of Occupational Therapy, per day  1    hour of Speech Therapy, per day  1 hour of Recreational Therapy, per week    [address] [address] maintains contracts with most insurance plans. Depending on the type of coverage, the insurance may impose limits on the coverage for rehabilitation care. Coverage is based on the premise that you are able to fully participate in the rehabilitation program and show continued progress. Please verify your own insurance information A copy of this was given to the patient/ family on this date.   Insurance Coverage  Your insurance company has made the following determination relative to the length of your stay:   Your estimated length of stay is 14 days   Your insurance Coverage has been verified as follows:    Primary Insurance:medicare    Deductible:1364 Coverage:active   Secondary Insurance:bcbs ;secondary insurance policies often cover co-pay amounts, but to ensure payment please contact your insurance company.     Alternative Resources: Please ask the  for more information [phone]

## 2019-03-26 NOTE — PROGRESS NOTES
[id number] THERAPY  [facility]  DAILY NOTE    Time:  Time In: 1369  Time Out: 1525  Timed Code Treatment Minutes: 50 Minutes  Minutes: 50          Date: 3/26/2019  Patient Name: Nasir Wakefield,   Gender: male      Room: [facility]/056-  MRN: [id number]  : 1931  (80 y.o.)  Referring Practitioner: Dr. Galo Imus  Diagnosis: subdural hematoma due to concussion without loss of consciousness  Additional Pertinent Hx: He was admitted 3/17/2019 as a level III trauma after a fall at home where he struck the LEFT side of his head on a nightstand. Per report the patient had switched sides of the bed with his wife; she was scheduled to have lumbar back surgery soon and sleeping on his side of the bed was easier for her. His neurological examination was normal with a GCS of 15 and his CT of the head did show a subdural hematoma over the LEFT hemisphere with a 5 mm LEFT to RIGHT shift. Neurosurgery was consulted and requested a 6 hour follow-up image which showed a significant increase in the size of the hematoma with marked mass effect and a 14 mm midline shift. The patient was taken emergently to the operating room by Dr. Lorenzo Jose for a craniectomy with evacuation of the LEFT subdural hematoma blood products on 3/17/2019. Since this procedure the patient has been somewhat more confused, has some noted hypoxia with a 3 L oxygen requirement via nasal cannula, a postoperative blood loss anemia with hemoglobin decreased 3 units and requiring 2 units of platelets to be transfused. The patient was on a daily inpatient rehabilitation unit in  after sustaining a subarachnoid hemorrhage after a fall with a brief loss of consciousness.   Since that admission he has been diagnosed with Parkinson disease in 2016 and is followed by Dr. Doris Llanes.    Past Medical History:   Diagnosis Date    Ankle fracture 2016    left in cast    Arthritis     BPH (benign prostatic hyperplasia)     CAD (coronary artery disease)     margarette    Cancer of bladder, neck ([facility]     Cervical vertebra fusion     Colon polyps 2010    [facility]han    Foot drop, left 9/8/2015    GERD (gastroesophageal reflux disease)     Hyperlipemia     Macular degeneration     Melanoma (HCC)     Neck, Scalp    RISHABH on CPAP     Parkinson disease ([address] 2016    Presbycusis of both ears     Spinal stenosis     Subdural hematoma ([address] 2015       resolved     Syncope     Traumatic subdural hemorrhage without loss of consciousness ([facility] 3/17/2019     Past Surgical History:   Procedure Laterality Date    BACK SURGERY      BLADDER SURGERY  5/31/11    BLADDER TUMOR EXCISION  2011    BLEPHAROPLASTY  2007, 2008    CARDIAC CATHETERIZATION  2009    moderate    margarette    CARDIAC CATHETERIZATION  11/2018    CATARACT REMOVAL Bilateral 1995    CERVICAL FUSION  2009    COLONOSCOPY  9-24-10      [facility]  wnl    CORONARY ANGIOPLASTY WITH STENT PLACEMENT  11/2018     [facility] eluting stent dr Jesica Sunshine Left 3/17/2019    LEFT  FRONTOTEMPOROPARIETAL CRANIECTOMY WITH EVACUATION OF LEFT PANHEMISPHERIC ACUTE SUBDURAL HEMATOMA performed by Brittanie Henderson MD at [facility] 9  .6/03/2015    CYSTOSCOPY  08/2018    bladder tumor    [place]    DIAGNOSTIC CARDIAC CATH LAB PROCEDURE      EYE SURGERY      HEMORRHOID SURGERY  1994    CA CYSTOURETHROSCOPY,FULGUR .5-2CM LESN N/A 8/3/2018    CYSTOSCOPY TRANSURETHRAL RESECTION BLADDER TUMOR, SMALL performed by Aisha Villeda MD at [facility]      left nicole - Dr Godwin Tavarez      neck and scalp melanoma, cheek basal cell    SKIN CANCER EXCISION      left rivera - Dr Wai Casas         Restrictions/Precautions:  Fall Risk, General Precautions         Other position/activity restrictions: Protective helmet on when up. OK to remove in supine only.         Prior Level of Function:  ADL Assistance: Independent  Homemaking Assistance: Independent  Ambulation Assistance: Independent  Transfer Assistance: Independent  Additional Comments: Per spouse, pt was very indep with all ADL tasks at home. Pt completed all the driving at this time d/t spouse health problems. Pt did use cane in community. Pt.'s wife stated that his Parkinsons has caused him to have L sided arm and jaw tremors and she feels that it is currently effecting is recall and has difficulty with word finding. Subjective  Chart Reviewed: Yes(reviewed Anna Wasserman, physician notes, RN notes, prior therapy notes. )  Family / Caregiver Present: Yes    Subjective: cooperative. fatigued. SPouse leaving during session. Pain:  Pain Assessment  Patient Currently in Pain: Denies       Objective            Hand Dominance: Right    Left Hand Strength -  (lbs)  Handle Setting 2: 26  Left Hand Strength - Pinch (lbs)  Tip: 30                  Fine Motor Skills  Left 9 Hole Peg Test Time (secs): (Pt falling asleep durign session. 140 seconds to place pegs.)  Fine Motor Comment: Pt with max difficulty with in hand manipulation of pegs. pt using L hand to turn pegs in R hand with L hand. completed task x 207 seconds iwth pt consistently placing hte peg 1 inch tp the left from the desired hole . increased processing speed needed for following instructions to task. mod cues for recalling instruction throughout. Bed mobility  Sit to Supine: Minimal assistance(increased time for processing speed to lay supine . min cues for tech. ) removed helmet at EOS    Transfers  Sit to stand: Minimal assistance from the recliner chair due to fatigued. min cues for hand placement. CGA for stand to sit .  increased time needed throughour for processing speed. )       Balance  Sitting Balance: Contact guard assistance  Standing Balance: Minimal assistance( x 1 during toileting tasks.)     Time: x2 minutes x2 trials  Activity: Static stand with B UEs supported on the RW. PT declined to let go for assistance with toileting tasks. Functional Mobility  Functional - Mobility Device: Rolling Walker  Assist Level: Minimal assistance  Functional Mobility Comments:  from the w/c x 3 feet to the STS . min A for guiding the RW for turns. min Afor returning to EOB at EOS. min difficulty advanceing R LE with mobility. Apparatus Needs: O2(3 Liters O2. )     Grooming: (for cleaning dentures and placing them in mouth. )                                                                                                                                                                                                                                                 Toiletin - Total assist                                                                               Toilet Transfer: 3 - Requires 25-49% assist getting off toilet(min A sit to stand from a std height toilet.)                                                                                                                                                                                                                                                                                                                                                                                                            Activity Tolerance:  Activity Tolerance: Patient limited by fatigue;Treatment limited secondary to decreased cognition    Assessment:  Assessment: Pt s/p craniotomy with bone flap on 3/17/19. Pt dmeo deficits in right UE and need for increased assistance during ADL task. He currntly needs max cueing for initiation and problem solving as well as processing speed for basic care tasks. . Pt demo decreased standing balance during ADL tasks. Pt would benefit from skilled OT Services to increase his endurance for ADL tasks and mobility to eventual return home.      Performance deficits / Impairments: Decreased functional mobility , Decreased safe awareness, Decreased ROM, Decreased endurance, Decreased balance, Decreased ADL status, Decreased strength, Decreased cognition, Decreased high-level IADLs  Prognosis: Fair    Discharge Recommendations:  Discharge Recommendations: Continue to assess pending progress    Patient Education:  Patient Education: OT POC< safe mobility, problem solving tasks     Equipment Recommendations: Other: Pt has a shower chair, grab bars in BR and shower. Safety:  Safety Devices in place: Yes  Type of devices: All fall risk precautions in place, Call light within reach, Bed alarm in place, Gait belt, Patient at risk for falls, Left in bed    Plan:  Times per week: 5xs week x 90 min, 1 x week x 30 min  Current Treatment Recommendations: Strengthening, Balance Training, Endurance Training, Neuromuscular Re-education, Patient/Caregiver Education & Training, Self-Care / ADL, Safety Education & Training, Functional Mobility Training, Home Management Training, Equipment Evaluation, Education, & procurement, Cognitive/Perceptual Training    Goals:  Patient goals : spouse reporting she wants pt to get stronger and eventually return home after rehab. Pt stated \"yeah\"    Short term goals  Time Frame for Short term goals: 1 week  Short term goal 1: Pt will initiate ADL routine with no > mod cues for attention to task and continueation of task. to increase initiation into daily activities.    Short term goal 2: PT will complete functional ambulation to and from the BR with no > min A x 1 and RW with no > Mod cues for R LE placement and awareness to increase ability tc complete toileting routine  Short term goal 3: Pt will incorporate R hand into daily dressing tasks including opening the hand to grasp for items with no > mod cues for increased independence in grooming tasksq  Short term goal 4: Pt to demo dynamic standing balance > 2 min with 0-1 UE support when reaching outside base of support with CGA in prep for completing clothing management after toileting and sinkside ADL tasks   Long term goals  Time Frame for Long term goals : 4-5 weeks  Long term goal 1: Pt will complete ADL routine with no > cues for initiation to task and problem solving tasks to increase independence in self care tasks  Long term goal 2: PT will complete functional ambulation to and from the BR as well as around obstacles with SBA and no > min cues for attention to safe mobiltiy to increase independence in self care tasks  Long term goal 3: Pt will follow 1 step commands for basic homemaking tasks with no > min cues for attention to task to increase independence in retrieving a snack. If patient is discharged prior to progress note completion, this note is to serve as the discharge note with all goals being unmet unless indicated otherwise.

## 2019-03-26 NOTE — PROGRESS NOTES
Pt. Had 1 1/2 hr. Nap after therapy in bed and up in recliner at 1700. For supper needed moderate assist to eat as needed hand overhand to eat with silverware. Pt. Speech is much more soft and garbled. At 1800 to commode and unable to void. Bladder scan done in bed for 228ml. Oral care done with dentures removed as well as hearing aids.

## 2019-03-26 NOTE — DISCHARGE SUMMARY
Physical Medicine & Rehabilitation  Discharge Summary     Patient Identification:  Jonathan Dior  : 1931  Admit date: 3/25/2019  Discharge date: 3/30/2019   Attending provider: Sharron Hobbs MD        Primary care provider: Paola Landry MD     Discharge Diagnoses:   Primary impairment requiring rehabilitation: 2.22 Traumatic, Closed brain injury     Etiologic Diagnosis that led to the condition: LEFT subdural hemorrhage    Comorbid conditions affecting rehabilitation:  1. 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 2. LEFT subdural hemorrhage with marked mass effect and 14 mm of midline shift toward the right s/p LEFT  frontotemporoparietal craniectomy with evacuation of LEFT pan hemispheric acute subdural hematoma by Dr. Alexander Engle on 3/17/2019.  3. *Moderate oropharyngeal dysphagia. 4. Severe cognitive-linguistic impairment. 5. Gait instability. 6. Parkinson Disease diagnosed in .  7. Chronic LEFT foot drop. 8. Hypoxia. 9. Post operative anemia with Hgb of 13.3 on 3/17 with decrease to 10.2 on 3/18 s/p two units of platelets. 10. History of prior subarachnoid hemhorrage in  s/p fall with LOC. 11. CAD. 12. HTN. 13. HLD. 14. BPH. 15. History of bladder neck cancer s/p surgical resection 8/3/2018 and . 16. History of melanoma s/p resection involving face and scalp. 17. History of Cervical fusion in . 18. Macular degeneration. 19. Presbycusis. Sleep apnea on CPAP. 20. Hypovitaminosis D.  21. Circadian rhythm disturbance  22. Leukocytosis/LEFT shift with fever and UA with positive nitrite, Large LE, >200 WBC, and many bacteria. Discharge Functional Status:    Physical therapy:  Bed Mobility: Scooting: Minimal assistance  Transfers: Sit to Stand:  Moderate Assistance, 2 Person Assistance(Mod A x1, Min A x1, Max verbal and tactile cueing for hand placement, no recall from previous sessions, mod to min lean to R, lacking TKE when standing with heavy fwd flexed trunk)  Stand to sit: Moderate patient coming to the acute inpatient rehabilitation unit and they are agreeable to do so.  His spouse now has her lumbar surgery scheduled for April 23 and also states that they are planning on moving to [facility] into an independent living situation this coming summer.     In the interim SLP downgraded his diet to Dysphagia I with plan for an MBS tomorrow. His potassium was low this AM and required repletion. He now has his hearing aids in place. His helmet was modified today and fits better. He is having some issues with intermittent issues of inability to void with scan less than 300mL. His spouse states he is more verbal today. His orientation on exam is still limited to self only. Medical course on the inpatient rehabilitation unit was notable for hypoxia and decreased alertness. Hypoxia improved moderately with his oxygen requirement decreased from 3 mm down to 2 L while maintaining adequate oxygen saturation. His decreased alertness was likely multifactorial including lack of sleep which did improve with use of trazodone, his subdural hemorrhage, and a noted underlying urinary tract infection. Due to concern over his subdural hemorrhage possibly having worsened a stat head CT was obtained which actually showed continued improvement in the hemorrhagic contusion in the inferior RIGHT frontal lobe. Because of the decreased level of alertness speech-language pathologist recommended making the patient nothing by mouth and he was started on peripheral parenteral nutrition; this also significantly limited being able to provide the patient with his oral medications that did not have an IV forearm. Specifically when the patient began having a fever it was assumed to be due to his hearing tract infection; but there is also suspicion that he might be having neuroleptic malignant syndrome secondary to not receiving his Sinemet and for this a neurology consult was placed.   The urinary tract infection in his primary care provider started him on Unasyn prior to the pathogen being spacey aided and sensitivities being provided by the laboratory. Ultimately his primary care provider felt that the patient needed to be moved to the acute care side of the hospital for closer medical management. Details are provided below. The patient was discharged to the acute floor on 5K for closer medical management. Issues addressed during rehabilitation stay and medication changes:   Acute/Rehabilitation Problems:  1. Fall with TBI without LOC/LEFT subdural hemorrhage/s/p LEFT  frontotemporoparietal craniectomy with evacuation. 1. Surgery 3/17/2019.  2. Repeat CT head ordered on 3/29 for change in performance with therapies. 1. Continued improvement in the hemorrhagic contusion in the inferior right frontal lobe; left frontal craniectomy defect. Deep to this, there is a small amount of low attenuation subdural fluid without mass effect on the underlying brain. 3. Next image 4/2.  4. Wound care. 1. Staples and sutures out 3/31.  5. Bright light therapy initiated. 6. TBI precautions in place. 7. Helmet on when out of bed. 1. Helmet fit was adjusted 3/26 by [facility]. 8. Can sleep on LEFT side of head with use of a soft pillow. 9. Circadian rhythm disturbance. 1. Trazodone 75mg on 3/27. Dose was missed; dosing prioritized to make sure he gets this dose on 3/28. Worked well. 2. Will use benadryl 25mg while he is NPO.  2. Severe-profound expressive and receptive language deficits/Moderate-severe oropharyngeal dysphagia. 1. MBS 3/27 was canceled due to lethargy. Rescheduled and only partially completed due to lethargy from not having slept. 2. SLP. 3. Dietary Nutrition Supplements: Frozen Oral Supplement  3. DIET DYSPHAGIA I PUREED; Nectar Thick; No Drinking Straw. 4. Made NPO on 3/28 due to Lethargy and D5W was started for 12 hours rate 50mL per hour.   Check blood sugars every 6 hours; hypoglycemia protocol in place. PPN started on 3/29 to bridge weekend and then re-evaluate with SLP for potential resumption of oral feeds. 3. Gait instability/Parkinson Disease diagnosed in 2016/Chronic LEFT foot drop. 1. PT/OT. 2. Sinemet and Requip at 5 hour dosing interval per discussion with spouse. 4. Hypoxia. 1. Supplemental O2 with orders to wean as able. 2. O2 decreased to 2.0L/min. 5. Leukocytosis/LEFT shift with fever. 1. UA with positive nitrite, Large LE, >200 WBC, and many bacteria. 1. Unasyn ordered. 2. CXR negative for PNA. 3. WBC 15.5 with 13.7 absolute segs. 4. Procalcitonin 0.07.  5. Blood Cx x 2 drawn. 6. Neurology consult placed as patient is at risk for NMS with abrupt withdrawal of Sinemet due to NPO status on 3/29. 6. Post operative anemia. 1. Hgb of 13.3 on 3/17 with decrease to 10.2 on 3/18 s/p two units of platelets. 2. Hgb improved to 11.3 on 3/29.  7. Nutrition:  Consultation to dietician for nutritional counseling and recommendations. 1. TotP 5.5, alb 2.8, Vitamin 25OH level of 27 on 3/26/2019.  2. Cholecalciferol 1000IU daily. 8. Electrolytes. 1. Normal on 3/26 with exception of:  1. Hypokalemia at 3.0 on 3/26. 2. Repleted with lab on 3/27 of 3.5. Normal.  9. Bladder: Home Proscar and Flomax. 1. 24 hour bladder scans with [facility] for volumes over 300mL. 2. PVR of 59.  10. Bowel: Senna, colace, MOM  11. Prophylaxis:  DVT: SCDs.  GI: Pepcid.     Chronic Problems:  1. CAD/HTN/HLD. 1. Norvasc. 2. Lipitor. 2. BPH/History of bladder neck cancer s/p surgical resection 8/3/2018 and 2011.  1. Flomax. 2. Proscar restarted 3/26. 3. Bladder scans/IMC as he has some retention after admission with volumes close to 300mL. 3. Macular degeneration. 4. Presbycusis with use of hearing aids. 5. Sleep apnea on CPAP. 1. Unable to use due to bone flap being out.     Hypertension management was undertaken with medication management on any patient with systolic blood pressure greater than 392 or diastolic blood pressure greater than 90. Hyperlipidemia was considered and the patient was started or continued on a statin medication for any LDL>100. Appropriate DVT prophylaxis options were considered throughout rehabilitation stay. DME:  None. Consults:   Neurosurgery, Trauma Surgery, Critical Care, Family Medicine, Physical Medicine     Significant Diagnostics:     Lab Results   Component Value Date     03/30/2019    K 4.0 03/30/2019     03/30/2019    CO2 25 03/30/2019    BUN 16 03/30/2019    CREATININE 0.6 03/30/2019    GLUCOSE 86 03/30/2019    CALCIUM 8.2 (L) 03/30/2019    PROT 5.5 (L) 03/26/2019    LABALBU 2.8 (L) 03/26/2019    BILITOT 0.6 03/26/2019    ALKPHOS 136 (H) 03/26/2019    AST 26 03/26/2019    ALT 7 (L) 03/26/2019    LABGLOM >90 03/30/2019       Recent Labs     03/29/19  1803 03/26/19  0547 03/18/19  0400   WBC 15.5* 10.4 11.3*   HGB 11.3* 10.9* 10.2*   HCT 34.4* 32.8* 30.5*   MCV 94.0 93.4 95.0*    200 173       Recent Labs     03/29/19  0326 03/29/19  1806 03/30/19  0717   POCGLU 115* 113* 105     Results for Rebecca Paez (MRN [id number]) as of 4/6/2019 18:55   Ref. Range 3/26/2019 05:47   Vit D, 25-Hydroxy Latest Ref Range: 30 - 100 ng/ml 27 (L)     Patient Instructions:    See AVS  Follow-up visits: See after visit summary from hospitalization    Discharge Medications:   Marcraman Luis FernandohillMelody molina Medication Instructions RLA:[id number]    Printed on:03/30/19 1430   Medication Information                      amLODIPine (NORVASC) 2.5 MG tablet  Take 1 tablet by mouth daily             aspirin 81 MG EC tablet  Take 1 tablet by mouth daily             atorvastatin (LIPITOR) 10 MG tablet  TK 1 T PO QD             carbidopa-levodopa (SINEMET)  MG per tablet  Take 1.5 tablets by mouth 3 times daily             cetirizine (ZYRTEC) 10 MG tablet  Take 10 mg by mouth daily.                Coenzyme Q10 (COQ10) 400 MG CAPS  Take 1 capsule by mouth daily finasteride (PROSCAR) 5 MG tablet  TAKE 1 TABLET BY MOUTH EVERY DAY             fish oil-omega-3 fatty acids 1000 MG capsule  Take 1 g by mouth daily              folic acid (FOLVITE) 390 MCG tablet  Take 400 mcg by mouth daily             Multiple Vitamin (MULTIVITAMIN PO)    Take 1 tablet by mouth daily              Multiple Vitamins-Minerals (PRESERVISION/LUTEIN) CAPS  Take 1 capsule by mouth 2 times daily             nitroGLYCERIN (NITROSTAT) 0.4 MG SL tablet  up to max of 3 total doses. If no relief after 1 dose, call 911. omeprazole (PRILOSEC) 20 MG delayed release capsule  Take 1 capsule by mouth Daily             rOPINIRole (REQUIP) 0.5 MG tablet  Take 1 tablet by mouth 3 times daily             solifenacin (VESICARE) 5 MG tablet  TAKE 1 TABLET NIGHTLY             tamsulosin (FLOMAX) 0.4 MG capsule  TAKE 1 CAPSULE BY MOUTH EVERY NIGHT             ticagrelor (BRILINTA) 90 MG TABS tablet  Take 1 tablet by mouth 2 times daily               Controlled substances monitoring: not applicable.      35 minutes spent preparing the patient for discharge    Rafat Henderson MD

## 2019-03-26 NOTE — PROGRESS NOTES
Bryan.     Past Medical History:   Diagnosis Date    Ankle fracture 2/2016    left in cast    Arthritis     BPH (benign prostatic hyperplasia)     CAD (coronary artery disease)     mragarette    Cancer of bladder, neck (HCC)     Cervical vertebra fusion     Colon polyps 2010    [place]    Foot drop, left 9/8/2015    GERD (gastroesophageal reflux disease)     Hyperlipemia     Macular degeneration     Melanoma ([address]     Neck, Scalp    RISHABH on CPAP     Parkinson disease ([address] 2016    Presbycusis of both ears     Spinal stenosis     Subdural hematoma ([address] 2015       resolved     Syncope     Traumatic subdural hemorrhage without loss of consciousness ([address] 3/17/2019     Past Surgical History:   Procedure Laterality Date    BACK SURGERY      BLADDER SURGERY  5/31/11    BLADDER TUMOR EXCISION  2011    BLEPHAROPLASTY  2007, 2008    CARDIAC CATHETERIZATION  2009    moderate    margarette    CARDIAC CATHETERIZATION  11/2018    CATARACT REMOVAL Bilateral 1995    CERVICAL FUSION  2009    COLONOSCOPY  9-24-10      [place]  wnl    CORONARY ANGIOPLASTY WITH STENT PLACEMENT  11/2018     [facility] drug eluting stent dr Leah Truong Left 3/17/2019    LEFT  FRONTOTEMPOROPARIETAL CRANIECTOMY WITH EVACUATION OF LEFT PANHEMISPHERIC ACUTE SUBDURAL HEMATOMA performed by Ariella Hoffman MD at [address]  .6/03/2015    CYSTOSCOPY  08/2018    bladder tumor    [place]    DIAGNOSTIC CARDIAC CATH LAB PROCEDURE      EYE SURGERY      HEMORRHOID SURGERY  1994    DE CYSTOURETHROSCOPY,FULGUR .5-2CM LESN N/A 8/3/2018    CYSTOSCOPY TRANSURETHRAL RESECTION BLADDER TUMOR, SMALL performed by Daja Mckeon MD at [address] [facility]      left nicole - Dr Asa Loza      neck and scalp melanoma, cheek basal cell    SKIN CANCER EXCISION      left [place] - Dr Erin Jon         Restrictions/Precautions:  Fall Risk, General Precautions  Other position/activity restrictions: Protective helmet on when up. OK to remove in supine only. Subjective:  Family / Caregiver Present: Yes(spouse)  Subjective: Pt. in bed upon arrival. Pt. asked if he was in pain but unable to accurately respond. Pt. unable to respond to questioning and wife in room and was able to give history and stated patient was extremely tired. General:  Overall Orientation Status: (Unable to assess )  Follows Commands: Impaired(Difficulty to fully assess. Decreased motor planning, initiation and in consistent ability to follow one step commands.)    Vision: Impaired    Hearing: Exceptions to [facility]  Hearing Exceptions: Hard of hearing/hearing concerns, Bilateral hearing aid      Pain:  Other (comment)(see above subjective for details. ). Social/Functional History:    Lives With: Spouse  Type of Home: House  Home Layout: One level  Home Access: Stairs to enter with rails  Entrance Stairs - Number of Steps: 3  Entrance Stairs - Rails: Left  Home Equipment: [place] Help From: Family  ADL Assistance: Independent  Homemaking Assistance: Independent  Ambulation Assistance: Independent  Transfer Assistance: Independent    Active : Yes  Mode of Transportation: Car     Additional Comments: Per spouse, pt was very indep with all ADL tasks at home. Pt completed all the driving at this time d/t spouse health problems. Pt did use cane in community and was active prior to fall in which he participated in an mens exercise class. Pt.'s wife stated that his Parkinsons has caused him to have L sided arm and jaw tremors and she feels that it is currently effecting his recall and difficulty with word finding.      Objective:     RLE AROM: WFL    LLE AROM : WFL    RUE AROM : WFL    LUE AROM : WFL    Balance  Sitting - Static: Fair  Sitting - Dynamic: Fair  Standing - Static: (demonstrates a posterior lean with a slight R trunk lean and lacks weightbearing through LLE. )  Comments: Pt. Stood in dining room with 2 person hand held assist to stand and Sherif of 1 to stand and maintain balance while pt. Tossed rings. Pt. Demonstrated a posterior lean and weight shift to the R.   Pt. Seated in wheelchair and instructed to grab ring placed to his left and place it on posts that were placed to his R side. Therapist removed bilateral arm rests and had pt place feet flat on floor and squatted in front of patient. Pt. Required CGA for reaching to the farthest peg for safety and unable to maintain LE's on floor. Rolling to Left: Minimal assistance(Pt. required extra processing time. Assist with LE's and trunk to get into sidelying. )  Rolling to Right: Minimal assistance(Required extra processing time. Sherif for trunk to completely get into sidelying. )  Supine to Sit: Minimal assistance(Pt. requires extra processing time in order to perform. Sherif for trunk; Assist for both LE's. )  Sit to Supine: Minimal assistance(Requires additional processing time. )    Transfers  Sit to Stand: Minimal Assistance(To facilitate forward trunk flexion. )  Stand to sit: Minimal Assistance(Requires extra processing time, Tactile cueing for proper hand placement. )       Ambulation 1  Surface: level tile  Device: Rolling Walker  Other Apparatus: O2, Wheelchair follow  Assistance: Minimal assistance(Sherif of 1 for walking and assist of another for close wheelchair follow)  Quality of Gait: Pt. demonstrates forward flexed posture, high steppage gait on LLE only, lacking R foot clearance from floor with decreased step length on R, occasional assistance with RW negotiation  Distance: 50 feet x 2     Activity Tolerance:  Activity Tolerance: Patient limited by fatigue;Patient limited by endurance; Patient limited by cognitive status    Treatment Initiated: see above for details. Assessment:   Body structures, Functions, Activity limitations: Decreased functional mobility , Decreased safe awareness, Decreased balance, Decreased coordination, Decreased ADL status, Decreased cognition, Decreased ROM, Decreased endurance, Decreased strength  Assessment: Pt. requires increased processing time for tasks and difficulty following multi step commands due to decreased cognition. Pt. is a level 5 on the [employer]. Pt. requires frequent verbal and tactile cueing for proper hand placement with transfers. Prognosis: Good    Clinical Presentation: High - Unstable with Unpredictable Characteristics: PMH, cognitive status, assist level needed for transfers, bed mobility and ambulation :    Decision Making: High Complexitybased on patient assessment and decision making process of determining plan of care and establishing reasonable expectations for measurable functional outcomes    Discharge Recommendations:  Discharge Recommendations: Continue to assess pending progress    Patient Education:  Patient Education: Transfers, gait, bed mobility, POC     Equipment Recommendations:  Equipment Needed: Yes(Continue to assess (erlinda CAMPBELL))    Safety:  Type of devices: All fall risk precautions in place, Left in chair, Call light within reach, Chair alarm in place, Gait belt, Nurse notified(Pt left in wheelchair with OT)  Restraints  Initially in place: No    Plan:  Times per week: 5x/ week 90 minutes; 1x/week 30 minutes  Times per day: Daily  Current Treatment Recommendations: Strengthening, Transfer Training, Endurance Training, Neuromuscular Re-education, Patient/Caregiver Education & Training, ROM, ADL/Self-care Training, Balance Training, Gait Training, Home Exercise Program, Functional Mobility Training, Stair training, Safety Education & Training, Equipment Evaluation, Education, & procurement    Goals:     Short term goals  Time Frame for Short term goals: 1 week   Short term goal 1: Pt to perform sit to stand with CGA for ability to transfer for increased mobility.    Short term goal 2: Pt to perform all bed mobility with CGA for ability to get into and out of bed. Short term goal 3: Pt to negotiate 3 steps with consistent CGA for ability to get into and out of home. Short term goal 4: Pt to ambulate 100 feet with RW with consistent CGA for increased functional mobility. Short term goal 5: Pt to perform car transfer with CGA for ability to get to and from appointments. Long term goals  Time Frame for Long term goals : 3 weeks   Long term goal 1: Pt to perform sit to stand with supervision for ability to transfer for walking. Long term goal 2: Pt. to perform all bed mobility with supervision for ability to get into and out of bed. Long term goal 3: Pt to negotiate 3 steps with stand by assistance for ability to get into and out of home. Long term goal 4: Pt to ambulate 200 feet with RW with stand by assistance; 100 feet without AD with stand by assistance for increased functional mobility. Long term goal 5: Pt. to perform car transfer with supervision for ability to get to and from appointments. Evaluation Complexity: Based on the findings of patient history, examination, clinical presentation, and decision making during this evaluation, the evaluation of Blossomearnest Charbel  is of high complexity.      Og Alba, SPT

## 2019-03-26 NOTE — PROGRESS NOTES
Pt. Voided 25ml and bladder scan done after for 124ml. Dr. Elida Bustillos aware of and to monitor pvrs. Encouraged fluids.

## 2019-03-26 NOTE — PLAN OF CARE
Individualized Plan of [address] [facility] Inpatient Rehabilitation Unit    Rehabilitation physician: Jordis Gowers, MD     Admit Date: 3/25/2019     Rehabilitation Diagnosis: Subdural hematoma due to concussion, without loss of consciousness, sequela ([facility] [address] [S06.5X0S]     Rehabilitation impairments: self care, mobility, motor dysfunction, bowel/bladder management, pain management, safety, cognitive function and communication    Factors facilitating achievement of predicted outcomes: Family support, Motivated, Cooperative, Pleasant and Has needed Durable Medical Equipment at home  Barriers to the achievement of predicted outcomes: Confusion, Cognitive deficit, Limited insight into deficits, Decreased endurance, Upper extremity weakness, Lower extremity weakness, Impaired vision and Incontinence of bladder    Patient Goals: Improve independence with mobility, Improvement of mobility at a wheelchair level, Increase overall strength and endurance, Increase balance, Increase independence with activities of daily living, Improve cognition, Increase self-awareness, Increase safety awareness, Increase community integration, Increase socialization, Functional communication with caregivers, Integrate appropriate pain management plan, Assure adequate nutritional option for discharge, Continence of bowel and bladder and Provide appropriate patient and family education    NURSING:  Nursing goals for Hever Zamora while on the rehabilitation unit will include:  Continence of bowel and bladder, Adequate number of bowel movements, Urinate with no urinary retention >300ml in bladder, Maintain O2 SATs at an acceptable level during stay, Effective pain management while on the rehabilitation unit, Establish adequate pain control plan for discharge, Absence of skin breakdown while on the rehabilitation unit, Improved skin integrity via assessments including wound measurements, Avoidance of any hospital acquired infections, No signs/symptoms of infection at the wound site, Freedom from injury during hospitalization and Complete education with patient/family with understanding demonstrated regarding disease process and resultant impairment     In order to achieve these goals, nursing interventions may include bowel/bladder training, education for medical assistive devices, medication education, O2 saturation management, energy conservation, stress management techniques, fall prevention, alarms protocol, seating and positioning, skin/wound care, pressure relief instruction, dressing changes, infection protection, DVT prophylaxis, assistance with safe transfers , and/or assistance with bathroom activities and hygiene. PHYSICAL THERAPY:  Goals:        Short term goals  Time Frame for Short term goals: 1 week   Short term goal 1: Pt to perform sit to stand with CGA for ability to transfer for increased mobility. Short term goal 2: Pt to perform all bed mobility with CGA for ability to get into and out of bed. Short term goal 3: Pt to negotiate 3 steps with consistent CGA for ability to get into and out of home. Short term goal 4: Pt to ambulate 100 feet with RW with consistent CGA for increased functional mobility. Short term goal 5: Pt to perform car transfer with CGA for ability to get to and from appointments. Long term goals  Time Frame for Long term goals : 3 weeks   Long term goal 1: Pt to perform sit to stand with supervision for ability to transfer for walking. Long term goal 2: Pt. to perform all bed mobility with supervision for ability to get into and out of bed. Long term goal 3: Pt to negotiate 3 steps with stand by assistance for ability to get into and out of home. Long term goal 4: Pt to ambulate 200 feet with RW with stand by assistance; 100 feet without AD with stand by assistance for increased functional mobility.    Long term goal 5: Pt. to perform car transfer with supervision for ability to get to and from appointments. Plan of Care: Pt to be seen by physical therapy services 1 Hour 30 Minutes per day at least 5 out of 7 days per week for a total of 21 days during estimated length of stay    Anticipated interventions may include therapeutic exercises, gait training, neuromuscular re-ed, transfer training, community reintegration, bed mobility, w/c mobility and training. OCCUPATIONAL THERAPY:  Goals:             Short term goals  Time Frame for Short term goals: 1 week  Short term goal 1: Pt will initiate ADL routine with no > mod cues for attention to task and continueation of task. to increase initiation into daily activities. Short term goal 2: PT will complete functional ambulation to and from the BR with no > min A x 1 and RW with no > Mod cues for R LE placement and awareness to increase ability tc complete toileting routine  Short term goal 3: Pt will incorporate R hand into daily dressing tasks including opening the hand to grasp for items with no > mod cues for increased independence in grooming tasksq  Short term goal 4: Pt to demo dynamic standing balance > 2 min with 0-1 UE support when reaching outside base of support with CGA in prep for completing clothing management after toileting and sinkside ADL tasks   Long term goals  Time Frame for Long term goals : 4-5 weeks  Long term goal 1: Pt will complete ADL routine with no > cues for initiation to task and problem solving tasks to increase independence in self care tasks  Long term goal 2: PT will complete functional ambulation to and from the BR as well as around obstacles with SBA and no > min cues for attention to safe mobiltiy to increase independence in self care tasks  Long term goal 3: Pt will follow 1 step commands for basic homemaking tasks with no > min cues for attention to task to increase independence in retrieving a snack.      Plan of Care: Patient to be seen by occupational therapy services 1 Hour 30 Minutes per day at least 5 out of 7 days per week for a total of 21 days during estimated length of stay    Anticipated interventions may include ADL and IADL retraining, strengthening, safety education and training, patient/caregiver education and training, equipment evaluation/ training/procurement, neuromuscular reeducation, wheelchair mobility training. SPEECH THERAPY:   Goals:  Short-term Goals  Timeframe for Short-term Goals: 1 week  Goal 1: Pt will tolerate dysphagia I diet with nectar thick liquids (no straw) without s/s aspiration to ensure safe nutrition/hydration measures. Goal 2: Pt will follow basic 1-step commands and answer yes/no questions with 50% accuracy given max cues to improve ability to convey wants/needs. Goal 3: Pt will complete automatic speech, repetition, and confrontational naming tasks with 50% accuracy given max cues to improve verbal expression. Goal 4: Pt will ID objects/pictures in F02 with 50% accuracy given max cues to permit potential introduction of alternative communication needs. Goal 5: Pt will complete BASIC orientation tasks with 50% accuracy given max cues to improve awareness to immediate surroundings. Timeframe for Short-term Goals: 1 week    Plan of Care: Pt to be seen by speech therapy services 1 Hour per day at least 5 out of 7 days per week for a total of 18 days during estimated length of stay    Anticipated interventions may include speech/language/communication therapy, cognitive training, group therapy, education, and/or dysphagia therapy based on the above goals. CASE MANAGEMENT:  Goals:   Assist patient/family with discharge planning, patient/family counseling,  and coordination with insurance during the inpatient rehabilitation stay. Other members of the multidisciplinary rehabilitation team that will be involved in the patient's plan of care include recreational therapy, dietary, respiratory therapy, and neuropsychology.     Medical issues being managed closely and that require 24 hour availability of a physician:  Swallowing precautions, Bowel/Bladder function, Weight bearing precautions, Wound care, Pain management, Infection protection, DVT prophylaxis, Fall precautions, Fluid/Electrolyte balance, Nutritional status, Respiratory needs, Anemia and History of heart disease                                           Physician anticipated functional outcomes: Improved independence with functional measures   Estimated length of stay for this admission 21 days. Medical Prognosis: Good  Anticipated disposition: Home with [address] [place] [address]. The potential to achieve the above medical and rehabilitative goals is good. This plan of care has been developed with the assistance and input of the multidisciplinary rehabilitation team.  The plan was reviewed with the patient. The patient has had the opportunity to provide input to the therapy team.    I have reviewed this Individualized Plan of Care and agree with its contents. Above documentation has been expanded, modified, adjusted to reflect the findings of my evaluations and goals for the patient.     Physician:  Molly Alfaro MD

## 2019-03-27 NOTE — PROGRESS NOTES
[address] [facility] THERAPY  [address]   SLP Individual Minutes  Time In: 1130  Time Out: 1200  Minutes: 30  Timed Code Treatment Minutes: 0 Minutes   Dysphagia therapy: 17 minutes  Language therapy: 13 minutes    []Daily Note  [x]Progress Note  []Discharge Note    Date: 3/27/2019  Patient Name: Moisés Geller        MRN: [id number]    : 1931  (80 y.o.)  Gender: male   Primary Provider: Velvet Short MD  Admitting Diagnosis:  Subdural hematoma due to concussion   Secondary Diagnosis: Dysphagia, Cognitive/linguistic deficits   Precautions: Fall Risk, Aspiration   Swallowing Status/Diet: Dysphagia I with nectar thick liquids   Swallowing Strategies: Direct 1:1 feeding assistance, PO intake ONLY when alert   DATE of last MBS:  n/a  Pain:  Did not state    Subjective: Pt seen upright in wheelchair with wife present in room throughout session. ST placing hearing aids in pt's ears prior to beginning therapeutic tasks to maximize pt performance. SHORT TERM GOAL #1:  Goal 1: Pt will tolerate dysphagia I diet with nectar thick liquids (no straw) without s/s aspiration to ensure safe nutrition/hydration measures. GOAL NOT MET - CONTINUE  INTERVENTIONS: Pt with PO trials of puree (applesauce) x2 and nectar thick liquids by cup x4. Pt with increased oral holding of bolus on ALL PO trials; limited oral manipulation despite MAX verbal and tactile cues. Provided additional time, pt with eventual A-P transfer of bolus. Delayed swallow trigger and suspected decreased hyolaryngeal elevation on all PO trials. ST providing total assistance for 1/2 trials of puree; pt independently taking 1/2 trials of puree with appropriate bite size. Delayed cough x2 following pt independent PO trial of puree texture. ST with controlled cup presentations of nectar thick liquid in 3/4 trials, no overt s/s of aspiration.  Pt wife reporting, \"He's been doing that himself\" immediate wet vocal quality and throat clear. Patient with STRONG cough, able to clear vocal quality. Recommend dysphagia I with nectar thick liquids via [id number] [address] with plan to complete MBS 3/28 mid-morning possibly with wife present. Patient completed PO trial of whole pills individual with puree administered by BETO Burton. Patient demonstrated with fair oral control; mildly delayed swallow. Following approximately 3rd pill; patient with wet vocal quality/grugle to follow. Provided nectar thick liquids via spoon patient able to clear vocal quality. Patient completed additional PO medication x2, no s/s of aspiration. Recommend continuation of PO medication whole in puree, individual with liquid wash of nectar thick liquids via spoon presentation. Patient would benefit from CLOSE pulmonary monitoring; with plan to complete MBS 3/28 mid-morning. SHORT TERM GOAL #2:  Goal 2: Pt will follow basic 1-step commands and answer yes/no questions with 50% accuracy given max cues to improve ability to convey wants/needs. GOAL NOT MET - CONTINUE  INTERVENTIONS: 1-step verbal commands involving body part manipulation: 0/6 independent, 3/5 MAX cues + ST model, 2/5 [place], 1/5 unable to elicit     SHORT TERM GOAL #3:  Goal 3: Pt will complete automatic speech, repetition, and confrontational naming tasks with 50% accuracy given max cues to improve verbal expression.  GOAL NOT MET - CONTINUE  INTERVENTIONS: Automatic speech:  2-66: unable to elicit despite MAX verbal and tactile cues + ST model provided x1  1-5: unable to elicit despite MAX verbal and tactile cues + ST model provided x2  \"Twinkle-Twinkle\": unable to elicit despite MAX verbal and tactile cues + ST model provided x2  \"Happy Birthday\": x3 trials completed with good success, pt verbalizing all words in song with fair intelligibility, good intonation of song - 1/3 trials independent, 1/3 trials with min cues for initiation, 1/3 trials in unison Patient remembers < 25% of the time    ASSESSMENT:  Assessment: [x]Progressing towards goals          []Not Progressing towards goals  SUMMARY: Pt has met 0/5 STGs and 0/2 LTGs this period; limited goal attainment secondary to recent pt admission to [facility]. Pt presents with at least moderate-severe oropharyngeal dysphagia. Oral phase significantly prolonged with soft textures with minimal attempts at mastication; immature chewing pattern via munching, impacting effective bolus breakdown and control, resulting in immediate cough with subsequent throat clears to follow swallow trigger. Puree texture resulting in improved oral phase, however, slight bolus holding with MAX verbal and tactile cues needed to elicit swallow initiation. Pt with impulsive large PO intake of nectar thick liquids via cup with production of spontaneous, multiple swallows correlating to some degree of pharyngeal residue. Improved success with PO intake of nectar thick liquid via controlled spoon presentations, though cannot rule out penetration and/or aspiration. ST attempted completion of instrumental assessment on 3/27 with poor success; pt inappropriate for assessment secondary to decreased level of alertness and limited command following. Will complete instrumental assessment 3/28 or as pt is appropriate to determine least restrictive PO diet. At this time, recommend CONSERVATIVE diet establishment of dysphagia I with nectar thick liquids via spoon presentations to meet nutrition and hydration needs safely. Additionally, pt presents with severe-profound expressive and receptive language deficits. Unable to establish consistent yes/no response format to convey BASIC wants/needs within relevant context given deferral to verbal responses of \"ok\" and \"alright\". Pt with intermittent execution of 1-step commands provided visual model and provision of max cues.  Confrontational naming of core objects diminished with pt unable to identify objects for functional usage (e.g., pen, comb). Pt with improved verbal expression via automatic speech melodies [facility] Shiela Solis), demonstrating fair intelligibility in known contexts with proper intonation throughout expression of winnie. Cognitive measures unable to be assessed at this time; O-log initiated with current score of 0/30, compounded due to current severity of deficits exhibited. Suspect some degree of dysarthria; will need to monitor within subsequent therapy sessions to ascertain need for further goal development given minimal verbal output to date. HIGHLY recommend intensive ST services to address linguistic domains to improve effective communication within current and future settings for wants/needs. Patient Tolerance of Treatment:   []Tolerated well [x]Tolerated fair []Required rest breaks []Fatigued  Plan for Next Session: automatic speech tasks; object ID  Education:  Learner:  [x]Patient          [x]Significant Other-Sayra          []Other  Education provided regarding:  [x]Goals and POC   [x]Diet and swallowing precautions    []Home Exercise Program  [x]Progress and/or discharge information  Method of Education:  [x]Discussion          [x]Demonstration          []Handout          []Other  Evaluation of Education:   [x]Verbalized understanding   []Demonstrates without assistance  []Demonstrates with assistance  [x]Needs further instruction     [x]No evidence of learning-patient                   []No family present      Plan: [x]Continue with current plan of care    []Modify current plan of care as follows:    []Discharge patient    Discharge Location:    Services/Supervision Recommended:     [x]Patient continues to require treatment by a licensed therapist to address functional deficits as outlined in the established plan of care. JOHNNIE Nina. - Student Intern   Miranda Latham.  [address] [place] [address], [address]

## 2019-03-27 NOTE — PROGRESS NOTES
[address] [facility] THERAPY  [address]   SLP Individual Minutes  Time In: 5298  Time Out: 09  Minutes: 15  Timed Code Treatment Minutes: 0 Minutes   *Make up minutes 15 minutes    [x]Daily Note  []Progress Note  []Discharge Note    Date: 3/27/2019  Patient Name: Isabella Enriquez        MRN: [id number]    : 1931  (80 y.o.)  Gender: male   Primary Provider: Viktoria Harkins MD  Admitting Diagnosis:  Subdural hematoma due to concussion   Secondary Diagnosis: Dysphagia, Cognitive/linguistic deficits   Precautions: Fall Risk, Aspiration   Swallowing Status/Diet: Dysphagia I with nectar thick liquids   Swallowing Strategies: Direct 1:1 feeding assistance, PO intake ONLY when alert   DATE of last MBS:  n/a  Pain:  Did not state    Subjective: Patient with scheduled MBS this AM at 9:00; upon arrival to radiology patient NOT appropriate to complete MBS. Will hold this date and plan to complete 3/28. See MT note for details. Upon return to room from radiology patient's wife in room; patient alert and requesting breakfast.  Breakfast tray in room and BETO Burton in room. ST to complete dysphagia treatment to determine safety of PO intake this date. BETO Burton present to administer PO medication. SHORT TERM GOAL #1:  Goal 1: Pt will tolerate dysphagia I diet with nectar thick liquids (no straw) without s/s aspiration to ensure safe nutrition/hydration measures. INTERVENTIONS: Direct meal intervention/diet analaysis completed with breakfast tray of pureed consistencies (applesauce, cream of wheat) and nectar thick liquids. ST assisted patient with upright position in bed; ST attempted to obtain 90 degree hip flection as able at bedside. ST completed all direct 1:1 feeding with controlled environment to target dysphagia to achieve optimal performance.   Patient completed PO trial of applesauce via spoon x5, patient with limited oral mastication; however, following additional time patient with delayed swallow reflex, 2/5 trials-no s/s of aspiration, 3/5 trials-throat clear with wet vocal quality provided direct verbal cues patient able to clear vocal quality. Patient completed PO trials of nectar thick liquids via spoon and direct 1:1 commands to \"swallow quick\"; no s/s of aspiration with timely swallow. Patient's wife reporting, \"He has been feeding him self. \"  Patient completed PO trial of nectar thick liquids via cup; demonstrating with LARGE impulsive cup drink, immediate wet vocal quality and throat clear. Patient with STRONG cough, able to clear vocal quality. Recommend dysphagia I with nectar thick liquids via [id number] [address] with plan to complete MBS 3/28 mid-morning possibly with wife present. Patient completed PO trial of whole pills individual with puree administered by BETO Burton. Patient demonstrated with fair oral control; mildly delayed swallow. Following approximately 3rd pill; patient with wet vocal quality/grugle to follow. Provided nectar thick liquids via spoon patient able to clear vocal quality. Patient completed additional PO medication x2, no s/s of aspiration. Recommend continuation of PO medication whole in puree, individual with liquid wash of nectar thick liquids via spoon presentation. Patient would benefit from CLOSE pulmonary monitoring; with plan to complete MBS 3/28 mid-morning. SHORT TERM GOAL #2:  Goal 2: Pt will follow basic 1-step commands and answer yes/no questions with 50% accuracy given max cues to improve ability to convey wants/needs. INTERVENTIONS: Patient only able to follow very basic 1 step commands within controlled environment. SHORT TERM GOAL #3:  Goal 3: Pt will complete automatic speech, repetition, and confrontational naming tasks with 50% accuracy given max cues to improve verbal expression.    INTERVENTIONS: DNT secondary to focus on dysphagia treatment    SHORT TERM GOAL #4:  Goal 4: Pt will ID objects/pictures in F02 with 50% accuracy given max cues to permit potential introduction of alternative communication needs. INTERVENTIONS:DNT secondary to focus on dysphagia treatment    SHORT TERM GOAL #5:  Goal 5: Pt will complete BASIC orientation tasks with 50% accuracy given max cues to improve awareness to immediate surroundings. INTERVENTIONS: DNT secondary to focus on dysphagia treatment    COMMUNICATION  Comprehension: 1 - Patient understands basic needs <25% of the time  Expression: 1 - Expresses basic ideas/needs < 25% of the time  SOCIAL COGNITION  Social Interaction: 1 - Patient appropriate < 25% of the time  Problem Solvin - Patient solves simple/routine tasks < 25%  Memory: 1 - Patient remembers < 25% of the time    ASSESSMENT:  Assessment: [x]Progressing towards goals          []Not Progressing towards goals       Patient Tolerance of Treatment:   []Tolerated well [x]Tolerated fair [x]Required rest breaks [x]Fatigued  Plan for Next Session: Cognitive treatment  Education:  Learner:  [x]Patient          [x]Significant Other-Sayra          []Other  Education provided regarding:  [x]Goals and POC   [x]Diet and swallowing precautions    []Home Exercise Program  [x]Progress and/or discharge information  Method of Education:  [x]Discussion          [x]Demonstration          []Handout          []Other  Evaluation of Education:   [x]Verbalized understanding   []Demonstrates without assistance  []Demonstrates with assistance  [x]Needs further instruction     [x]No evidence of learning-patient                   []No family present      Plan: [x]Continue with current plan of care    []Modify current plan of care as follows:    []Discharge patient    Discharge Location:    Services/Supervision Recommended:     [x]Patient continues to require treatment by a licensed therapist to address functional deficits as outlined in the established plan of care.     Sheyla Tran M.S. Tammi Rodriguez [phone]

## 2019-03-27 NOTE — PROGRESS NOTES
[address] NOTE    Conference Date: 3/28/2019  Admit Date:  3/25/2019  6:08 PM  Patient Name: Shelbie Barcenas    MRN: [id number]    : 1931  (80 y.o.)  Rehabilitation Admitting Diagnosis:  Subdural hematoma due to concussion, without loss of consciousness, sequela ([facility] [address] [S06.5X0S]  Referring Practitioner: Yanely Redman MD    CASE MANAGEMENT  Current issues/needs regarding patient and family discharge status: SW met with patient and spouse, Benedict Oneal, to introduce self and explain role, complete SW assessment, and initiate discharge planning. Patient did not sleep well last night, therefore, spouse, Benedict Oneal, requested patient rest during assessment. Prior to injury and hospitalization, patient reports independence with ADL's and personal care. Patient lives with spouse, Benedict Oneal. Patient's spouse, Benedict Oneal, was scheduled to have outpatient back surgery on 2019, but has had to re-schedule due to patient's hospitalization. Tentative date for surgery is  or . Patient and spouse, Benedict Oneal, has also been planning to move to [facility] in . Due to hospitalization, patient's spouse, Benedict Oneal, unsure of plans. Patient was not using any medical equipment for ambulation in home environment, but would use cane for community mobility. Patient was driving and assisting spouse, Benedict Oneal, with errands. Spouse, Benedict Oneal, does not drive due to back issues. Spouse, Benedict Oneal, was completing laundry, meal preparation, managing finances, and managing patient's medications. Patient and spouse, Benedict Oneal, hire private . Patient's spouse, Benedict Oneal, will not be able to provide any physical assistance upon discharge due to back issues. Patient has three children, none of which would be available to provide any additional support.  Patient's spouse, Benedict Oneal, also has three children, but none of which would be able to provide any support. Patient's spouse, Particpamela Diaz, very anxious and tearful regarding situation and patient's prognosis. Anticipate patient would benefit from home health care services and medical equipment upon discharge. SW reviewed with patient and spouse, Particpamela Diaz, team conference on Thursday, 03/28/2019, to further discuss progress and discharge recommendations. SW to follow and maintain involvement in discharge planning. PHYSICAL THERAPY  Patient did not meet any goals thus far. Patient inconsistent with the amount of assist required. At times can be CGA for transfers, other times needs Mod A. Pt with decreased initiation, motor planning, and difficulty at times following simple one step commands. Pt also very fatigued needing increased time to complete activities. Patient with right sided lean with gait with and without RW, which was no so evident on evaluation. Pt will continue to benefit from skilled therapy to address functional mobility limitations for safe home going. Equipment Needed: Yes(Continue to assess (possbile RW))    SPEECH THERAPY  Pt has met 0/5 STGs and 0/2 LTGs this period; limited goal attainment secondary to recent pt admission to [facility]. Pt presents with at least moderate-severe oropharyngeal dysphagia. Oral phase significantly prolonged with soft textures with minimal attempts at mastication; immature chewing pattern via munching, impacting effective bolus breakdown and control, resulting in immediate cough with subsequent throat clears to follow swallow trigger. Puree texture resulting in improved oral phase, however, slight bolus holding with MAX verbal and tactile cues needed to elicit swallow initiation. Pt with impulsive large PO intake of nectar thick liquids via cup with production of spontaneous, multiple swallows correlating to some degree of pharyngeal residue.  Improved success with PO intake of nectar thick liquid via controlled spoon presentations, though cannot rule out penetration and/or aspiration. ST attempted completion of instrumental assessment on 3/27 with poor success; pt inappropriate for assessment secondary to decreased level of alertness and limited command following. Will complete instrumental assessment 3/28 or as pt is appropriate to determine least restrictive PO diet. At this time, recommend CONSERVATIVE diet establishment of dysphagia I with nectar thick liquids via spoon presentations to meet nutrition and hydration needs safely. Additionally, pt presents with severe-profound expressive and receptive language deficits. Unable to establish consistent yes/no response format to convey BASIC wants/needs within relevant context given deferral to verbal responses of \"ok\" and \"alright\". Pt with intermittent execution of 1-step commands provided visual model and provision of max cues. Confrontational naming of core objects diminished with pt unable to identify objects for functional usage (e.g., pen, comb). Pt with improved verbal expression via automatic speech melodies [facility]), demonstrating fair intelligibility in known contexts with proper intonation throughout expression of winnie. Cognitive measures unable to be assessed at this time; O-log initiated with current score of 0/30, compounded due to current severity of deficits exhibited. Suspect some degree of dysarthria; will need to monitor within subsequent therapy sessions to ascertain need for further goal development given minimal verbal output to date. HIGHLY recommend intensive ST services to address linguistic domains to improve effective communication within current and future settings for wants/needs. OCCUPATIONAL THERAPY  Pt has not met any goals due to only being seen for an evaluaiton and 1 treatment on the inpt rehab unit. Pt demo deficits in right UE and need for increased assistance during ADL task.  He currntly needs max cueing for initiation and problem solving as well as processing speed for basic care tasks. . Pt demo decreased standing balance during ADL tasks. Pt would benefit from skilled OT Services to increase his endurance for ADL tasks and mobility to eventual return home. Other: Pt has a shower chair, grab bars in BR and shower. RECREATIONAL THERAPY  Patient has been offered participation in recreational therapy activities and participates as able. pt unavailable for RT evaluation -will attempt again tomorrow     NUTRITION  Weight: 123 lb 1.6 oz (55.8 kg) / Body mass index is 17.66 kg/m². Current diet: Dietary Nutrition Supplements: Frozen Oral Supplement  DIET DYSPHAGIA I PUREED; Nectar Thick; No Drinking Straw  Please see nutrition note for details.     NURSING  Continent of Bowel and Bladder: No  Pain is Managed:  Yes  Signs and Symptoms of Infection:  No  Signs and Symptoms of Skin Breakdown:  No  Injury and Fall Free during Inpatient Rehabilitation Admission: Yes    Consultations/Labs/X-rays: Neurosurgery, Trauma Surgery, Critical Care, Family Medicine, Physical Medicine      Family Education: Need to make contact with family to initiate education  Fall Risk:  Falling star program initiated  Is the patient appropriate for a stay in the functional apartment? no    Discharge Plan   Estimated Discharge Date: 4/15/2019   Destination: undetermined at this time  Services at Discharge: undetermined at this time  Is patient appropriate for an outpatient driving evaluation? no  Equipment at Discharge: undetermined at this time  Factors facilitating achievement of predicted outcomes: Family support, Motivated, Cooperative, Pleasant and Has needed Durable Medical Equipment at home  Barriers to the achievement of predicted outcomes: Confusion, Cognitive deficit, Limited insight into deficits, Decreased endurance, Upper extremity weakness, Lower extremity weakness, Impaired vision and Incontinence of bladder    Team Members Present at Conference:  Case Manager:Farzaneh Rodrigez [place]  Occupational Therapist: Berlin Ibrahim OTR/L [id number]. Physical Therapist: Vincent Jameson PT [id number]  Speech Therapist: Jung Whitehead, [address], [address] Nurse: Becca Serrano, RN   Psychologist: Balwinder Tavarez, PhD.    I approve the established interdisciplinary plan of care as documented within the medical record of [facility].     Malachi Koroma

## 2019-03-27 NOTE — PLAN OF CARE
Problem: DISCHARGE BARRIERS  Goal: Patient's continuum of care needs are met  Note:   [address] [address]  Physical Medicine Case Management Assessment    X  Inpatient Rehabilitation Unit  ? Transitional Care Unit    Patient Name: Rodrigue Cobb        MRN: [id number]    : 1931  (80 y.o.)  Gender: male     Date of Admission: 2019      Family/Social/Home Environment: Prior to injury and hospitalization, patient reports independence with ADL's and personal care. Patient lives with spouse, Alexis Ramirez. Patient's spouse, Alexis Ramirez, was scheduled to have outpatient back surgery on 2019, but has had to re-schedule due to patient's hospitalization. Tentative date for surgery is  or . Patient and spouse, Alexis Ramirez, has also been planning to move to [place] in . Due to hospitalization, patient's spouse, Alexis Ramirez, unsure of plans. Patient was not using any medical equipment for ambulation in home environment, but would use cane for community mobility. Patient was driving and assisting spouse, Alexis Ramirez, with errands. Spouse, Alexis Ramirez, does not drive due to back issues. Spouse, Alexis Ramirez, was completing laundry, meal preparation, managing finances, and managing patient's medications. Patient and spouse, Alexis Ramirez, hire private . Patient's spouse, Alexis Ramirez, will not be able to provide any physical assistance upon discharge due to back issues. Patient has three children, none of which would be available to provide any additional support. Patient's spouse, Alexis Ramirez, also has three children, but none of which would be able to provide any support. Patient's spouse, Alexis Ramirez, very anxious and tearful regarding situation and patient's prognosis.       Social/Functional History  Lives With: Spouse  Type of Home: House  Home Layout: One level  Home Access: Stairs to enter with rails  Entrance Stairs - Number of Steps: 3  Entrance Stairs - also been planning to move to [address] [place] [address] in June. Due to hospitalization, patient's spouse, Ana Lilia Hill, unsure of plans. Patient was not using any medical equipment for ambulation in home environment, but would use cane for community mobility. Patient was driving and assisting spouse, Ana Lilia Hill, with errands. Spouse, Ana Lilia Hill, does not drive due to back issues. Spouse, Ana Lilia Hill, was completing laundry, meal preparation, managing finances, and managing patient's medications. Patient and spouse, Ana Lilia Hill, hire private . Patient's spouse, Ana Lilia Hill, will not be able to provide any physical assistance upon discharge due to back issues. Patient has three children, none of which would be available to provide any additional support. Patient's spouse, Ana Lilia Hill, also has three children, but none of which would be able to provide any support. Patient's spouse, Ana Lilia Hill, very anxious and tearful regarding situation and patient's prognosis. Anticipate patient would benefit from home health care services and medical equipment upon discharge. SW reviewed with patient and spouse, Ana Lilia Hill, team conference on Thursday, 03/28/2019, to further discuss progress and discharge recommendations. SW to follow and maintain involvement in discharge planning.            Discharge Planning  Living Arrangements: Spouse/Significant Other  Support Systems: Spouse/Significant Other, Family Members  Potential Assistance Needed: [facility]  Potential Assistance Purchasing Medications: No  Meds-to-Beds: Does the patient want to have any new prescriptions delivered to bedside prior to discharge?: No  Type of Home Care Services: None  Patient expects to be discharged to[de-identified] home  Expected Discharge Date: (Undetermined)  Follow Up Appointment: Best Day/Time : Monday PM    Electronically signed by FRANK Malin on 3/27/2019 at 9:45 AM

## 2019-03-27 NOTE — PLAN OF CARE
Care plan reviewed with patient. Patient verbalize understanding of the plan of care and contribute to goal setting. Problem: Falls - Risk of:  Goal: Will remain free from falls  Description  Will remain free from falls  Outcome: Ongoing  Note:   Fall protocol initiated. Live sitter with patient at all times. Alarms in place. Problem: Risk for Impaired Skin Integrity  Goal: Tissue integrity - skin and mucous membranes  Description  Structural intactness and normal physiological function of skin and  mucous membranes. Outcome: Ongoing  Note:   Patient is very restless and moves all around throughout the shift. Patient also working with therapy and is transferred to chair and bed throughout shift. Problem: HEMODYNAMIC STATUS  Goal: Patient has stable vital signs and fluid balance  Outcome: Ongoing  Note:   See doc flow sheet     Problem: Falls - Risk of:  Goal: Absence of physical injury  Description  Absence of physical injury  Outcome: Ongoing  Note:   Live sitter with patient at all times. Patient is to wear a helmet when out of bed. Problem: Pain:  Goal: Pain level will decrease  Description  Pain level will decrease  Outcome: Ongoing  Note:   Patient denies pain when asked. Problem: Infection - Surgical Site:  Goal: Will show no infection signs and symptoms  Description  Will show no infection signs and symptoms  Outcome: Ongoing  Note:   Patient has no sign or symptom of infection noted      Problem: IP BOWEL ELIMINATION  Goal: LTG - patient will utilize adaptive techniques/equipment to complete bowel elimination  Outcome: Not Met This Shift  Note:   Patient has been incontinent of bowel and does not communicate to staff when he needs to go. Patient is currently on an every two hour time void.       Problem: IP BOWEL ELIMINATION  Goal: LTG - patient will have regular and routine bowel evacuation  Outcome: Not Met This Shift  Note:   Patient has been incontinent of bowel and does not communicate to staff when he needs to go. Patient is currently on an every two hour time void. Problem: IP BOWEL ELIMINATION  Goal: STG - patient will be accident free  Outcome: Not Met This Shift  Note:   Patient has been incontinent of bowel and does not communicate to staff when he needs to go. Patient is currently on an every two hour time void. Problem: IP BLADDER/VOIDING  Goal: LTG - Patient will utilize adaptive techniques/equipment to complete bladder elimination  Outcome: Not Met This Shift  Note:   Patient has been incontinent of bowel and does not communicate to staff when he needs to go. Patient is currently on an every two hour time void.

## 2019-03-27 NOTE — PROGRESS NOTES
Physical Medicine & Rehabilitation  Progress Note    Chief Complaint:  LEFT frontal subdural hemorrhage s/p craniectomy/evacuation    Subjective:  Patient slept about three hours last night. He was too tired to do MBS. Spouse agreeable to trial Trazodone this evenng. She was also concerned about the depth of the surgical site changing throughout the day and also if he could sleep on his LEFT side. Per Dr. Lb Rios the changes are normal and sleeping with a soft pillow on the LEFT side is permissible. No other specific concerns. Rehabilitation:  Physical therapy: FIMS:  Bed Mobility:      Transfers: Sit to Stand: Moderate Assistance, 2 Person Assistance(Mod A x1 and CGA x1, from WC and commode, Max Vcs for set up, hand placement, safety, poor carry over. Mod lean to R noted as pt became more fatigued)  Stand to sit: Moderate Assistance, 2 Person Assistance, Minimal Assistance(Mod A x1 and Min A x1, Max Vcs and tactile cueing for safety, hand placement and to initiate movement of sitting down, to WC and commode), Ambulation 1  Surface: level tile  Device: Rolling Walker  Other Apparatus: O2, Wheelchair follow(3L)  Assistance: Minimal assistance, Contact guard assistance, 2 Person assistance  Quality of Gait: Forward flexed posture, decreased TKE, heavy reliance on AD, slight to mod lean to R, quick pace, unequal step lengths, mod foot clearance MELCHOR. Distance: 60ft x1, 50ft x1  Comments: increased lean to R noted during second bout. Pt requiring min A x1 at trunk d/t leaning too far forward and quick pace, Min A to CGA x1,      FIMS: Bed, Chair, Wheel Chair: 2 - Requires 50-74% assistance to transfer  Walk: 1 - Total Assistance Walks < 50 feet OR requires two or more people OR patient performs < 25% of locomotion effort  Distance Walked: 45 feet x 2 , PT Equipment Recommendations  Equipment Needed: Yes(Continue to assess (erlinda CAMPBELL)), Assessment: Pt tolerated session fairly well.  continued with difficulty in following instructions for safety, tfers, gait, and ther ex. Pt cont to be limited with responding to and completing tasks. Increased time taken with bathroom activities; commode set up, transferring to/from and use of commode, d/t high level of assist required. Pt did tolerate amb this date, however demo'd lateral lean and slightly unsteady gait. Pt would benefit from cont skilled PT services to increase functional mobility. Occupational therapy: FIMS:  Eatin - Staff performs feeding for patient or patient requires IV fluids for hydration/TPN/PPN  Groomin - Requires assistance with all tasks(max A for removing dentures, rinsing them and soaking them. mod A hand over hand to swab mouth to rinse out mouth. Mod A for reapplying dentures. min A for shaving seated in the w.c )  Bathin - Able to bathe 2 or less areas/total assist(max A for hand over hand and initiation to wash each body part. Assit to wash B underarms, B LEs and bottom. second person needed for standing to wash bottom. max cues for continuation of task. mod increased time needed for processing speed throughout. )  Dressing-Upper: 2 - Requires assist with 3 tasks(much increased time needed for clothing orientation and sustained attention to don a shirt sitting EOB. CGA to min A sitting balance with pt occasionally leaning posteriorly .)  Dressing-Lower: 2 - Requires assist with 4-5 parts of dressing(max A for pants over R LE, min A and increased time to don L LE into pants, min A standing balance with mod A for pulling pants up. assist for socks and shoes. )  Toiletin - Total assist(mod A for clothing mgt up and down. mod A for wiping.)  Toilet Transfer: 2 - Requires 50-74% assist getting off toilet(min A to lift and lower to a std height toilet. mod verbal and tacile cueing for hand placement. ),  , Assessment: Pt has not met any goals due to only being seen for an evaluaiton and 1 treatment on the inpt rehab unit.   Pt demo deficits despite implementation of visual model and provision of max cues. Confrontational naming of core objects diminished with pt unable to identify objects for functional usage (e.g., pen, comb). Cognitive measures unable to be assessed at this time; O-log initiated with current score of 0/30, compounded due to current severity of deficits exhibited. Suspect some degree of dysarthria; will need to monitor within subsequent therapy sessions to ascertain need for further goal development given minimal verbal output at date.  HIGHLY recommend intensive ST services to address linguistic domains to improve effective communication within current and future settings for wants/needs.     Review of Systems:  CONSTITUTIONAL:  positive for fatigue  EYES:  positive for  glasses  HEENT:  Positive for hearing loss  RESPIRATORY:  positive for NC  CARDIOVASCULAR:  negative  GASTROINTESTINAL:  Positive for incontinence  GENITOURINARY:  positive for urinary incontinence  SKIN:  Positive for bruising  HEMATOLOGIC/LYMPHATIC:  negative  MUSCULOSKELETAL:  positive for  muscle weakness  NEUROLOGICAL:  positive for memory problems, gait problems, dysphagia and weakness  BEHAVIOR/PSYCH:  negative  10 point system review otherwise negative    Objective:  BP (!) 170/72   Pulse 72   Temp 98.1 °F (36.7 °C)   Resp 16   Ht 5' 10\" (1.778 m)   Wt 123 lb 1.6 oz (55.8 kg)   SpO2 94%   BMI 17.66 kg/m²     Awake  Orientation:   Person, unable to establish location  Mood: dysthymic  Affect: flat  General appearance:  in no acute distress, glasses and [email or web address], hearing aids, lying in bed     Memory:   abnormal - unable to remember his fall  Attention/Concentration: abnormal - slowed responses; good one step command following  Language:  abnormal - expressive aphasia and dysarthria     Cranial Nerves:  cranial nerves II-XII are grossly intact  With exception of decreased hearing acuity  ROM:  normal  Motor Exam:  Motor exam is 4 out of 5 all extremities with the exception of LEFT foot drop 2/5     Coordination:   Normal for FTN and JES  Deep Tendon Reflexes:  Reflexes are intact and symmetrical bilaterally     Skin: warm and dry, no rash or erythema and ecchymoses noted on LEFT forehead, orbit and brow, sunken skull defect consistent with surgery  Peripheral vascular: Pulses: Normal upper and lower extremity pulses; Edema: trace    Diagnostics:   Recent Results (from the past 24 hour(s))   Potassium    Collection Time: 03/27/19  6:04 AM   Result Value Ref Range    Potassium 3.5 3.5 - 5.2 meq/L     Impression:  1. 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 2. LEFT subdural hemorrhage with marked mass effect and 14 mm of midline shift toward the right s/p LEFT  frontotemporoparietal craniectomy with evacuation of LEFT pan hemispheric acute subdural hematoma by Dr. Lay Frederick on 3/17/2019.  3. Severe-profound expressive and receptive language deficits. 4. Moderate-severe oropharyngeal dysphagia  5. Gait instability. 6. Parkinson Disease dignosed in 2016.  7. Chronic LEFT foot drop. 8. Hypoxia. 9. Post operative anemia with Hgb of 13.3 on 3/17 with decrease to 10.2 on 3/18 s/p two units of platelets. 10. History of prior subarachnoid hemhorrage in 2015 s/p fall with LOC. 11. CAD. 12. HTN. 13. HLD. 14. BPH. 15. History of bladder neck cancer s/p surgical resection 8/3/2018 and 2011. 12. History of melanoma s/p resection involving face and scalp. 17. History of Cervical fusion in 2009. 18. Macular degeneration. 19. Presbycusis with use of hearing aids. 20. Sleep apnea on CPAP. 21. Hypovitaminosis D.  22. Circadian rhythm disturbance.     Plan:   Medical management: Per Dr. Rupa Jefferson.     Consultants:  Neurosurgery, Trauma Surgery, Critical Care, Family Medicine, Physical Medicine     Narcotic usage:  N/A  Last BM:  Stool Amount: Smear (03/27/19 7596)  Ambulation/Mobility:  Quality of Gait: Forward flexed posture, decreased TKE, heavy reliance on AD, slight to mod lean to R,

## 2019-03-27 NOTE — PROGRESS NOTES
Physical Therapy   [address] [address]  INPATIENT PHYSICAL THERAPY  DAILY NOTE  [facility] - [id number]    Time In: 3262  Time Out: 1130  Timed Code Treatment Minutes: 25 Minutes  Minutes: 25     Minute Variance  Variance: -5  Reason: (OT)    Date: 3/27/2019  Patient Name: Vin Ortega,  Gender:  male        MRN: [id number]  : 1931  (80 y.o.)  Referral Date : 19  Referring Practitioner: Alia Hurst MD  Diagnosis: Subdural Hematoma due to concussion without loss of conciousness   Treatment Diagnosis: decreased balance, decreased strength, decreased endurance, unsteady gait   Additional Pertinent Hx: He was admitted 3/17/2019 as a level III trauma after a fall at home where he struck the LEFT side of his head on a nightstand. Per report the patient had switched sides of the bed with his wife; she was scheduled to have lumbar back surgery soon and sleeping on his side of the bed was easier for her. His neurological examination was normal with a GCS of 15 and his CT of the head did show a subdural hematoma over the LEFT hemisphere with a 5 mm LEFT to RIGHT shift. Neurosurgery was consulted and requested a 6 hour follow-up image which showed a significant increase in the size of the hematoma with marked mass effect and a 14 mm midline shift. The patient was taken emergently to the operating room by Dr. Lay Frederick for a craniectomy with evacuation of the LEFT subdural hematoma blood products on 3/17/2019. Since this procedure the patient has been somewhat more confused, has some noted hypoxia with a 3 L oxygen requirement via nasal cannula, a postoperative blood loss anemia with hemoglobin decreased 3 units and requiring 2 units of platelets to be transfused. The patient was on a daily inpatient rehabilitation unit in  after sustaining a subarachnoid hemorrhage after a fall with a brief loss of consciousness.   Since that admission he has been diagnosed with Parkinson disease in 2016 and is followed by Dr. Beth Valladares.     Past Medical History:   Diagnosis Date    Ankle fracture 2/2016    left in cast    Arthritis     BPH (benign prostatic hyperplasia)     CAD (coronary artery disease)     margarette    Cancer of bladder, neck ([address]     Cervical vertebra fusion     Colon polyps 2010    [facility]rahan    Foot drop, left 9/8/2015    GERD (gastroesophageal reflux disease)     Hyperlipemia     Macular degeneration     Melanoma ([address]     Neck, Scalp    RISHABH on CPAP     Parkinson disease ([address] 2016    Presbycusis of both ears, uses hearing aids     Spinal stenosis     Subdural hematoma ([address] 2015       resolved     Syncope     Traumatic subdural hemorrhage without loss of consciousness ([address] 3/17/2019     Past Surgical History:   Procedure Laterality Date    BACK SURGERY      BLADDER SURGERY  5/31/11    BLADDER TUMOR EXCISION  2011    BLEPHAROPLASTY  2007, 2008    CARDIAC CATHETERIZATION  2009    moderate    margarette    CARDIAC CATHETERIZATION  11/2018    CATARACT REMOVAL Bilateral 1995    CERVICAL FUSION  2009    COLONOSCOPY  9-24-10      [facility]  wnl    CORONARY ANGIOPLASTY WITH STENT PLACEMENT  11/2018     [facility] drug eluting stent dr Shyanne Olguin Left 3/17/2019    LEFT  FRONTOTEMPOROPARIETAL CRANIECTOMY WITH EVACUATION OF LEFT PANHEMISPHERIC ACUTE SUBDURAL HEMATOMA performed by [facility]lee Dial [facility] at [facility] 9  .6/03/2015    CYSTOSCOPY  08/2018    bladder tumor    [facility]    DIAGNOSTIC CARDIAC CATH LAB PROCEDURE      EYE SURGERY      HEMORRHOID SURGERY  1994    AL CYSTOURETHROSCOPY,FULGUR .5-2CM LESN N/A 8/3/2018    CYSTOSCOPY TRANSURETHRAL RESECTION BLADDER TUMOR, SMALL performed by Jhon Batres MD at [address]      left rivera - Dr Tamela Dasilva      neck and scalp melanoma, cheek basal cell    SKIN CANCER EXCISION      left rivera - Dr Indy Barcenas following verbal instructions and demonstrations; marching, LAQs, hip abd/add. Easily fatigued and required freq rest breaks. All to increase strength and endurance for improved functional mobility. Activity Tolerance:  Activity Tolerance: Patient limited by fatigue;Patient limited by endurance; Patient limited by cognitive status    Assessment: Body structures, Functions, Activity limitations: Decreased functional mobility , Decreased safe awareness, Decreased balance, Decreased coordination, Decreased ADL status, Decreased cognition, Decreased ROM, Decreased endurance, Decreased strength  Assessment: Pt tolerated session fair. Increased difficulty following instructions for safety, tfers and ther ex. Pt cont to be limited with responding to and completing tasks. Pt would benefit from cont skilled PT services to increase functional mobility. Prognosis: Fair, Good          Discharge Recommendations:  Discharge Recommendations: Continue to assess pending progress    Patient Education:  Patient Education: tfers, standing balance, posture, ther ex  Barriers to Learning: cognition    Equipment Recommendations:  Equipment Needed: Yes(Continue to assess (erlinda CAMPBELL))    Safety:  Type of devices:  All fall risk precautions in place, Gait belt, Call light within reach, Sitter present, Chair alarm in place(pt left in [facility] with wife and ST)  Restraints  Initially in place: No    Plan:  Times per week: 5x/ week 90 minutes; 1x/week 30 minutes  Times per day: Daily  Current Treatment Recommendations: Strengthening, Transfer Training, Endurance Training, Neuromuscular Re-education, Patient/Caregiver Education & Training, ROM, ADL/Self-care Training, Balance Training, Gait Training, Home Exercise Program, Functional Mobility Training, Stair training, Safety Education & Training, Equipment Evaluation, Education, & procurement    Goals:       Short term goals  Time Frame for Short term goals: 1 week   Short term goal 1: Pt to

## 2019-03-27 NOTE — PROGRESS NOTES
Shiela Littlejohn is a 80 y.o. male patient.     Current Facility-Administered Medications   Medication Dose Route Frequency Provider Last Rate Last Dose    potassium replacement protocol   Other RX Placeholder Rivera Troy MD        hydrocortisone 1 % cream   Topical BID Rivera Troy MD        vitamin D (CHOLECALCIFEROL) tablet 1,000 Units  1,000 Units Oral Daily Molly Alfaro MD        finasteride (PROSCAR) tablet 5 mg  5 mg Oral Daily Molly Alfaro MD   5 mg at 03/26/19 0755    acetaminophen (TYLENOL) tablet 650 mg  650 mg Oral Q4H PRN ELVIN JuanC   650 mg at 03/26/19 1948    bacitracin ophthalmic ointment   Topical TID Lucía Mclaughlin PA-C        famotidine (PEPCID) tablet 20 mg  20 mg Oral BID DANICA Juan-C   20 mg at 03/26/19 1948    magnesium hydroxide (MILK OF MAGNESIA) 400 MG/5ML suspension 30 mL  30 mL Oral Daily PRN Lucía Mclaughlin PA-C        sodium chloride flush 0.9 % injection 10 mL  10 mL Intravenous 2 times per day Lucía Mclaughlin PA-C   10 mL at 03/26/19 1027    sodium chloride flush 0.9 % injection 10 mL  10 mL Intravenous PRN Lucía Mclaughlin PA-C        amLODIPine (NORVASC) tablet 2.5 mg  2.5 mg Oral Daily Lucía Mclaughlin PA-C   2.5 mg at 03/26/19 0755    atorvastatin (LIPITOR) tablet 10 mg  10 mg Oral Nightly DANICA Juan-C   10 mg at 03/26/19 1947    levETIRAcetam (KEPPRA) tablet 500 mg  500 mg Oral BID DANICA Juan-C   500 mg at 03/26/19 1947    multivitamin 1 tablet  1 tablet Oral Daily DANICA Juan-C   1 tablet at 03/26/19 0755    tamsulosin (FLOMAX) capsule 0.4 mg  0.4 mg Oral Nightly DANICA Juan-C   0.4 mg at 03/26/19 1948    miconazole (MICOTIN) 2 % powder   Topical BID Molly Alfaor MD        docusate sodium (COLACE) capsule 100 mg  100 mg Oral BID PRN Molly Alfaro MD        ondansetron [facility]) tablet 4 mg  4 mg Oral Q8H PRN Molly Alfaro MD        [facility]) tablet 17.2 mg  2 tablet Oral Nightly PRN Mani Falcon MD        carbidopa-levodopa (SINEMET)  MG per tablet 1.5 tablet  1.5 tablet Oral 3 times per day Mani Falcon MD   1.5 tablet at 03/26/19 1739    And    rOPINIRole (REQUIP) tablet 0.5 mg  0.5 mg Oral 3 times per day Mani Falcon MD   0.5 mg at 03/26/19 1740     Allergies   Allergen Reactions    Sulfa Antibiotics      Unknown reaction from childhood     Active Problems:    Subdural hematoma due to concussion [facility])  Resolved Problems:    * No resolved hospital problems. *    Blood pressure 138/78, pulse 88, temperature 97.9 °F (36.6 °C), temperature source Oral, resp. rate 18, height 5' 10\" (1.778 m), weight 123 lb 1.6 oz (55.8 kg), SpO2 93 %. Subjective:  Symptoms:  Stable. Diet:  Adequate intake. Activity level: Impaired due to weakness. Pain:  He complains of pain that is mild. Objective:  General Appearance:  Comfortable. Vital signs: (most recent): Blood pressure 138/78, pulse 88, temperature 97.9 °F (36.6 °C), temperature source Oral, resp. rate 18, height 5' 10\" (1.778 m), weight 123 lb 1.6 oz (55.8 kg), SpO2 93 %. Output: Producing urine. HEENT: Normal HEENT exam.  (Scalp lesion  Noted and clean and  dry)    Lungs:  Normal effort and normal respiratory rate. Breath sounds clear to auscultation. Heart: Normal rate. Regular rhythm. S1 normal and S2 normal.  No murmur. Abdomen: Abdomen is soft. Bowel sounds are normal.   There is no abdominal tenderness. Extremities: Normal range of motion. Pulses: Distal pulses are intact. Neurological: Patient is alert. Patient has abnormal coordination. (Thought blocking  Noted  And poor  Judgement  And  Balance off). Skin:  Warm and dry. Assessment:    Condition: In stable condition. Unchanged.    (Left  Subdural evacuated and  With cognitive deficits of  Receptive an expressive and gait disturbance     htn stable     ashd  stable     TBI noted and no

## 2019-03-27 NOTE — PROGRESS NOTES
Physical Therapy   [address] [address]  INPATIENT PHYSICAL THERAPY  PROGRESS NOTE  [facility] - [id number]    Time In: 1330  Time Out: 1436  Timed Code Treatment Minutes: 66 Minutes  Minutes: 66     Date: 3/27/2019  Patient Name: Ana Gonzalez,  Gender:  male        MRN: [id number]  : 1931  (80 y.o.)  Referral Date : 19  Referring Practitioner: Dandy Castillo MD  Diagnosis: Subdural Hematoma due to concussion without loss of conciousness   Treatment Diagnosis: decreased balance, decreased strength, decreased endurance, unsteady gait   Additional Pertinent Hx: He was admitted 3/17/2019 as a level III trauma after a fall at home where he struck the LEFT side of his head on a nightstand. Per report the patient had switched sides of the bed with his wife; she was scheduled to have lumbar back surgery soon and sleeping on his side of the bed was easier for her. His neurological examination was normal with a GCS of 15 and his CT of the head did show a subdural hematoma over the LEFT hemisphere with a 5 mm LEFT to RIGHT shift. Neurosurgery was consulted and requested a 6 hour follow-up image which showed a significant increase in the size of the hematoma with marked mass effect and a 14 mm midline shift. The patient was taken emergently to the operating room by Dr. Mickey Browne for a craniectomy with evacuation of the LEFT subdural hematoma blood products on 3/17/2019. Since this procedure the patient has been somewhat more confused, has some noted hypoxia with a 3 L oxygen requirement via nasal cannula, a postoperative blood loss anemia with hemoglobin decreased 3 units and requiring 2 units of platelets to be transfused. The patient was on a daily inpatient rehabilitation unit in  after sustaining a subarachnoid hemorrhage after a fall with a brief loss of consciousness.   Since that admission he has been diagnosed with Parkinson disease in 2016 and is followed by  Bryan.     Past Medical History:   Diagnosis Date    Ankle fracture 2/2016    left in cast    Arthritis     BPH (benign prostatic hyperplasia)     CAD (coronary artery disease)     margarette    Cancer of bladder, neck ([facility])     Cervical vertebra fusion     Colon polyps 2010    [facility]    Foot drop, left 9/8/2015    GERD (gastroesophageal reflux disease)     Hyperlipemia     Macular degeneration     Melanoma ([address]     Neck, Scalp    RISHABH on CPAP     Parkinson disease ([address] 2016    Presbycusis of both ears, uses hearing aids     Spinal stenosis     Subdural hematoma ([address] 2015       resolved     Syncope     Traumatic subdural hemorrhage without loss of consciousness ([address] 3/17/2019     Past Surgical History:   Procedure Laterality Date    BACK SURGERY      BLADDER SURGERY  5/31/11    BLADDER TUMOR EXCISION  2011    BLEPHAROPLASTY  2007, 2008    CARDIAC CATHETERIZATION  2009    moderate    margarette    CARDIAC CATHETERIZATION  11/2018    CATARACT REMOVAL Bilateral 1995    CERVICAL FUSION  2009    COLONOSCOPY  9-24-10      [facility]  wnl    CORONARY ANGIOPLASTY WITH STENT PLACEMENT  11/2018     [facility] drug eluting stent dr Taryn Arenas Left 3/17/2019    LEFT  FRONTOTEMPOROPARIETAL CRANIECTOMY WITH EVACUATION OF LEFT PANHEMISPHERIC ACUTE SUBDURAL HEMATOMA performed by Vianey Polanco MD at [address]  .6/03/2015    CYSTOSCOPY  08/2018    bladder tumor    [place]    DIAGNOSTIC CARDIAC CATH LAB PROCEDURE      EYE SURGERY      HEMORRHOID SURGERY  1994    UT CYSTOURETHROSCOPY,FULGUR .5-2CM LESN N/A 8/3/2018    CYSTOSCOPY TRANSURETHRAL RESECTION BLADDER TUMOR, SMALL performed by Javi Akbar MD at [address]      left rivera - Dr Mortimer Knights      neck and scalp melanoma, cheek basal cell    SKIN CANCER EXCISION      left rivera - Dr Razo Earnest         Restrictions/Precautions:  Fall Risk, General Precautions  Other position/activity restrictions: Protective helmet on when up. OK to remove in supine only. Prior Level of Function:  ADL Assistance: Independent  Homemaking Assistance: Independent  Ambulation Assistance: Independent  Transfer Assistance: Independent  Additional Comments: Per spouse, pt was very indep with all ADL tasks at home. Pt completed all the driving at this time d/t spouse health problems. Pt did use cane in community. Pt.'s wife stated that his Parkinsons has caused him to have L sided arm and jaw tremors and she feels that it is currently effecting is recall and has difficulty with word finding. Subjective:  Chart Reviewed: Yes  Response To Previous Treatment: Patient reporting fatigue but able to participate. Family / Caregiver Present: Yes(wife)  Comments: Increased time taken on commode/transfering to commode, x2 bouts  Subjective: Pt sitting in [facility] with wife and sitter present. agreeable to therapy. Pt very tired and had a difficulty time keeping eyes opened. Slightly agitated towards end of session. Pain:  Denies. Social/Functional:  Lives With: Spouse  Type of Home: House  Home Layout: One level  Home Access: Stairs to enter with rails  Entrance Stairs - Number of Steps: 3  Entrance Stairs - Rails: Left  Home Equipment: Cane     Objective:     Transfers  Sit to Stand: Moderate Assistance;2 Person Assistance(Mod A x1 and CGA x1, from WC and commode, Max Vcs for set up, hand placement, safety, poor carry over.   Mod lean to R noted as pt became more fatigued)  Stand to sit: Moderate Assistance;2 Person Assistance;Minimal Assistance(Mod A x1 and Min A x1, Max Vcs and tactile cueing for safety, hand placement and to initiate movement of sitting down, to WC and commode)     Ambulation 1  Surface: level tile  Device: Rolling Walker  Other Apparatus: O2;Wheelchair follow(3L)  Assistance: Minimal assistance;Contact guard assistance;2 Person assistance  Quality of Gait: Forward flexed posture, decreased TKE, heavy reliance on AD, slight to mod lean to R, quick pace, unequal step lengths, mod foot clearance MELCHOR. Distance: 60ft x1, 50ft x1  Comments: increased lean to R noted during second bout. Pt requiring min A x1 at trunk d/t leaning too far forward and quick pace, Min A to CGA x1  Ambulation 2  Surface - 2: level tile  Device 2: No device(HHA x2)  Assistance 2: 2 Person assistance;Contact guard assistance;Minimal assistance  Quality of Gait 2: slow, increased difficulty processing task, Max Vcs for safety and foot placement, fwd flexed posture  Distance: 6ft x2, 4ft x2  Comments: WC <> commode    Balance  Comments: Static stand with HHA x2 for cloth management, min lean to right, fwd flexed, lacking TKE, Max Vcs for posture and knee extension, approx 45 sec. Static stand at AD, Mod to Min A x1, CGA to close SBA x1 for periclean up and cloth management, x1 min    Exercises:  Exercises  Comments: Pt completed seated MELCHOR LE ther ex, AAROM  to AROM for 5-8 reps ea, increased difficulty following verbal instructions and demonstrations, min recall of exs from first session, alt marching and LAQs. Pt unable to understand/process how to do ankle pumps or heel/toe raises. . Easily fatigued and required freq rest breaks, increased  agitation noted and pt wanting to keep crossing his legs. All to increase strength and endurance for improved functional mobility. Activity Tolerance:  Activity Tolerance: Patient limited by fatigue;Patient limited by endurance; Patient limited by cognitive status    Assessment: Body structures, Functions, Activity limitations: Decreased functional mobility , Decreased safe awareness, Decreased balance, Decreased coordination, Decreased ADL status, Decreased cognition, Decreased ROM, Decreased endurance, Decreased strength  Treatment Assessment: Pt tolerated session fairly well. continued with difficulty in following instructions for safety, tfers, gait, and ther ex.  Pt cont to be limited with responding to and completing tasks. Increased time taken with bathroom activities; commode set up, transferring to/from and use of commode, d/t high level of assist required. Pt did tolerate amb this date, however demo'd lateral lean and slightly unsteady gait. Pt would benefit from cont skilled PT services to increase functional mobility. Prognosis: Fair, Good   Assessment: Patient did not meet any goals thus far. Patient inconsistent with the amount of assist required. At times can be CGA for transfers, other times needs Mod A. Pt with decreased initiation, motor planning, and difficulty at times following simple one step commands. Pt also very fatigued needing increased time to complete activities. Patient with right sided lean with gait with and without RW, which was no so evident on evaluation. Pt will continue to benefit from skilled therapy to address functional mobility limitations for safe home going. Discharge Recommendations:  Discharge Recommendations: Continue to assess pending progress    Patient Education:  Patient Education: tfers, standing balance, ther ex, gait, POC  Barriers to Learning: cognition    Equipment Recommendations:  Equipment Needed: Yes(Continue to assess (possbile RW))    Safety:  Type of devices:  All fall risk precautions in place, Gait belt, Call light within reach, Sitter present, Chair alarm in place(pt left in [facility] with wife and ST)  Restraints  Initially in place: No    Plan:  Times per week: 5x/ week 90 minutes; 1x/week 30 minutes  Times per day: Daily  Current Treatment Recommendations: Strengthening, Transfer Training, Endurance Training, Neuromuscular Re-education, Patient/Caregiver Education & Training, ROM, ADL/Self-care Training, Balance Training, Gait Training, Home Exercise Program, Functional Mobility Training, Stair training, Safety Education & Training, Equipment Evaluation, Education, & procurement    Goals:       Short term goals---NO GOALS MET  Time Frame for Short term goals: 1 week   Short term goal 1: Pt to perform sit to stand with CGA for ability to transfer for increased mobility. Short term goal 2: Pt to perform all bed mobility with CGA for ability to get into and out of bed. Short term goal 3: Pt to negotiate 3 steps with consistent CGA for ability to get into and out of home. Short term goal 4: Pt to ambulate 100 feet with RW with consistent CGA for increased functional mobility. Short term goal 5: Pt to perform car transfer with CGA for ability to get to and from appointments. Long term goals  Time Frame for Long term goals : 3 weeks   Long term goal 1: Pt to perform sit to stand with supervision for ability to transfer for walking. Long term goal 2: Pt. to perform all bed mobility with supervision for ability to get into and out of bed. Long term goal 3: Pt to negotiate 3 steps with stand by assistance for ability to get into and out of home. Long term goal 4: Pt to ambulate 200 feet with RW with stand by assistance; 100 feet without AD with stand by assistance for increased functional mobility. Long term goal 5: Pt. to perform car transfer with supervision for ability to get to and from appointments. If patient is discharged prior to progress note completion, this note is to serve as the discharge note with all goals being unmet unless indicated otherwise. Treatment session and note completed by Tita Alejandra PTA.   Assessment and goal revision of Progress Note completed by Antonia Oneil PT.

## 2019-03-27 NOTE — PROGRESS NOTES
[address] [address] THERAPY  [facility]  DAILY NOTE    Time:  Time In:   Time Out: 1522  Timed Code Treatment Minutes: 47 Minutes  Minutes: 47     Date: 3/27/2019  Patient Name: Jesi Barger,   Gender: male      Room: [facility]/056-A  MRN: [id number]  : 1931  (80 y.o.)  Referring Practitioner: Dr. Jesus Chaparro  Diagnosis: subdural hematoma due to concussion without loss of consciousness  Additional Pertinent Hx: He was admitted 3/17/2019 as a level III trauma after a fall at home where he struck the LEFT side of his head on a nightstand. Per report the patient had switched sides of the bed with his wife; she was scheduled to have lumbar back surgery soon and sleeping on his side of the bed was easier for her. His neurological examination was normal with a GCS of 15 and his CT of the head did show a subdural hematoma over the LEFT hemisphere with a 5 mm LEFT to RIGHT shift. Neurosurgery was consulted and requested a 6 hour follow-up image which showed a significant increase in the size of the hematoma with marked mass effect and a 14 mm midline shift. The patient was taken emergently to the operating room by Dr. Harish Cerna for a craniectomy with evacuation of the LEFT subdural hematoma blood products on 3/17/2019. Since this procedure the patient has been somewhat more confused, has some noted hypoxia with a 3 L oxygen requirement via nasal cannula, a postoperative blood loss anemia with hemoglobin decreased 3 units and requiring 2 units of platelets to be transfused. The patient was on a daily inpatient rehabilitation unit in  after sustaining a subarachnoid hemorrhage after a fall with a brief loss of consciousness.   Since that admission he has been diagnosed with Parkinson disease in 2016 and is followed by Dr. Michael Gomez.    Past Medical History:   Diagnosis Date    Ankle fracture 2016    left in cast    Arthritis     BPH (benign prostatic hyperplasia)     CAD (coronary artery disease)     margarette    Cancer of bladder, neck ([address]     Cervical vertebra fusion     Colon polyps 2010    [place]han    Foot drop, left 9/8/2015    GERD (gastroesophageal reflux disease)     Hyperlipemia     Macular degeneration     Melanoma ([address]     Neck, Scalp    RISHABH on CPAP     Parkinson disease ([address] 2016    Presbycusis of both ears, uses hearing aids     Spinal stenosis     Subdural hematoma ([address] 2015       resolved     Syncope     Traumatic subdural hemorrhage without loss of consciousness ([address] 3/17/2019     Past Surgical History:   Procedure Laterality Date    BACK SURGERY      BLADDER SURGERY  5/31/11    BLADDER TUMOR EXCISION  2011    BLEPHAROPLASTY  2007, 2008    CARDIAC CATHETERIZATION  2009    moderate    margarette    CARDIAC CATHETERIZATION  11/2018    CATARACT REMOVAL Bilateral 1995    CERVICAL FUSION  2009    COLONOSCOPY  9-24-10      [place]  wnl    CORONARY ANGIOPLASTY WITH STENT PLACEMENT  11/2018     [facility] drug eluting stent dr Lynn Hang Left 3/17/2019    LEFT  FRONTOTEMPOROPARIETAL CRANIECTOMY WITH EVACUATION OF LEFT PANHEMISPHERIC ACUTE SUBDURAL HEMATOMA performed by Ariella Hoffman MD at [address] [facility] [address]  .6/03/2015    CYSTOSCOPY  08/2018    bladder tumor    [place]    DIAGNOSTIC CARDIAC CATH LAB PROCEDURE      EYE SURGERY      HEMORRHOID SURGERY  1994    [place] CYSTOURETHROSCOPY,FULGUR .5-2CM LESN N/A 8/3/2018    CYSTOSCOPY TRANSURETHRAL RESECTION BLADDER TUMOR, SMALL performed by Daja Mckeon MD at [address]      left rivera - Dr Asa Loza      neck and scalp melanoma, cheek basal cell    SKIN CANCER EXCISION      left [place] - Dr Erin Jon         Restrictions/Precautions:  Fall Risk, General Precautions      Other position/activity restrictions: Protective helmet on when up. OK to remove in supine only.         Prior Level of Function:  ADL Assistance: Independent  Homemaking Assistance: Independent  Ambulation Assistance: Independent  Transfer Assistance: Independent  Additional Comments: Per spouse, pt was very indep with all ADL tasks at home. Pt completed all the driving at this time d/t spouse health problems. Pt did use cane in community. Pt.'s wife stated that his Parkinsons has caused him to have L sided arm and jaw tremors and she feels that it is currently effecting is recall and has difficulty with word finding. Subjective       Subjective: cooperative. fatigued. Nursing reports pt only slept 3 hours last evening. Pt required up to moderate increased time for processing speed and attention to task. more figidity. Pain:  Pain Assessment  Patient Currently in Pain: Denies       Objective     Bed mobility    Sit to Supine: Minimal assistance(mod increased time needed for processing speed)    Transfers  Sit to stand: Minimal assistance  Stand to sit: Minimal assistance  Transfer Comments: much increased time needed for assisting with hand placement and sit to stand from the EOB and w/c . Multiple cues for safe hand palcement. Poor problem solving and processing speed, not following commands well. INcreased time allowed for processing speed. Type of ROM/Therapeutic Exercise: AAROM  Exercises  Shoulder Flexion: x 8 reps, mod cues for attention to task as well as participating in AAROM due to fatigue. Shoulder ABduction: x 8 reps with AAROM. Horizontal ABduction: x 8 reps with AAROM. Other: chest press x 8 reps with AAROM. Trialed for pt to count as he completed the exercises, unsuccessful. Groomin -max A for washing hands sinkside seated. Toiletin - Total assist(mod A for clothing mgt up and down.  mod A for wiping.) Toilet Transfer: 2 - Requires 50-74% assist getting off toilet(min A to lift and lower to a std height toilet. mod verbal and tacile cueing for hand placement. )                                                                                                                                                                                    Activity Tolerance:  Activity Tolerance: Patient limited by fatigue;Treatment limited secondary to decreased cognition    Assessment:  Assessment: Pt has not met any goals due to only being seen for an evaluaiton and 1 treatment on the inpt rehab unit. Pt demo deficits in right UE and need for increased assistance during ADL task. He currntly needs max cueing for initiation and problem solving as well as processing speed for basic care tasks. . Pt demo decreased standing balance during ADL tasks. Pt would benefit from skilled OT Services to increase his endurance for ADL tasks and mobility to eventual return home. Performance deficits / Impairments: Decreased functional mobility , Decreased safe awareness, Decreased ROM, Decreased endurance, Decreased balance, Decreased ADL status, Decreased strength, Decreased cognition, Decreased high-level IADLs  Prognosis: Fair    Discharge Recommendations:  Discharge Recommendations: Continue to assess pending progress    Patient Education:  Patient Education: attention to task, problem solving with ADLs. Equipment Recommendations: Other: Pt has a shower chair, grab bars in BR and shower. Safety:  Safety Devices in place: Yes  Type of devices:  All fall risk precautions in place, Call light within reach, Gait belt, Patient at risk for falls, Left in bed, bed alarm in place    Plan:  Times per week: 5xs week x 90 min, 1 x week x 30 min  Current Treatment Recommendations: Strengthening, Balance Training, Endurance Training, Neuromuscular Re-education, Patient/Caregiver Education & Training, Self-Care / ADL, Safety Education & Training, Functional Mobility Training, Home Management Training, Equipment Evaluation, Education, & procurement, Cognitive/Perceptual Training    Goals:  Patient goals : spouse reporting she wants pt to get stronger and eventually return home after rehab. Pt stated \"yeah\"    Short term goals  Time Frame for Short term goals: 1 week  Short term goal 1: Pt will initiate ADL routine with no > mod cues for attention to task and continueation of task. to increase initiation into daily activities. Short term goal 2: PT will complete functional ambulation to and from the BR with no > min A x 1 and RW with no > Mod cues for R LE placement and awareness to increase ability tc complete toileting routine  Short term goal 3: Pt will incorporate R hand into daily dressing tasks including opening the hand to grasp for items with no > mod cues for increased independence in grooming tasksq  Short term goal 4: Pt to demo dynamic standing balance > 2 min with 0-1 UE support when reaching outside base of support with CGA in prep for completing clothing management after toileting and sinkside ADL tasks   Long term goals  Time Frame for Long term goals : 4-5 weeks  Long term goal 1: Pt will complete ADL routine with no > cues for initiation to task and problem solving tasks to increase independence in self care tasks  Long term goal 2: PT will complete functional ambulation to and from the BR as well as around obstacles with SBA and no > min cues for attention to safe mobiltiy to increase independence in self care tasks  Long term goal 3: Pt will follow 1 step commands for basic homemaking tasks with no > min cues for attention to task to increase independence in retrieving a snack. If patient is discharged prior to progress note completion, this note is to serve as the discharge note with all goals being unmet unless indicated otherwise.

## 2019-03-27 NOTE — PROGRESS NOTES
MBS cancelled due to patient being sleepy and not very alert. Speech did assist patient with breakfast. Pills were given by me one by one with applesauce. Patient did a lot of throat clearing and coughing. Will continue with one on one feeds and monitoring lung sounds after meals. Light lamp on for 30 min. Sitter in room. Wife present.

## 2019-03-27 NOTE — PROGRESS NOTES
[address] [facility] THERAPY MISSED TREATMENT NOTE  [facility]    Variance Minutes: -30 minutes       Date: 3/27/2019  Patient Name: Hamlet Abdullahi        MRN: [id number]    : 1931  (80 y.o.)    REASON FOR MISSED TREATMENT:  Attempted to complete scheduled MBS at 9:00. Upon arrival to radiology suite patient with significant fatigue; unable to maintain appropriate attention and poor ability to follow very basic 1 step commands. Patient NOT approrapite for completion of MBS this AM.  Per chart review and nurse tech patient with only 2-3 hours of sleep last night and \"just fell asleep\" before transporting down to radiology. ST to hold MBS this date and plan to complete mid-morning 3/28 as appropriate. ST with plan to complete dysphagia treatment to follow. Will plan to complete f/u dysphagia treatment this date as appropriate.      Tien Mcdaniel M.S. [facility]io [place]

## 2019-03-27 NOTE — PROGRESS NOTES
OK to remove in supine only. Past Medical History:   Diagnosis Date    Ankle fracture 2/2016    left in cast    Arthritis     BPH (benign prostatic hyperplasia)     CAD (coronary artery disease)     margarette    Cancer of bladder, neck (HCC)     Cervical vertebra fusion     Colon polyps 2010    [place]    Foot drop, left 9/8/2015    GERD (gastroesophageal reflux disease)     Hyperlipemia     Macular degeneration     Melanoma ([address]     Neck, Scalp    RISHABH on CPAP     Parkinson disease ([address] 2016    Presbycusis of both ears, uses hearing aids     Spinal stenosis     Subdural hematoma ([address] 2015       resolved     Syncope     Traumatic subdural hemorrhage without loss of consciousness ([address] 3/17/2019     Past Surgical History:   Procedure Laterality Date    BACK SURGERY      BLADDER SURGERY  5/31/11    BLADDER TUMOR EXCISION  2011    BLEPHAROPLASTY  2007, 2008    CARDIAC CATHETERIZATION  2009    moderate    margarette    CARDIAC CATHETERIZATION  11/2018    CATARACT REMOVAL Bilateral 1995    CERVICAL FUSION  2009    COLONOSCOPY  9-24-10      [place]  wnl    CORONARY ANGIOPLASTY WITH STENT PLACEMENT  11/2018     [facility] drug eluting stent dr Sherine Pruett Left 3/17/2019    LEFT  FRONTOTEMPOROPARIETAL CRANIECTOMY WITH EVACUATION OF LEFT PANHEMISPHERIC ACUTE SUBDURAL HEMATOMA performed by Claudio Almendarez MD at [address] [facility].  .6/03/2015    CYSTOSCOPY  08/2018    bladder tumor    [place]    DIAGNOSTIC CARDIAC CATH LAB PROCEDURE      EYE SURGERY      HEMORRHOID SURGERY  1994    [place] CYSTOURETHROSCOPY,FULGUR .5-2CM LESN N/A 8/3/2018    CYSTOSCOPY TRANSURETHRAL RESECTION BLADDER TUMOR, SMALL performed by Ghanshyam Santiago MD at [address]      left nicole Lanza      neck and scalp melanoma, cheek basal cell    SKIN CANCER EXCISION      left nicole Moise    TONSILLECTOMY             Subjective       Subjective: cooperative. TOILET TRANSFER 3 - Requires 25-49% assist getting off toilet(min A sit to stand from a std height toilet.) 2 - Requires 50-74% assist getting off toilet(min A to lift and lower to a std height toilet. mod verbal and tacile cueing for hand placement. ) 4 Not Met and Continue   TUB/SHOWER TRANSFER                     4 Not Met and Continue     SOCIAL COGNITION  Social Interaction: 1 - Patient appropriate < 25% of the time  Problem Solvin - Patient solves simple/routine tasks < 25%  Memory: 1 - Patient remembers < 25% of the time  COMMUNICATION  Comprehension: 1 - Patient understands basic needs <25% of the time  Expression: 1 - Expresses basic ideas/needs < 25% of the time             Activity Tolerance:  Activity Tolerance: Patient limited by fatigue;Treatment limited secondary to decreased cognition    Assessment:  Performance deficits / Impairments: Decreased functional mobility , Decreased safe awareness, Decreased ROM, Decreased endurance, Decreased balance, Decreased ADL status, Decreased strength, Decreased cognition, Decreased high-level IADLs  Assessment: Pt has not met any goals due to only being seen for an evaluaiton and 1 treatment on the inpt rehab unit. Pt demo deficits in right UE and need for increased assistance during ADL task. He currntly needs max cueing for initiation and problem solving as well as processing speed for basic care tasks. . Pt demo decreased standing balance during ADL tasks. Pt would benefit from skilled OT Services to increase his endurance for ADL tasks and mobility to eventual return home. Prognosis: Fair    Discharge Recommendations:  Discharge Recommendations: Continue to assess pending progress    Patient Education:  Patient Education: attention to task, problem solving with ADLs. Equipment Recommendations: Other: Pt has a shower chair, grab bars in BR and shower. Safety:  Safety Devices in place: Yes  Type of devices:  All fall risk precautions in place, Call light within reach, Gait belt, Patient at risk for falls, Left in chair, Chair alarm in place    Plan:  Times per week: 5xs week x 90 min, 1 x week x 30 min  Current Treatment Recommendations: Strengthening, Balance Training, Endurance Training, Neuromuscular Re-education, Patient/Caregiver Education & Training, Self-Care / ADL, Safety Education & Training, Functional Mobility Training, Home Management Training, Equipment Evaluation, Education, & procurement, Cognitive/Perceptual Training    Goals:  Patient goals : spouse reporting she wants pt to get stronger and eventually return home after rehab. Pt stated \"yeah\"    Short term goals  Time Frame for Short term goals: 1 week  Short term goal 1: Pt will initiate ADL routine with no > mod cues for attention to task and continueation of task. to increase initiation into daily activities.  NOT MET CONTINUE  Short term goal 2: PT will complete functional ambulation to and from the BR with no > min A x 1 and RW with no > Mod cues for R LE placement and awareness to increase ability tc complete toileting routine NOT MET CONTINUE  Short term goal 3: Pt will incorporate R hand into daily dressing tasks including opening the hand to grasp for items with no > mod cues for increased independence in grooming tasksq NOT MET CONTINUE  Short term goal 4: Pt to demo dynamic standing balance > 2 min with 0-1 UE support when reaching outside base of support with CGA in prep for completing clothing management after toileting and sinkside ADL tasks NOT MET CONTINUE    Long term goals  Time Frame for Long term goals : 4-5 weeks  Long term goal 1: Pt will complete ADL routine with no > cues for initiation to task and problem solving tasks to increase independence in self care tasks NOT MET CONTINUE  Long term goal 2: PT will complete functional ambulation to and from the BR as well as around obstacles with SBA and no > min cues for attention to safe mobiltiy to increase independence in self care tasks NOT MET CONTINUE  Long term goal 3: Pt will follow 1 step commands for basic homemaking tasks with no > min cues for attention to task to increase independence in retrieving a snack.  NOT MET CONTINUE

## 2019-03-28 NOTE — PROGRESS NOTES
[address] - 7E-56/056-A    Time In: 830  Time Out: 0179  Timed Code Treatment Minutes: 80 Minutes  Minutes: 80          Date: 3/28/2019  Patient Name: Jen Fallon,  Gender:  male        MRN: [id number]  : 1931  (80 y.o.)  Referral Date : 19  Referring Practitioner: Hector Lyles MD  Diagnosis: Subdural Hematoma due to concussion without loss of conciousness   Treatment Diagnosis: decreased balance, decreased strength, decreased endurance, unsteady gait   Additional Pertinent Hx: He was admitted 3/17/2019 as a level III trauma after a fall at home where he struck the LEFT side of his head on a nightstand. Per report the patient had switched sides of the bed with his wife; she was scheduled to have lumbar back surgery soon and sleeping on his side of the bed was easier for her. His neurological examination was normal with a GCS of 15 and his CT of the head did show a subdural hematoma over the LEFT hemisphere with a 5 mm LEFT to RIGHT shift. Neurosurgery was consulted and requested a 6 hour follow-up image which showed a significant increase in the size of the hematoma with marked mass effect and a 14 mm midline shift. The patient was taken emergently to the operating room by Dr. Tyron Dancer for a craniectomy with evacuation of the LEFT subdural hematoma blood products on 3/17/2019. Since this procedure the patient has been somewhat more confused, has some noted hypoxia with a 3 L oxygen requirement via nasal cannula, a postoperative blood loss anemia with hemoglobin decreased 3 units and requiring 2 units of platelets to be transfused. The patient was on a daily inpatient rehabilitation unit in  after sustaining a subarachnoid hemorrhage after a fall with a brief loss of consciousness.   Since that admission he has been diagnosed with Parkinson disease in 2016 and is followed by Dr. Beth Valladares. position/activity restrictions: Protective helmet on when up. OK to remove in supine only. Prior Level of Function:  ADL Assistance: Independent  Homemaking Assistance: Independent  Ambulation Assistance: Independent  Transfer Assistance: Independent  Additional Comments: Per spouse, pt was very indep with all ADL tasks at home. Pt completed all the driving at this time d/t spouse health problems. Pt did use cane in community. Pt.'s wife stated that his Parkinsons has caused him to have L sided arm and jaw tremors and she feels that it is currently effecting is recall and has difficulty with word finding. Subjective:     Subjective: Patient sitting up in [facility] upon arrival, just finishing eating breakfast with sitter at start of session. Patient demonstrating very garbled speech and difficult to understand patient during session, slow processing with difficulty understanding verbal cues provided. Sage's wife present in room but not coming along for session, reports not sleeping well last night. Pain:  Denies. Pain Assessment  Pain Assessment: (denied pain when asked but groaning throughout session )       Social/Functional:  Lives With: Spouse  Type of Home: House  Home Layout: One level  Home Access: Stairs to enter with rails  Entrance Stairs - Number of Steps: 3  Entrance Stairs - Rails: Left  Home Equipment: Cane     Objective:  Sit to Supine: Minimal assistance(Min. A x2 (x1 at trunk and x1 at [place] LEs))  Scooting: Contact guard assistance(to scoot forward and back in seated position)    Transfers  Sit to Stand: Moderate Assistance;2 Person Assistance(Mod. A x2 majority of transfers; 1 attempt was MOd. A x1 of and Min. A of another;  hand over hand for pushing from chair;  poor carry over;  min-mod lean to R side; poor processing)  Stand to sit: Moderate Assistance;Minimal Assistance(MOd. A x1 and Min.  A of another;  max verbal cues for backing up to chair and reaching back before trying to Decreased safe awareness, Decreased balance, Decreased coordination, Decreased ADL status, Decreased cognition, Decreased ROM, Decreased endurance, Decreased strength  Assessment: Patient tolerated session fair,  continues to have difficulties with following instructions given throughout treatment to improve safety. Patient demonstrating more of a R sided lean when in standing this date,  did show some improvement to midline with gait training. Patient required +2 for majority of session due to slow processisng and increased instability when in standing and completing transfers. Will need additional skilled PT to increase strength, endurance, balance and safety for improved functional mobility. Prognosis: Fair, Good       Discharge Recommendations:  Discharge Recommendations: Continue to assess pending progress    Patient Education:  Patient Education: POC. transfers, gait training, midline orientation, safety   Barriers to Learning: cognition    Equipment Recommendations:  Equipment Needed: Yes(Continue to assess (erlinda CAMPBELL))    Safety:  Type of devices: All fall risk precautions in place, Patient at risk for falls, Call light within reach, Left in bed, Bed alarm in place, Gait belt, Sitter present, Nurse notified  Restraints  Initially in place: No    Plan:  Times per week: 5x/ week 90 minutes; 1x/week 30 minutes  Times per day: Daily  Current Treatment Recommendations: Strengthening, Transfer Training, Endurance Training, Neuromuscular Re-education, Patient/Caregiver Education & Training, ROM, ADL/Self-care Training, Balance Training, Gait Training, Home Exercise Program, Functional Mobility Training, Stair training, Safety Education & Training, Equipment Evaluation, Education, & procurement    Goals:       Short term goals  Time Frame for Short term goals: 1 week   Short term goal 1: Pt to perform sit to stand with CGA for ability to transfer for increased mobility.    Short term goal 2: Pt to perform all bed mobility with CGA for ability to get into and out of bed. Short term goal 3: Pt to negotiate 3 steps with consistent CGA for ability to get into and out of home. Short term goal 4: Pt to ambulate 100 feet with RW with consistent CGA for increased functional mobility. Short term goal 5: Pt to perform car transfer with CGA for ability to get to and from appointments. Long term goals  Time Frame for Long term goals : 3 weeks   Long term goal 1: Pt to perform sit to stand with supervision for ability to transfer for walking. Long term goal 2: Pt. to perform all bed mobility with supervision for ability to get into and out of bed. Long term goal 3: Pt to negotiate 3 steps with stand by assistance for ability to get into and out of home. Long term goal 4: Pt to ambulate 200 feet with RW with stand by assistance; 100 feet without AD with stand by assistance for increased functional mobility. Long term goal 5: Pt. to perform car transfer with supervision for ability to get to and from appointments. If patient is discharged prior to progress note completion, this note is to serve as the discharge note with all goals being unmet unless indicated otherwise.

## 2019-03-28 NOTE — PROGRESS NOTES
Jarvis Mercado is a 80 y.o. male patient.     Current Facility-Administered Medications   Medication Dose Route Frequency Provider Last Rate Last Dose    traZODone (DESYREL) tablet 75 mg  75 mg Oral Nightly Jersey Cummings MD        potassium replacement protocol   Other RX Placeholder Rebecca Connell MD        hydrocortisone 1 % cream   Topical BID Rebecca Connell MD        vitamin D (CHOLECALCIFEROL) tablet 1,000 Units  1,000 Units Oral Daily Jersey Cummings MD   1,000 Units at 03/27/19 3222    finasteride (PROSCAR) tablet 5 mg  5 mg Oral Daily Jersey Cummings MD   5 mg at 03/27/19 1768    acetaminophen (TYLENOL) tablet 650 mg  650 mg Oral Q4H PRN Rebeka Florentino PA-C   650 mg at 03/26/19 1948    bacitracin ophthalmic ointment   Topical TID Rebeka Florentino PA-C        famotidine (PEPCID) tablet 20 mg  20 mg Oral BID Rebeka Florentino PA-C   20 mg at 03/27/19 9458    magnesium hydroxide (MILK OF MAGNESIA) 400 MG/5ML suspension 30 mL  30 mL Oral Daily PRN Rebeka Florentino PA-C        sodium chloride flush 0.9 % injection 10 mL  10 mL Intravenous 2 times per day Rebeka Florentino PA-C   10 mL at 03/26/19 1027    sodium chloride flush 0.9 % injection 10 mL  10 mL Intravenous PRN Rebeka Florentino PA-C        amLODIPine (NORVASC) tablet 2.5 mg  2.5 mg Oral Daily Rebeka Florentino PA-C   2.5 mg at 03/27/19 9073    atorvastatin (LIPITOR) tablet 10 mg  10 mg Oral Nightly Rebeka Florentino PA-C   10 mg at 03/26/19 1947    levETIRAcetam (KEPPRA) tablet 500 mg  500 mg Oral BID Rebeka Florentino PA-C   500 mg at 03/27/19 6096    multivitamin 1 tablet  1 tablet Oral Daily Rebeka Florentino PA-C   1 tablet at 03/27/19 1243    tamsulosin (FLOMAX) capsule 0.4 mg  0.4 mg Oral Nightly Rebeka Florentino PA-C   0.4 mg at 03/26/19 1948    miconazole (MICOTIN) 2 % powder   Topical BID Jersey Cummings MD        docusate sodium (COLACE) capsule 100 mg  100 mg Oral BID PRN Jersey Cummings MD       [employer] ondansetron (ZOFRAN) tablet 4 mg  4 mg Oral Q8H PRN Susanne Garcia MD        [facility]) tablet 17.2 mg  2 tablet Oral Nightly PRN Susanne Garcia MD        carbidopa-levodopa (SINEMET)  MG per tablet 1.5 tablet  1.5 tablet Oral 3 times per day Susanne Garcia MD   1.5 tablet at 03/27/19 1745    And    rOPINIRole (REQUIP) tablet 0.5 mg  0.5 mg Oral 3 times per day Susanne Garcia MD   0.5 mg at 03/27/19 1746     Allergies   Allergen Reactions    Sulfa Antibiotics      Unknown reaction from childhood     Active Problems:    Subdural hematoma due to concussion [facility])  Resolved Problems:    * No resolved hospital problems. *    Blood pressure (!) 170/72, pulse 72, temperature 98.1 °F (36.7 °C), resp. rate 16, height 5' 10\" (1.778 m), weight 123 lb 1.6 oz (55.8 kg), SpO2 94 %. Subjective:  Symptoms:  Stable. Diet:  Adequate intake. Activity level: Impaired due to weakness. Pain:  He complains of pain that is mild. Objective:  General Appearance: In no acute distress. Vital signs: (most recent): Blood pressure (!) 170/72, pulse 72, temperature 98.1 °F (36.7 °C), resp. rate 16, height 5' 10\" (1.778 m), weight 123 lb 1.6 oz (55.8 kg), SpO2 94 %. Vital signs are normal.    Output: Producing urine. HEENT: (S/p left craniotomy)    Lungs:  Normal effort and normal respiratory rate. Heart: Normal rate. Regular rhythm. S1 normal and S2 normal.  No murmur. Abdomen: Abdomen is soft. Bowel sounds are normal.   There is no abdominal tenderness. Extremities: Normal range of motion. Neurological: Patient is alert. (Speech is limited and gait unsteady and ignore the  right side . Ataxia  Noted and limited speech  With cognitive expressive and receptive  Change ). Skin:  Warm and dry. Assessment:    Condition: In stable condition. Unchanged.    (Post  Subdural evacuation of left  Side  And with weakness     htn stable      ashd  stable      parkinsons  No  Change Traumatic  brain injury  With  Right side   Weakness  Post left subdural       bph stable     ). Plan:   Per physical therapy. Consults: physical therapy, occupational therapy and speech therapy. Advance diet as tolerated. Administer medications as ordered. (Need  Of intensive  Pt and   OT and speech therapies     bp  Stable      extremely restless).        Cristiano Adam MD  3/27/2019

## 2019-03-28 NOTE — PROGRESS NOTES
Past Medical History:   Diagnosis Date    Ankle fracture 2/2016    left in cast    Arthritis     BPH (benign prostatic hyperplasia)     CAD (coronary artery disease)     margarette    Cancer of bladder, neck (HCC)     Cervical vertebra fusion     Colon polyps 2010    [facility]    Foot drop, left 9/8/2015    GERD (gastroesophageal reflux disease)     Hyperlipemia     Macular degeneration     Melanoma ([address]     Neck, Scalp    RISHABH on CPAP     Parkinson disease ([address] 2016    Presbycusis of both ears, uses hearing aids     Spinal stenosis     Subdural hematoma ([address] 2015       resolved     Syncope     Traumatic subdural hemorrhage without loss of consciousness ([address] 3/17/2019     Past Surgical History:   Procedure Laterality Date    BACK SURGERY      BLADDER SURGERY  5/31/11    BLADDER TUMOR EXCISION  2011    BLEPHAROPLASTY  2007, 2008    CARDIAC CATHETERIZATION  2009    moderate    margarette    CARDIAC CATHETERIZATION  11/2018    CATARACT REMOVAL Bilateral 1995    CERVICAL FUSION  2009    COLONOSCOPY  9-24-10      [facility]  wnl    CORONARY ANGIOPLASTY WITH STENT PLACEMENT  11/2018     [facility] drug eluting stent dr Jose Acuña Left 3/17/2019    LEFT  FRONTOTEMPOROPARIETAL CRANIECTOMY WITH EVACUATION OF LEFT PANHEMISPHERIC ACUTE SUBDURAL HEMATOMA performed by Emperatriz Britt MD at [facility] 9  .6/03/2015    CYSTOSCOPY  08/2018    bl[place]    DIAGNOSTIC CARDIAC CATH LAB PROCEDURE      EYE SURGERY      HEMORRHOID SURGERY  1994    [place] CYSTOURETHROSCOPY,FULGUR .5-2CM LESN N/A 8/3/2018    CYSTOSCOPY TRANSURETHRAL RESECTION BLADDER TUMOR, SMALL performed by Renée Benitez MD at [address]      left nicole Robbins      neck and scalp melanoma, cheek basal cell    SKIN CANCER EXCISION      left [place] - Dr Alan Green         Restrictions/Precautions:  Fall Risk, General Precautions                    Other position/activity restrictions: Protective helmet on when up. OK to remove in supine only. Prior Level of Function:  ADL Assistance: Independent  Homemaking Assistance: Independent  Ambulation Assistance: Independent  Transfer Assistance: Independent  Additional Comments: Per spouse, pt was very indep with all ADL tasks at home. Pt completed all the driving at this time d/t spouse health problems. Pt did use cane in community. Pt.'s wife stated that his Parkinsons has caused him to have L sided arm and jaw tremors and she feels that it is currently effecting is recall and has difficulty with word finding. Subjective:     Subjective: Patient in bed upon arrival, wife in room and reports that patient is very tired and just started to rest when RN came in to give meds. Patient not following verbal cues very well during session when trying to complete exercises. Pain:  Yes. Pain Assessment  Pain Assessment: Faces(patient groaning throughout session, unable to assess pain level)       Social/Functional:  Lives With: Spouse  Type of Home: House  Home Layout: One level  Home Access: Stairs to enter with rails  Entrance Stairs - Number of Steps: 3  Entrance Stairs - Rails: Left  Home Equipment: Cane     Objective:  Sit to Supine: Minimal assistance(Min. A x2 (x1 at trunk and x1 at [place] LEs))  Scooting: Contact guard assistance(to scoot forward and back in seated position)    Transfers  Sit to Stand: Moderate Assistance;2 Person Assistance(Mod. A x2 majority of transfers; 1 attempt was MOd. A x1 of and Min. A of another;  hand over hand for pushing from chair;  poor carry over;  min-mod lean to R side; poor processing)  Stand to sit: Moderate Assistance;Minimal Assistance(MOd. A x1 and Min.  A of another;  max verbal cues for backing up to chair and reaching back before trying to sit,  patient impulsive to sit when becoming fatigued)    Exercises:  Exercises  Comments: Attempted to complete exercises in bed Program, Functional Mobility Training, Stair training, Safety Education & Training, Equipment Evaluation, Education, & procurement    Goals:       Short term goals  Time Frame for Short term goals: 1 week   Short term goal 1: Pt to perform sit to stand with CGA for ability to transfer for increased mobility. Short term goal 2: Pt to perform all bed mobility with CGA for ability to get into and out of bed. Short term goal 3: Pt to negotiate 3 steps with consistent CGA for ability to get into and out of home. Short term goal 4: Pt to ambulate 100 feet with RW with consistent CGA for increased functional mobility. Short term goal 5: Pt to perform car transfer with CGA for ability to get to and from appointments. Long term goals  Time Frame for Long term goals : 3 weeks   Long term goal 1: Pt to perform sit to stand with supervision for ability to transfer for walking. Long term goal 2: Pt. to perform all bed mobility with supervision for ability to get into and out of bed. Long term goal 3: Pt to negotiate 3 steps with stand by assistance for ability to get into and out of home. Long term goal 4: Pt to ambulate 200 feet with RW with stand by assistance; 100 feet without AD with stand by assistance for increased functional mobility. Long term goal 5: Pt. to perform car transfer with supervision for ability to get to and from appointments. If patient is discharged prior to progress note completion, this note is to serve as the discharge note with all goals being unmet unless indicated otherwise.

## 2019-03-28 NOTE — PROGRESS NOTES
[address]  DAILY NOTE    Time:  Time In: 1220  Time Out: 1340  Timed Code Treatment Minutes: 80 Minutes  Minutes: 80         MT x 10 min due to fatigue nad falling asleep  In supine at EOS. Date: 3/28/2019  Patient Name: Chikis Sol,   Gender: male      Room: [facility]/056-A  MRN: [id number]  : 1931  (80 y.o.)  Referring Practitioner: Dr. Evelyn Schilling  Diagnosis: subdural hematoma due to concussion without loss of consciousness  Additional Pertinent Hx: He was admitted 3/17/2019 as a level III trauma after a fall at home where he struck the LEFT side of his head on a nightstand. Per report the patient had switched sides of the bed with his wife; she was scheduled to have lumbar back surgery soon and sleeping on his side of the bed was easier for her. His neurological examination was normal with a GCS of 15 and his CT of the head did show a subdural hematoma over the LEFT hemisphere with a 5 mm LEFT to RIGHT shift. Neurosurgery was consulted and requested a 6 hour follow-up image which showed a significant increase in the size of the hematoma with marked mass effect and a 14 mm midline shift. The patient was taken emergently to the operating room by Dr. Josselyn Rosenbaum for a craniectomy with evacuation of the LEFT subdural hematoma blood products on 3/17/2019. Since this procedure the patient has been somewhat more confused, has some noted hypoxia with a 3 L oxygen requirement via nasal cannula, a postoperative blood loss anemia with hemoglobin decreased 3 units and requiring 2 units of platelets to be transfused. The patient was on a daily inpatient rehabilitation unit in  after sustaining a subarachnoid hemorrhage after a fall with a brief loss of consciousness.   Since that admission he has been diagnosed with Parkinson disease in 2016 and is followed by Dr. Alyssa Barriga.    Past Medical History:   Diagnosis Date    Ankle fracture 2/2016    left in cast    Arthritis     BPH (benign prostatic hyperplasia)     CAD (coronary artery disease)     margarette    Cancer of bladder, neck (HCC)     Cervical vertebra fusion     Colon polyps 2010    [place]    Foot drop, left 9/8/2015    GERD (gastroesophageal reflux disease)     Hyperlipemia     Macular degeneration     Melanoma ([address]     Neck, Scalp    RISHABH on CPAP     Parkinson disease ([address] 2016    Presbycusis of both ears, uses hearing aids     Spinal stenosis     Subdural hematoma ([address] 2015       resolved     Syncope     Traumatic subdural hemorrhage without loss of consciousness ([address] 3/17/2019     Past Surgical History:   Procedure Laterality Date    BACK SURGERY      BLADDER SURGERY  5/31/11    BLADDER TUMOR EXCISION  2011    BLEPHAROPLASTY  2007, 2008    CARDIAC CATHETERIZATION  2009    moderate    margarette    CARDIAC CATHETERIZATION  11/2018    CATARACT REMOVAL Bilateral 1995    CERVICAL FUSION  2009    COLONOSCOPY  9-24-10      [place]  wnl    CORONARY ANGIOPLASTY WITH STENT PLACEMENT  11/2018     [facility] drug eluting stent dr Tanna Wagner Left 3/17/2019    LEFT  FRONTOTEMPOROPARIETAL CRANIECTOMY WITH EVACUATION OF LEFT PANHEMISPHERIC ACUTE SUBDURAL HEMATOMA performed by Vannessa Rangel MD at [facility] 9  .6/03/2015    CYSTOSCOPY  08/2018    bladder tumor    [place]    DIAGNOSTIC CARDIAC CATH LAB PROCEDURE      EYE SURGERY      HEMORRHOID SURGERY  1994    [place] CYSTOURETHROSCOPY,FULGUR .5-2CM KELSIE N/A 8/3/2018    CYSTOSCOPY TRANSURETHRAL RESECTION BLADDER TUMOR, SMALL performed by Bernard Helton MD at [address]      left [place] - Dr Dakotah Dewey      neck and scalp melanoma, cheek basal cell    SKIN CANCER EXCISION      left [place] - Dr Juwan Johnson         Restrictions/Precautions:  Fall Risk, General Precautions         Other position/activity restrictions: Protective helmet on when up. has a shower chair, grab bars in BR and shower. Safety:  Safety Devices in place: Yes  Type of devices: All fall risk precautions in place, Call light within reach, Gait belt, Patient at risk for falls, Left in chair, Chair alarm in place    Plan:  Times per week: 5xs week x 90 min, 1 x week x 30 min  Current Treatment Recommendations: Strengthening, Balance Training, Endurance Training, Neuromuscular Re-education, Patient/Caregiver Education & Training, Self-Care / ADL, Safety Education & Training, Functional Mobility Training, Home Management Training, Equipment Evaluation, Education, & procurement, Cognitive/Perceptual Training    Goals:  Patient goals : spouse reporting she wants pt to get stronger and eventually return home after rehab. Pt stated \"yeah\"    Short term goals  Time Frame for Short term goals: 1 week  Short term goal 1: Pt will initiate ADL routine with no > mod cues for attention to task and continueation of task. to increase initiation into daily activities.    Short term goal 2: PT will complete functional ambulation to and from the BR with no > min A x 1 and RW with no > Mod cues for R LE placement and awareness to increase ability tc complete toileting routine  Short term goal 3: Pt will incorporate R hand into daily dressing tasks including opening the hand to grasp for items with no > mod cues for increased independence in grooming tasksq  Short term goal 4: Pt to demo dynamic standing balance > 2 min with 0-1 UE support when reaching outside base of support with CGA in prep for completing clothing management after toileting and sinkside ADL tasks   Long term goals  Time Frame for Long term goals : 4-5 weeks  Long term goal 1: Pt will complete ADL routine with no > cues for initiation to task and problem solving tasks to increase independence in self care tasks  Long term goal 2: PT will complete functional ambulation to and from the BR as well as around obstacles with SBA and no > min cues for attention to safe mobiltiy to increase independence in self care tasks  Long term goal 3: Pt will follow 1 step commands for basic homemaking tasks with no > min cues for attention to task to increase independence in retrieving a snack. If patient is discharged prior to progress note completion, this note is to serve as the discharge note with all goals being unmet unless indicated otherwise.

## 2019-03-28 NOTE — PROGRESS NOTES
César Barnes  Modified Barium Swallow    SLP Individual Minutes  Time In: 1618  Time Out: 1150  Minutes: 10  Timed Code Treatment Minutes: 0 Minutes       [] [facility]   [] Acute Care [] Transitional Care Unit [x] IP Rehab    Date: 3/28/2019  Patient Name: Montey Goodell      CSN: [id number]   : 1931  (80 y.o.)  Gender: male   Referring Physician: Dr. Chloe Barba   Diagnosis: Subdural hematoma d/t concussion without LOC  Secondary Diagnosis: Dysphagia   History of Present Illness/Injury: Pt admit with above dx. Please refer to H&P for full pt medical hx. Questionable s/s of aspiration noted at bedside with PO intake. ST recommendations for MBS to r/o pharyngeal dysfunction and determine appropriate dietary recommendations. has a past medical history of Ankle fracture, Arthritis, BPH (benign prostatic hyperplasia), CAD (coronary artery disease), Cancer of bladder, neck (HCC), Cervical vertebra fusion, Colon polyps, Foot drop, left, GERD (gastroesophageal reflux disease), Hyperlipemia, Macular degeneration, Melanoma ([address] RISHABH on CPAP, Parkinson disease ([address] Presbycusis of both ears, uses hearing aids, Spinal stenosis, Subdural hematoma ([address] Syncope, and Traumatic subdural hemorrhage without loss of consciousness ([address]   Pain: None reported **pt highly fidgety throughout the assessment (I.e. Pulling off blanket, adjusting legs and feet, etc)    Current Diet: Dysphagia I and nectar thick liquids by spoon only     Respiratory Status: [] Independent [x] Nasal Cannula [] Oxygen Mask      [] Tracheostomy [] Other:     [] Ventilator/Settings:    Behavioral Observation: [x] Alert   [x] Confused [x] Lethargic      [x] Dysarthric [x] Limited Direction Following [x] Other: poor level of sustained attention and functional cognition being barrier towards successful PO trials during MBS    PATIENT WAS EVALUATED USING:  Rayo x1, thin liquids by spoon x1    ORAL PREPARATION PHASE: [] WFL  [x] Impaired   [x] No attempts to formulate bolus with severe oral holding and spillage of liquids with trials of thin. Anterior spillage of pureed consistencies given severe lack of initiation. ORAL PHASE: [] [facility]  [x] Impaired   [x] Decreased bolus control given lack of initiation. Swallow function severely impacted by current level of cognitive functioning    ORAL PHASE IDRIS SCORE: (Dysphagia outcome and severity scale)  [x] 1 = Severe dysphagia; NPO; Unable to tolerate any po safely; Severe oral loss of bolus or oral residue; Non-oral nutrition. PHARYNGEAL PHASE: [] WFL  [x] Impaired   [x] Absent Swallow under fluoroscopy. Pt did initiate a swallow after  10+ second delay      PHARYNGEAL PHASE IDRIS SCORE: (Dysphagia outcome and severity scale)  [x] 1 = Severe dysphagia:  NPO; Unable to tolerate any po safely; Severe residue unable to clear; Silent aspiration with two or more consistencies, non-functional cough;  OR Unable to achieve a swallow.     SIGNS AND SYMPTOMS OF LARYNGEAL PENETRATION / ASPIRATION:  [x] No evidence of laryngeal penetration  [x] No evidence of aspiration  **unable to visualize during the swallow as pt eliciting swallow trigger without fluoroscopy; however, no residuals noted in airway with resumption of fluoroscopy     PENETRATION-ASPIRATION SCALE (PAS):  [x] 1 = Material does not enter the airway    ESOPHAGEAL PHASE:   [x] No significant findings  [x] See Radiology Report for details  [] Recommend further esophageal testing    ATTEMPTED TECHNIQUES:      Comments:  [x]Small bolus size [] Effective [x] Not Effective    [x]Straw [] Effective [] Not Effective **unable to elicit given current cognitive functioning   [x]Cup [] Effective [] Not Effective **unable to elicit given current cognitive functioning   []Chin tuck [] Effective [] Not Effective    []Head Turn [] Effective [] Not Effective    []Spoon presentations [] Effective [] weeks  Goal 1: Pt will safely tolerate dysphagia I diet with thin liquids 98% of the time given compensatory swallowing strategies to ensure safe and adequate nutrition/hydration measures. Goal 2: Pt will improve expressive and receptive language skills to a mild impairment level or higher to permit improved ability to improve functional communication for wants/needs.           April Evans M.S. Genoveva Posada 3/28/2019

## 2019-03-29 NOTE — PROGRESS NOTES
[address] [place] [address]                                 PROGRESS NOTE    PATIENT NAME: Nitza Rodriguez                        :        1931  MED REC NO:   [id number]                           ROOM:       3567  ACCOUNT NO:   [de-identified]                           ADMIT DATE: 2019  PROVIDER:     Emmanuel Andujar. Cody Ayoub M.D. NEUROSURGERY PROGRESS NOTE    DATE OF SERVICE:  2019. TIME OF SERVICE:  05:45 a.m. HISTORY:  The patient is on the rehab floor. He has a sitter at the  bedside, because of agitation. This is the patient's 11th postoperative  day. The craniectomy incision is healing well. The staples appear  intact as well as the drain site stitches. There is no drainage. The  patient will open his eyes to voice. He follows a few simple commands. He moves all four extremities purposefully. He has a paucity of speech. He has a helmet to wear when he is out of bed even though he lays on the  left side of his head in bed. I think that is okay without the helmet  as long as he uses his pillow. The staples and sutures can come out at  postoperative day #14. I ordered a CT scan for next Tuesday, which will  be his postoperative day #16. ASSESSMENT:  Subdural hematoma, status post evacuation and now with  acquired skull defect. He also has history of Parkinson's disease and  he has metabolic encephalopathy.         Pastor Paulino M.D.    D: 2019 3:36:32       T: 2019 4:57:53     [email or web address]  Job#: [id number]     Doc#: [id number]    CC:

## 2019-03-29 NOTE — PLAN OF CARE
Problem: Nutrition  Goal: Optimal nutrition therapy  Outcome: Ongoing   Nutrition Problem: Inadequate oral intake  Intervention: Food and/or Nutrient Delivery: Continue NPO, Start Parenteral Nutrition(NPO per SLP.)  Nutritional Goals: Pt will tolerate adequate nutrition support to meet 75% or more of estimated nutritional needs during LOS until appropriate to transition to PO feeds

## 2019-03-29 NOTE — PROGRESS NOTES
Physical Medicine & Rehabilitation  Progress Note    Chief Complaint:  LEFT frontal subdural hemorrhage s/p craniectomy/evacuation    Subjective:  Did sleep last night and is more alert today; but per SLP still not safe for resumption of a diet. PPN will be started after discussion with spouse. Head CT planned for next week was done early with noted interval improvement; results shared with spouse. Later in afternoon he spiked a temperature of 101.2 rectally. No other specific concerns. Rehabilitation:  Physical therapy: FIMS:  Bed Mobility: Scooting: Minimal assistance    Transfers: Sit to Stand: Moderate Assistance, 2 Person Assistance(Mod A x1, Min A x1, Max verbal and tactile cueing for hand placement, no recall from previous sessions, mod to min lean to R, lacking TKE when standing with heavy fwd flexed trunk)  Stand to sit: Moderate Assistance, Contact guard assistance(Mod A x1, CGA x1, poor carry over/processing instructions, very slow to sit, MAx verbal and contact cueing required.), Ambulation 1  Surface: level tile  Device: Rolling Walker  Other Apparatus: O2  Assistance: Minimal assistance, 2 Person assistance(increased unsteadiness)  Quality of Gait: slow rodo, mod path deviations, cues for AD safety and use, min lat lean to right, decreased MELCHOR TKE, min to mod fwd flexed posture, mod foot clearance, required min guidance with AD  Distance: 15ft x2  Comments: increased lean to R noted during second bout.  Pt requiring min A x1 at trunk d/t leaning too far forward and quick pace, Min A to CGA x1,      FIMS: Bed, Chair, Wheel Chair: 2 - Requires 50-74% assistance to transfer  Walk: 1 - Total Assistance Walks < 50 feet OR requires two or more people OR patient performs < 25% of locomotion effort  Distance Walked: 45 feet x 2 , PT Equipment Recommendations  Equipment Needed: Yes(Continue to assess (erlinda CAMPBELL)), Assessment: Patient tolerated session fair, Continues with increased difficulty in understanding and completing exercises. No recall or follow thru demo'd with transfers. Would benefit from additional skilled PT to increase strength, endurance, balance and safety for improved functional mobility. Occupational therapy: FIMS:  Eatin - Staff performs feeding for patient or patient requires IV fluids for hydration/TPN/PPN  Groomin - Able to perform 1-2 tasks (max assist)(physical assist to lift R UE to face then pt able to assist with washing face. min A for washing hands.)  Bathin - Able to bathe 2 or less areas/total assist(max A for hand over hand and initiation to wash each body part. Assit to wash B underarms, B LEs and bottom. second person needed for standing to wash bottom. max cues for continuation of task. mod increased time needed for processing speed throughout. )  Dressing-Upper: 2 - Requires assist with 3 tasks(Hand over hand for pt to wash UEs, PT intermittently washed chest and ABD. max cues for intiation, problem solving, attention with increased time needed for task. dep for LEs and Bottom when standing. sponge bath completed. )  Dressing-Lower: 2 - Requires assist with 4-5 parts of dressing(assist for pants over feet, socks and shoes. PT did follow directions to lift feet to don socks and shoes. mod A for pullling pants up.)  Toiletin - Able to perform 1 task only (e.g. hygiene)(max A for clothing mgt up and down. CGA to close SBA For balance seated on the toilet.)  Toilet Transfer: 2 - Requires 50-74% assist getting off toilet(min A stand to sit. mod A sit stand from a std height toilet and physical assist for placing R hand on grab bar then pt able to pull self up with mod A.),  , Assessment: Pt has not met any goals due to only being seen for an evaluaiton and 1 treatment on the inpt rehab unit. Pt demo deficits in right UE and need for increased assistance during ADL task.  He currntly needs max cueing for initiation and problem solving as well as processing speed for basic care tasks. . Pt demo decreased standing balance during ADL tasks. Pt would benefit from skilled OT Services to increase his endurance for ADL tasks and mobility to eventual return home. Speech therapy: FIMS: Comprehension: 1 - Patient understands basic needs <25% of the time  Expression: 1 - Expresses basic ideas/needs < 25% of the time  Social Interaction: 1 - Patient appropriate < 25% of the time  Problem Solvin - Patient solves simple/routine tasks < 25%  Memory: 1 - Patient remembers < 25% of the time    IMPRESSIONS: Pt presents with at least moderate-severe oropharyngeal dysphagia based on findings outlined above. Oral phase significantly prolonged with soft textures with minimal attempts at mastication; immature chewing pattern via munching, impacting effective bolus breakdown and control, resulting in immediate cough with subsequent throat clears to follow swallow trigger. Presented trial of pudding with improved oral phase detected, however, slight bolus holding with min cues needed to elicit swallow initiation. [place] thick juice via up with throat clears x2 for x4 oz consumed with production of spontaneous, multiple swallows correlating to some degree of pharyngeal residue. Recommend CONSERVATIVE diet establishment of dysphagia I with nectar thick liquids at this time with formal instrumentation via MBS to be completed on 3/27/19 to further assess pharyngeal integrity, establish safest PO diet level, and develop dysphagia POC as indicated.      Additionally, pt presents with severe-profound expressive and receptive language deficits. Unable to establish consistent yes/no response format to convey BASIC wants/needs within relevant context given deferral to verbal responses of \"ok\" and \"alright\". 1-step commands NOT able to be executed despite implementation of visual model and provision of max cues.  Confrontational naming of core objects diminished with pt unable to identify objects for functional usage (e.g., pen, comb). Cognitive measures unable to be assessed at this time; O-log initiated with current score of 0/30, compounded due to current severity of deficits exhibited. Suspect some degree of dysarthria; will need to monitor within subsequent therapy sessions to ascertain need for further goal development given minimal verbal output at date.  HIGHLY recommend intensive ST services to address linguistic domains to improve effective communication within current and future settings for wants/needs.     Review of Systems:  CONSTITUTIONAL:  positive for fatigue  EYES:  positive for  glasses  HEENT:  Positive for hearing loss  RESPIRATORY:  positive for NC  CARDIOVASCULAR:  negative  GASTROINTESTINAL:  Positive for incontinence  GENITOURINARY:  positive for urinary incontinence  SKIN:  Positive for bruising  HEMATOLOGIC/LYMPHATIC:  negative  MUSCULOSKELETAL:  positive for  muscle weakness  NEUROLOGICAL:  positive for memory problems, gait problems, dysphagia and weakness  BEHAVIOR/PSYCH:  negative  10 point system review otherwise negative    Objective:  BP (!) 133/58   Pulse 65   Temp 101.2 °F (38.4 °C) (Rectal)   Resp 18   Ht 5' 10\" (1.778 m)   Wt 123 lb 8 oz (56 kg)   SpO2 97%   BMI 17.72 kg/m²     sleepy  Orientation:   Person, unable to establish location  Mood: dysthymic  Affect: flat  General appearance:  in no acute distress, glasses and [email or web address], hearing aids, lying in bed     Memory:   abnormal - unable to remember his fall  Attention/Concentration: abnormal - slowed responses; good one step command following  Language:  abnormal - expressive aphasia and dysarthria     Cranial Nerves:  cranial nerves II-XII are grossly intact  With exception of decreased hearing acuity  ROM:  normal  Motor Exam:  Motor exam is 4 out of 5 all extremities with the exception of LEFT foot drop 2/5     Coordination:   Normal for FTN and JES  Deep Tendon Reflexes:  Reflexes are intact and symmetrical bilaterally     Skin: warm and dry, no rash or erythema and ecchymoses noted on LEFT forehead, orbit and brow, sunken skull defect consistent with surgery  Peripheral vascular: Pulses: Normal upper and lower extremity pulses; Edema: trace    Diagnostics:   Recent Results (from the past 24 hour(s))   POCT glucose    Collection Time: 03/29/19  3:26 AM   Result Value Ref Range    POC Glucose 115 (H) 70 - 108 mg/dl   Basic Metabolic Panel    Collection Time: 03/29/19  4:42 AM   Result Value Ref Range    Sodium 137 135 - 145 meq/L    Potassium 3.3 (L) 3.5 - 5.2 meq/L    Chloride 97 (L) 98 - 111 meq/L    CO2 27 23 - 33 meq/L    Glucose 104 70 - 108 mg/dL    BUN 15 7 - 22 mg/dL    CREATININE 0.5 0.4 - 1.2 mg/dL    Calcium 8.3 (L) 8.5 - 10.5 mg/dL   Anion Gap    Collection Time: 03/29/19  4:42 AM   Result Value Ref Range    Anion Gap 13.0 8.0 - 16.0 meq/L   Glomerular Filtration Rate, Estimated    Collection Time: 03/29/19  4:42 AM   Result Value Ref Range    Est, Glom Filt Rate >90 ml/min/1.73m2   Phosphorus    Collection Time: 03/29/19  4:42 AM   Result Value Ref Range    Phosphorus 3.3 2.4 - 4.7 mg/dL   Magnesium    Collection Time: 03/29/19  4:42 AM   Result Value Ref Range    Magnesium 2.0 1.6 - 2.4 mg/dL   CBC auto differential    Collection Time: 03/29/19  6:03 PM   Result Value Ref Range    WBC 15.5 (H) 4.8 - 10.8 thou/mm3    RBC 3.66 (L) 4.70 - 6.10 mill/mm3    Hemoglobin 11.3 (L) 14.0 - 18.0 gm/dl    Hematocrit 34.4 (L) 42.0 - 52.0 %    MCV 94.0 80.0 - 94.0 fL    MCH 30.9 26.0 - 33.0 pg    MCHC 32.8 32.2 - 35.5 gm/dl    RDW-CV 13.7 11.5 - 14.5 %    RDW-SD 47.0 (H) 35.0 - 45.0 fL    Platelets 757 651 - 041 thou/mm3    MPV 9.6 9.4 - 12.4 fL    Seg Neutrophils 88.2 %    Lymphocytes 5.4 %    Monocytes 4.7 %    Eosinophils 0.8 %    Basophils 0.5 %    Immature Granulocytes 0.4 %    Segs Absolute 13.7 (H) 1.8 - 7.7 thou/mm3    Lymphocytes # 0.8 (L) 1.0 - 4.8 thou/mm3    Monocytes # 0.7 0.4 - 1.3 thou/mm3 Eosinophils # 0.1 0.0 - 0.4 thou/mm3    Basophils # 0.1 0.0 - 0.1 thou/mm3    Immature Grans (Abs) 0.06 0.00 - 0.07 thou/mm3    nRBC 0 /100 wbc   Procalcitonin    Collection Time: 03/29/19  6:03 PM   Result Value Ref Range    Procalcitonin 0.07 0.01 - 0.09 ng/mL   Lactic acid, plasma    Collection Time: 03/29/19  6:03 PM   Result Value Ref Range    Lactic Acid 0.8 0.5 - 2.2 mmol/L   POCT glucose    Collection Time: 03/29/19  6:06 PM   Result Value Ref Range    POC Glucose 113 (H) 70 - 108 mg/dl   Urine with Reflexed Micro    Collection Time: 03/29/19  6:45 PM   Result Value Ref Range    Glucose, Ur NEGATIVE NEGATIVE mg/dl    Bilirubin Urine NEGATIVE NEGATIVE    Ketones, Urine NEGATIVE NEGATIVE    Specific Gravity, Urine 1.018 1.002 - 1.03    Blood, Urine MODERATE (A) NEGATIVE    pH, UA 7.5 5.0 - 9.0    Protein, UA 30 (A) NEGATIVE    Urobilinogen, Urine 1.0 0.0 - 1.0 eu/dl    Nitrite, Urine POSITIVE (A) NEGATIVE    Leukocyte Esterase, Urine LARGE (A) NEGATIVE    Color, UA YELLOW STRAW-YELL    Character, Urine TURBID (A) CLEAR-SL C    RBC, UA 15-25 0-2/hpf /hpf    WBC, UA > 200 0-4/hpf /hpf    Epi Cells NONE SEEN 3-5/hpf /hpf    Bacteria, UA MANY FEW/NONE S /hpf    Casts UA 4-8 HYALINE NONE SEEN /lpf    Crystals NONE SEEN NONE SEEN    Renal Epithelial, Urine NONE SEEN NONE SEEN    Yeast, UA NONE SEEN NONE SEEN    CASTS 2 NONE SEEN NONE SEEN /lpf    MISCELLANEOUS 2 NONE SEEN      Ct Head Wo Contrast    Result Date: 3/29/2019  PROCEDURE: CT HEAD WO CONTRAST CLINICAL INFORMATION: subdural hematoma; skull defect. Status post left-sided craniectomy. Confusion. COMPARISON: Head CT 3/21/2019 and 3/17/2019. TECHNIQUE: Noncontrast 5 mm axial images were obtained through the brain. Sagittal and coronal reconstructions were obtained. All CT scans at this facility use dose modulation, iterative reconstruction, and/or weight-based dosing when appropriate to reduce radiation dose to as low as reasonably achievable.  FINDINGS: Motion artifact does degrade the quality of some of these images. These are the best images possible at this time. There is a small amount of residual high attenuation in the anterior inferior right frontal lobe consistent with a small hemorrhagic contusion. This is best seen on the coronal reconstructions. There are no other areas of parenchymal hemorrhage. There is a small amount of low attenuation subdural fluid on the left deep to the craniectomy defect. There is no associated mass effect. There is no hydrocephalus, midline shift or mass effect. There is a moderate amount of patchy abnormal low attenuation in the white matter of the brain suggesting chronic small vessel ischemic changes. The paranasal sinuses and mastoid air cells are normally aerated. There is no suspicious calvarial abnormality. 1. Continued improvement in the hemorrhagic contusion in the inferior right frontal lobe. 2. Left frontal craniectomy defect. Deep to this, there is a small amount of low attenuation subdural fluid without mass effect on the underlying brain. **This report has been created using voice recognition software. It may contain minor errors which are inherent in voice recognition technology. ** Final report electronically signed by Dr. Estephania Palomo on 3/29/2019 3:09 PM    Xr Chest Portable    Result Date: 3/29/2019  PROCEDURE: XR CHEST PORTABLE CLINICAL INFORMATION: febrile. COMPARISON: Chest x-ray dated 6/14/2019 TECHNIQUE: AP Portable upright chest xray FINDINGS: Lungs/pleura:  No pneumonia, pulmonary edema, or obvious mass. There is mid and lower right lung atelectasis. No pleural effusion. No pneumothorax. Heart: Heart size is upper normal. Mediastinum/arturo: No obvious mass or adenopathy. Skeleton: There is right glenohumeral joint DJD and mid thoracic degenerative disc disease. The patient is status post lower cervical anterior fusion with a plate and screws.  Lines/tubes/devices: A loop recorder projects over the mid left chest wall. No acute pneumonia. Mild mid and lower right lung atelectasis. **This report has been created using voice recognition software. It may contain minor errors which are inherent in voice recognition technology. ** Final report electronically signed by Dr. Sherif Guzman on 3/29/2019 6:59 PM    Fl Modified Barium Swallow W Video    Addendum Date: 3/28/2019    ** ADDENDUM #1 ** The entire report on this modified is incorrect. The correct report should be as follows: Study done by speech therapy with the radiology available Fluoroscopy time: 44 seconds Images: 11 FINDINGS: There is no laryngeal penetration or aspiration with any tested barium consistency. Result Date: 3/28/2019  PROCEDURE: FL MODIFIED BARIUM SWALLOW W VIDEO CLINICAL INFORMATION: Further assess pharyngeal integrity to develop dysphagia POC . COMPARISON: No prior study. TECHNIQUE: Fluoroscopy was provided to speech therapy to evaluate the patient's swallowing mechanism. Imaging was done the lateral projection only. Barium of various consistencies was administered to the patient. Radiologist was available for the examination. Fluoroscopic time: 44 seconds Images: 11 FINDINGS: There was aspiration with nectar thick consistency only when using a straw, no aspiration occurred when using a cup. There was silent aspiration with thin liquids. Aspiration with nectar thick and thin consistencies. For recommendations and additional comments please refer to the speech therapy report **This report has been created using voice recognition software. It may contain minor errors which are inherent in voice recognition technology. ** Final report electronically signed by Dr. Rainer Mcneil on 3/28/2019 1:41 PM    Impression:  1. 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   2. LEFT subdural hemorrhage with marked mass effect and 14 mm of midline shift toward the right s/p LEFT  frontotemporoparietal craniectomy with evacuation of LEFT pan hemispheric acute subdural hematoma by Dr. Atif Sunshine on 3/17/2019.  3. Severe-profound expressive and receptive language deficits. 4. Moderate-severe oropharyngeal dysphagia  5. Gait instability. 6. Parkinson Disease dignosed in 2016.  7. Chronic LEFT foot drop. 8. Hypoxia. 9. Post operative anemia with Hgb of 13.3 on 3/17 with decrease to 10.2 on 3/18 s/p two units of platelets. 10. History of prior subarachnoid hemhorrage in 2015 s/p fall with LOC. 11. CAD. 12. HTN. 13. HLD. 14. BPH. 15. History of bladder neck cancer s/p surgical resection 8/3/2018 and 2011. 12. History of melanoma s/p resection involving face and scalp. 17. History of Cervical fusion in 2009. 18. Macular degeneration. 19. Presbycusis with use of hearing aids. 20. Sleep apnea on CPAP. 21. Hypovitaminosis D.  22. Circadian rhythm disturbance. 23. Leukocytosis/LEFT shift with fever and UA with positive nitrite, Large LE, >200 WBC, and many bacteria. Plan:   Medical management: Per Dr. Abdirashid Matute.     Consultants:  Neurosurgery, Trauma Surgery, Critical Care, Family Medicine, Physical Medicine     Narcotic usage:  N/A  Last BM:  Stool Amount: Smear (03/27/19 1436)  Ambulation/Mobility:  Quality of Gait: slow rodo, mod path deviations, cues for AD safety and use, min lat lean to right, decreased MELCHOR TKE, min to mod fwd flexed posture, mod foot clearance, required min guidance with AD  Distance: 15ft x2  Quality of Gait 2: slow, increased difficulty processing task, Max Vcs for safety and foot placement, fwd flexed posture  Distance: 6ft x2, 4ft x2       Acute/Rehabilitation Problems:  1. Fall with TBI without LOC/LEFT subdural hemorrhage/s/p LEFT  frontotemporoparietal craniectomy with evacuation. 1. Surgery 3/17/2019.  2. Repeat CT head ordered on 3/29 for change in performance with therapies. 1. Continued improvement in the hemorrhagic contusion in the inferior right frontal lobe; left frontal craniectomy defect.  Deep to this, there is a small amount of low attenuation subdural fluid without mass effect on the underlying brain. 3. Next image 4/2.  4. Wound care. 1. Staples and sutures out 3/31.  5. Bright light therapy initiated. 6. TBI precautions in place. 7. Helmet on when out of bed. 1. Helmet fit was adjusted 3/26 by [facility]. 8. Can sleep on LEFT side of head with use of a soft pillow. 9. Circadian rhythm disturbance. 1. Trazodone 75mg on 3/27. Dose was missed; dosing prioritized to make sure he gets this dose on 3/28. Worked well. 2. Will use benadryl 25mg while he is NPO.  2. Severe-profound expressive and receptive language deficits/Moderate-severe oropharyngeal dysphagia. 1. MBS 3/27 was canceled due to lethargy. Rescheduled and only partially completed due to lethargy from not having slept. 2. SLP. 3. Dietary Nutrition Supplements: Frozen Oral Supplement  3. DIET DYSPHAGIA I PUREED; Nectar Thick; No Drinking Straw. 4. Made NPO on 3/28 due to Lethargy and D5W was started for 12 hours rate 50mL per hour. Check blood sugars every 6 hours; hypoglycemia protocol in place. PPN started on 3/29 to bridge weekend and then re-evaluate with SLP for potential resumption of oral feeds. 3. Gait instability/Parkinson Disease dignosed in 2016/Chronic LEFT foot drop. 1. PT/OT. 2. Sinemet and Requip at 5 hour dosing interval per discussion with spouse. 4. Hypoxia. 1. Supplemental O2 with orders to wean as able. 2. O2 decreased to 2.0L/min. 5. Leukocytosis/LEFT shift with fever. 1. UA with positive nitrite, Large LE, >200 WBC, and many bacteria. 1. Unasyn ordered. 2. CXR negative for PNA. 3. WBC 15.5 with 13.7 absolute segs. 4. Procalcitonin 0.07.  5. Blood Cx x 2 drawn. 6. Neurology consult placed as patient is at risk for NMS with abrupt withdrawal of Sinemet due to NPO status on 3/29. 6. Post operative anemia. 1. Hgb of 13.3 on 3/17 with decrease to 10.2 on 3/18 s/p two units of platelets.   2. Hgb improved to 11.3 on 3/29.  7. Nutrition:  Consultation to dietician for nutritional counseling and recommendations. 1. TotP 5.5, alb 2.8, Vitamin 25OH level of 27 on 3/26/2019.  2. Cholecalciferol 1000IU daily. 8. Electrolytes. 1. Normal on 3/26 with exception of:  1. Hypokalemia at 3.0 on 3/26. 2. Repleted with lab on 3/27 of 3.5. Normal.  9. Bladder: Home Proscar and Flomax. 1. 24 hour bladder scans with [facility] for volumes over 300mL. 2. PVR of 59.  10. Bowel: Senna, colace, MOM  11. Rehabilitation nursing will be involved for bowel, bladder, skin, and pain management. Nursing will also provide education and training to patient and family. 12. Prophylaxis:  DVT: SCDs. GI: Pepcid. 13.  and case management consultations for coordination of care and discharge planning.     Chronic Problems:  1. CAD/HTN/HLD. 1. Norvasc. 2. Lipitor. 2. BPH/History of bladder neck cancer s/p surgical resection 8/3/2018 and 2011.  1. Flomax. 2. Proscar restarted 3/26. 3. Bladder scans/[facility] as he has some retention after admission with volumes close to 300mL. 3. Macular degeneration. 4. Presbycusis with use of hearing aids. 5. Sleep apnea on CPAP. 1. Unable to use due to bone flap being out.     Labs:  1. 3/26.  2. 3/27.     Infectious Disease:  1.  N/A    Missed Therapy Time:  3/28 missed 10 minutes of OT due to lethargy from not sleeping the night before   3/29 missed 30 minutes of PT while down for stat head CT.    DME:    Discharge Plan:    John Szymanski MD

## 2019-03-29 NOTE — PROGRESS NOTES
[address] THERAPY  [facility] 53   SLP Individual Minutes  Time In: 4042  Time Out: 1100  Minutes: 30  Timed Code Treatment Minutes: 0 Minutes   DYSPHAGIA THERAPY: 2083-4730  LINGUISTIC THERAPY: 6152-2583       [x]Daily Note  []Progress Note  []Discharge Note    Date: 3/29/2019  Patient Name: Virginia Uribe        MRN: [id number]    : 1931  (80 y.o.)  Gender: male   Primary Provider: Silvio Diallo MD  Admitting Diagnosis: Subdural hematoma d/t concussion without LOC  Secondary Diagnosis: Dysphagia; cognitive-linguistic deficits   Precautions: Fall risk, aspiration precautions   Swallowing Status/Diet: NPO  Swallowing Strategies: Strict oral care protocol every 2 hours   DATE of last MBS: 3/28  Pain: No pain evidenced this date    Subjective: Pt seen upright in recliner with wife, Abdoulaye Marie and family friend present. ST completion of PO trials to determine appropriateness for BSE this date. Pt with slightly improved level of alertness; however, continued lethargy noted. SHORT TERM GOAL #1:  Goal 1: Complete repeat BSE as appropriate and determine appropriateness to initiate PO diet and/or pt appropriateness for instrumental assessment. INTERVENTIONS: PO trials of applesauce x2. Pt with no attempts to grab spoon when presented with utensil by ST. ST manipulating pt's hand to further maximize functional self feeding; however, pt again with no awareness/initiation d/t severe cognitive decline. ST required to provide feeding assistance this date. Pt with improved elicitation of labial rounding. Decreased strength with impaired extraction of bolus off of spoon. Pt with decreased bolus formation with spillage of bolus to anterior sulcus resulting in mod stasis. Initiation of oral bolus manipulation followed by oral holding with pt need for MAX cues to elicit a swallow response. Elicitation of pharyngeal swallow following 5+ seconds.  Concerns for decreased laryngeal elevation and decreased pharyngeal constriction with suspected high levels of pharyngeal residuals. Certainly cannot determine without instrumental assessment. Pt with presence of coughing to follow. Recommendation to remain NPO with consideration for alternative means of nutrition and strict oral care protocol. Completion of rigorous oral care completed with suctioning/yankers set up completed by nursing. Spoke with RN, Amada Regan and wife, Patt Maguire regarding ST recommendations for oral care every 2 hours to maximize oral moistening and reduce risk for bacteria colonization. Skilled level education/demonstration regarding how to provide oral care completed with wife (I.e. Equal parts hydrogen peroxide and water, scrubbing of upper/lower gums, lingual surface and buccal cavities as well as use of suctioning to improve clearance of liquid from oral cavity). Wife verbalized understanding. SHORT TERM GOAL #2:  Goal 2: Pt will follow basic 1-step commands and answer yes/no questions with 50% accuracy given max cues to improve ability to convey wants/needs. INTERVENTIONS: DNT d/t focus on other goals     SHORT TERM GOAL #3:  Goal 3: Pt will complete automatic speech, repetition, and confrontational naming tasks with 50% accuracy given max cues to improve verbal expression. INTERVENTIONS: Stating name: unable to elicit despite max cues. No vocal attempts. Counting 1-10: no attempts made by pt despite multiple trials and max cues provided   Singing Happy Birthday: no attempts made by pt despite multiple trials and max cues provided  Repetition of single syllable /ma/: unable to elicit with no pt attempts despite multiple repetitions and max cues    SHORT TERM GOAL #4:  Goal 4: Pt will ID objects/pictures in F02 with 50% accuracy given max cues to permit potential introduction of alternative communication needs.    INTERVENTIONS: DNT d/t focus on other goals     SHORT TERM GOAL #5:  Goal 5: Pt will complete BASIC orientation tasks with 50% accuracy given max cues to improve awareness to immediate surroundings. INTERVENTIONS: DNT d/t focus on other goals    Long-term Goals  Timeframe for Long-term Goals: 6 weeks  Goal 1: Pt will safely tolerate dysphagia I diet with thin liquids 98% of the time given compensatory swallowing strategies to ensure safe and adequate nutrition/hydration measures. Goal 2: Pt will improve expressive and receptive language skills to a mild impairment level or higher to permit improved ability to improve functional communication for wants/needs.       COMMUNICATION  Comprehension: 1 - Patient understands basic needs <25% of the time  Expression: 1 - Expresses basic ideas/needs < 25% of the time  SOCIAL COGNITION  Social Interaction: 1 - Patient appropriate < 25% of the time  Problem Solvin - Patient solves simple/routine tasks < 25%  Memory: 1 - Patient remembers < 25% of the time    ASSESSMENT:  Assessment: []Progressing towards goals          [x]Not Progressing towards goals       Patient Tolerance of Treatment:   []Tolerated well [x]Tolerated fair []Required rest breaks [x]Fatigued  Plan for Next Session: PO trials of applesauce, linguistic goal targets, oral care  Education:  Learner:  [x]Patient          [x]Significant Other          [x]Other- RN, Sharmila Mccormack, Dr. Figueroa Hayes   Education provided regarding:  [x]Goals and POC   [x]Diet and swallowing precautions    []Home Exercise Program  [x]Progress and/or discharge information  Method of Education:  [x]Discussion          [x]Demonstration          []Handout          []Other  Evaluation of Education:   [x]Verbalized understanding   []Demonstrates without assistance  []Demonstrates with assistance  [x]Needs further instruction     [x]No evidence of learning                  []No family present- pt      Plan: [x]Continue with current plan of care    []Modify current plan of care as follows:    []Discharge patient    Discharge Location:    Services/Supervision Recommended:     [x]Patient continues to require treatment by a licensed therapist to address functional deficits as outlined in the established plan of care.     April Evans M.S. Genoveva Posada 3/29/2019

## 2019-03-29 NOTE — PROGRESS NOTES
position/activity restrictions: Protective helmet on when up. OK to remove in supine only. Prior Level of Function:  ADL Assistance: Independent  Homemaking Assistance: Independent  Ambulation Assistance: Independent  Transfer Assistance: Independent  Additional Comments: Per spouse, pt was very indep with all ADL tasks at home. Pt completed all the driving at this time d/t spouse health problems. Pt did use cane in community. Pt.'s wife stated that his Parkinsons has caused him to have L sided arm and jaw tremors and she feels that it is currently effecting is recall and has difficulty with word finding. Subjective:  Chart Reviewed: Yes  Family / Caregiver Present: Yes  Subjective: Pt in [facility] upon therapy arrival, alert to verbal stimulation, unable to understand pt d/t garbled speech. Agreeable to therapy with head nod. Pain:  Denies. Social/Functional:  Lives With: Spouse  Type of Home: House  Home Layout: One level  Home Access: Stairs to enter with rails  Entrance Stairs - Number of Steps: 3  Entrance Stairs - Rails: Left  Home Equipment: Cane     Objective:  Sit to Supine: Moderate assistance(Mod A with LE, Min A with trunk. increased difficulty following instructions)  Scooting: Minimal assistance    Transfers  Sit to Stand:  Moderate Assistance;2 Person Assistance(Mod A x1, Min A x1, Max verbal and tactile cueing for hand placement, no recall from previous sessions, mod to min lean to R, lacking TKE when standing with heavy fwd flexed trunk)  Stand to sit: Moderate Assistance;Contact guard assistance(Mod A x1, CGA x1, poor carry over/processing instructions, very slow to sit, MAx verbal and contact cueing required.)       Ambulation 1  Surface: level tile  Device: Rolling Walker  Other Apparatus: O2  Assistance: Minimal assistance;2 Person assistance(increased unsteadiness)  Quality of Gait: slow rodo, mod path deviations, cues for AD safety and use, min lat lean to right, decreased Recommendations:  Discharge Recommendations: Continue to assess pending progress    Patient Education:  Patient Education: POC, tfers, gait, ther ex  Barriers to Learning: cognition    Equipment Recommendations:  Equipment Needed: Yes(Continue to assess (possbile RW))    Safety:  Type of devices: All fall risk precautions in place, Patient at risk for falls, Left in bed, Bed alarm in place, Nurse notified, Sitter present, Gait belt  Restraints  Initially in place: No    Plan:  Times per week: 5x/ week 90 minutes; 1x/week 30 minutes  Times per day: Daily  Current Treatment Recommendations: Strengthening, Transfer Training, Endurance Training, Neuromuscular Re-education, Patient/Caregiver Education & Training, ROM, ADL/Self-care Training, Balance Training, Gait Training, Home Exercise Program, Functional Mobility Training, Stair training, Safety Education & Training, Equipment Evaluation, Education, & procurement    Goals:       Short term goals  Time Frame for Short term goals: 1 week   Short term goal 1: Pt to perform sit to stand with CGA for ability to transfer for increased mobility. Short term goal 2: Pt to perform all bed mobility with CGA for ability to get into and out of bed. Short term goal 3: Pt to negotiate 3 steps with consistent CGA for ability to get into and out of home. Short term goal 4: Pt to ambulate 100 feet with RW with consistent CGA for increased functional mobility. Short term goal 5: Pt to perform car transfer with CGA for ability to get to and from appointments. Long term goals  Time Frame for Long term goals : 3 weeks   Long term goal 1: Pt to perform sit to stand with supervision for ability to transfer for walking. Long term goal 2: Pt. to perform all bed mobility with supervision for ability to get into and out of bed. Long term goal 3: Pt to negotiate 3 steps with stand by assistance for ability to get into and out of home.    Long term goal 4: Pt to ambulate 200 feet with RW with stand by assistance; 100 feet without AD with stand by assistance for increased functional mobility. Long term goal 5: Pt. to perform car transfer with supervision for ability to get to and from appointments. If patient is discharged prior to progress note completion, this note is to serve as the discharge note with all goals being unmet unless indicated otherwise.

## 2019-03-29 NOTE — PROGRESS NOTES
[address]  DAILY NOTE    Time:  Time In: 1405  Time Out: 1420  Timed Code Treatment Minutes: 15 Minutes  Minutes: 15     Variance: -5   Leaving for CT scan. Date: 3/29/2019  Patient Name: Josseline Howard,   Gender: male      Room: [facility]/056-A  MRN: [id number]  : 1931  (80 y.o.)  Referring Practitioner: Dr. Bere Lopez  Diagnosis: subdural hematoma due to concussion without loss of consciousness  Additional Pertinent Hx: He was admitted 3/17/2019 as a level III trauma after a fall at home where he struck the LEFT side of his head on a nightstand. Per report the patient had switched sides of the bed with his wife; she was scheduled to have lumbar back surgery soon and sleeping on his side of the bed was easier for her. His neurological examination was normal with a GCS of 15 and his CT of the head did show a subdural hematoma over the LEFT hemisphere with a 5 mm LEFT to RIGHT shift. Neurosurgery was consulted and requested a 6 hour follow-up image which showed a significant increase in the size of the hematoma with marked mass effect and a 14 mm midline shift. The patient was taken emergently to the operating room by Dr. Gayl Homans for a craniectomy with evacuation of the LEFT subdural hematoma blood products on 3/17/2019. Since this procedure the patient has been somewhat more confused, has some noted hypoxia with a 3 L oxygen requirement via nasal cannula, a postoperative blood loss anemia with hemoglobin decreased 3 units and requiring 2 units of platelets to be transfused. The patient was on a daily inpatient rehabilitation unit in  after sustaining a subarachnoid hemorrhage after a fall with a brief loss of consciousness.   Since that admission he has been diagnosed with Parkinson disease in 2016 and is followed by Dr. Tacos Vizcaino.    Past Medical History:   Diagnosis Date    Ankle fracture 2016    left in cast    Arthritis     BPH (benign prostatic hyperplasia)     CAD (coronary artery disease)     margarette    Cancer of bladder, neck (HCC)     Cervical vertebra fusion     Colon polyps 2010    [facility]    Foot drop, left 9/8/2015    GERD (gastroesophageal reflux disease)     Hyperlipemia     Macular degeneration     Melanoma ([address]     Neck, Scalp    RISHABH on CPAP     Parkinson disease ([address] 2016    Presbycusis of both ears, uses hearing aids     Spinal stenosis     Subdural hematoma ([address] 2015       resolved     Syncope     Traumatic subdural hemorrhage without loss of consciousness ([address] 3/17/2019     Past Surgical History:   Procedure Laterality Date    BACK SURGERY      BLADDER SURGERY  5/31/11    BLADDER TUMOR EXCISION  2011    BLEPHAROPLASTY  2007, 2008    CARDIAC CATHETERIZATION  2009    moderate    margarette    CARDIAC CATHETERIZATION  11/2018    CATARACT REMOVAL Bilateral 1995    CERVICAL FUSION  2009    COLONOSCOPY  9-24-10      [facility]  [facility]    CORONARY ANGIOPLASTY WITH STENT PLACEMENT  11/2018     [facility] eluting stent dr Lynn Hang Left 3/17/2019    LEFT  FRONTOTEMPOROPARIETAL CRANIECTOMY WITH EVACUATION OF LEFT PANHEMISPHERIC ACUTE SUBDURAL HEMATOMA performed by Ariella Hoffman MD at [facility]  .6/03/2015    CYSTOSCOPY  08/2018    bladder tumor    [place]    DIAGNOSTIC CARDIAC CATH LAB PROCEDURE      EYE SURGERY      HEMORRHOID SURGERY  1994    TX CYSTOURETHROSCOPY,FULGUR .5-2CM LESN N/A 8/3/2018    CYSTOSCOPY TRANSURETHRAL RESECTION BLADDER TUMOR, SMALL performed by Daja Mckeon MD at [address]      left [place] - Dr Asa Loza      neck and scalp melanoma, cheek basal cell    SKIN CANCER EXCISION      left [place] - Dr Erin Jon         Restrictions/Precautions:  Fall Risk, General Precautions        Other position/activity restrictions: Protective helmet on when up. OK to remove in supine only. Prior Level of Function:  ADL Assistance: Independent  Homemaking Assistance: Independent  Ambulation Assistance: Independent  Transfer Assistance: Independent  Additional Comments: Per spouse, pt was very indep with all ADL tasks at home. Pt completed all the driving at this time d/t spouse health problems. Pt did use cane in community. Pt.'s wife stated that his Parkinsons has caused him to have L sided arm and jaw tremors and she feels that it is currently effecting is recall and has difficulty with word finding. Subjective       Subjective: fatigued. restless laying supine in bed. spouse present. Pt clenching R fist throughout and mod difficulty to open it . Pt not responding well to conversation of attempting to participate. figidity on the O2 line. Comments: supportive contact made with spouse as she was concerned with pt's status, why he was clenching fist, how he was tolerating the current session vs sessions today, concerned with the upcoming CT scan. Pain:  Pain Assessment  Patient Currently in Pain: (none noted during session)       Objective       Bed mobility  Rolling to Right: Minimal assistance                Exercises  Grasp/Release: PROM R hand to tolerance, Pt consistently wanting to place R fisted hand under head. not following commands for AAROM . wash cloth placed in R hand for support.                                      Activity Tolerance:  Activity Tolerance: Patient limited by fatigue;Treatment limited secondary to decreased cognition    Assessment:     Performance deficits / Impairments: Decreased functional mobility , Decreased safe awareness, Decreased ROM, Decreased endurance, Decreased balance, Decreased ADL status, Decreased strength, Decreased cognition, Decreased high-level IADLs  Prognosis: Fair    Discharge Recommendations:  Discharge Recommendations: Continue to assess pending progress    Patient Education:  Patient Education: attention to task,     Equipment Recommendations: Other: Pt has a shower chair, grab bars in BR and shower. Safety:  Safety Devices in place: Yes  Type of devices: All fall risk precautions in place, Call light within reach, Gait belt, Patient at risk for falls, Left in chair, Chair alarm in place, Positioning belt    Plan:  Times per week: 5xs week x 90 min, 1 x week x 30 min  Current Treatment Recommendations: Strengthening, Balance Training, Endurance Training, Neuromuscular Re-education, Patient/Caregiver Education & Training, Self-Care / ADL, Safety Education & Training, Functional Mobility Training, Home Management Training, Equipment Evaluation, Education, & procurement, Cognitive/Perceptual Training    Goals:  Patient goals : spouse reporting she wants pt to get stronger and eventually return home after rehab. Pt stated \"yeah\"    Short term goals  Time Frame for Short term goals: 1 week  Short term goal 1: Pt will initiate ADL routine with no > mod cues for attention to task and continueation of task. to increase initiation into daily activities.    Short term goal 2: PT will complete functional ambulation to and from the BR with no > min A x 1 and RW with no > Mod cues for R LE placement and awareness to increase ability tc complete toileting routine  Short term goal 3: Pt will incorporate R hand into daily dressing tasks including opening the hand to grasp for items with no > mod cues for increased independence in grooming tasksq  Short term goal 4: Pt to demo dynamic standing balance > 2 min with 0-1 UE support when reaching outside base of support with CGA in prep for completing clothing management after toileting and sinkside ADL tasks   Long term goals  Time Frame for Long term goals : 4-5 weeks  Long term goal 1: Pt will complete ADL routine with no > cues for initiation to task and problem solving tasks to increase independence in self care tasks  Long term goal 2: PT will complete functional ambulation to and from the BR as well as around obstacles with SBA and no > min cues for attention to safe mobiltiy to increase independence in self care tasks  Long term goal 3: Pt will follow 1 step commands for basic homemaking tasks with no > min cues for attention to task to increase independence in retrieving a snack. If patient is discharged prior to progress note completion, this note is to serve as the discharge note with all goals being unmet unless indicated otherwise.

## 2019-03-29 NOTE — FLOWSHEET NOTE
[address]  PHYSICAL THERAPY MISSED TREATMENT NOTE  INPATIENT REHABILITATION    Date: 3/29/2019  Patient Name: Virginia Uribe        MRN: [id number]   : 1931  (80 y.o.)  Gender: male   Referring Practitioner: Dr. Josh Cruz  Referring Practitioner: Silvio Diallo MD  Diagnosis: subdural hematoma due to concussion without loss of consciousness  Diagnosis: Subdural Hematoma due to concussion without loss of conciousness          REASON FOR MISSED TREATMENT:  Patient at testing and/or off unit. Pt. being transferred off floor for CT scan at time of attempt.  Missed time: 30 minutes

## 2019-03-29 NOTE — PROGRESS NOTES
Moisés Geller is a 80 y.o. male patient.     Current Facility-Administered Medications   Medication Dose Route Frequency Provider Last Rate Last Dose    dextrose 5 % solution   Intravenous Continuous Velvet Short MD 50 mL/hr at 03/28/19 1549      glucose (GLUTOSE) 40 % oral gel 15 g  15 g Oral PRN Velvet Short MD        dextrose 50 % solution 12.5 g  12.5 g Intravenous PRN Velvet Short MD        glucagon (rDNA) injection 1 mg  1 mg Intramuscular PRN Velvet Short MD        dextrose 5 % solution  100 mL/hr Intravenous PRN Velvet Short MD        traZODone (DESYREL) tablet 75 mg  75 mg Oral Nightly Velvet Short MD   75 mg at 03/28/19 2056    potassium replacement protocol   Other RX Placeholder Hallie Victoria MD        hydrocortisone 1 % cream   Topical BID Hallie Victoria MD        vitamin D (CHOLECALCIFEROL) tablet 1,000 Units  1,000 Units Oral Daily Velvet Short MD   1,000 Units at 03/28/19 1000    finasteride (PROSCAR) tablet 5 mg  5 mg Oral Daily Velvet Short MD   5 mg at 03/28/19 1154    acetaminophen (TYLENOL) tablet 650 mg  650 mg Oral Q4H PRN Titus Tapia PA-C   650 mg at 03/28/19 1264    bacitracin ophthalmic ointment   Topical TID Titus Tapia PA-C        famotidine (PEPCID) tablet 20 mg  20 mg Oral BID Titus Tapia PA-C   20 mg at 03/28/19 2055    magnesium hydroxide (MILK OF MAGNESIA) 400 MG/5ML suspension 30 mL  30 mL Oral Daily PRN Titus Tapia PA-C        sodium chloride flush 0.9 % injection 10 mL  10 mL Intravenous 2 times per day Titus Tapia PA-C   10 mL at 03/28/19 0959    sodium chloride flush 0.9 % injection 10 mL  10 mL Intravenous PRN Titus Tapia PA-C        amLODIPine (NORVASC) tablet 2.5 mg  2.5 mg Oral Daily Titus Tapia PA-C   2.5 mg at 03/28/19 0956    atorvastatin (LIPITOR) tablet 10 mg  10 mg Oral Nightly Titus Tapia PA-C   10 mg at 03/28/19 2056    levETIRAcetam (KEPPRA) tablet 500 mg  500 murmur. Abdomen: Abdomen is soft. Bowel sounds are normal.   There is no abdominal tenderness. Extremities: Normal range of motion. Pulses: Distal pulses are intact. Neurological: Patient is alert and oriented to person, place and time. Assessment:    Condition: In stable condition. Improving. (Fall with left subdural evacuation and  Weakness     htn stable      dysphagia and  For  modifiied barium swallow    parkinsons  stable        ashd  stable       bph sable     cognitive  Expressive  And  receptive changes    ). Plan:   Per physical therapy. Consults: physical therapy, occupational therapy and speech therapy. Advance diet as tolerated. Imaging Plan: for  modified  barium swallow. Administer medications as ordered. (  Continue  With therapies      desyrel  For  Sleep      Cognitive  Function  Large  Issue ).        Bal Bo MD  3/28/2019

## 2019-03-29 NOTE — PLAN OF CARE
Problem: DISCHARGE BARRIERS  Goal: Patient's continuum of care needs are met  Note:   Team conference held Thursday, 03/28/2019. Recommendations of the team were explained to the patient and spouse, Alexis Ramirez, by FRANK Lorenzo, and Dr. Fran Jimenez. Team is recommending that patient continue on acute inpatient rehab for approximately two and a half more weeks, with expected discharge date of Monday, 04/15/2019. Await further progress to determine appropriate discharge recommendations. Care plan reviewed with patient and spouse, Alexis Ramirez. Patient and spouse, Alexis Ramirez, verbalized understanding of the plan of care and contributed to goal setting. SW to follow and maintain involvement in discharge planning.

## 2019-03-29 NOTE — CONSULTS
Consults   Nutrition Assessment (Parenteral Nutrition)    Type and Reason for Visit: Reassess, Consult(PPN to Start 4/1)    Nutrition Recommendations: Plan to start Clinimix 4.25/5 tonight and over the weekend based on dosing weight of 56 kg. *NPO per SLP. Nutrition Assessment: Pt. declining from a nutritional standpoint AEB NPO status per SLP on 3/28 until mental status improved and plan to start Clinimix 4.25/5 tonight and over the weekend. Pt remains at risk for further nutritional compromise r/t ongoing poor nutritional status, ongoing AMS/agitation, and need for nutrition support. Labs: K+ 3.3, Phos 3.3, Mg 2, & Glucose 104. Plan to start Clinimix 4.25/5 tonight and over the weekend based on dosing weight of 56 kg per pharmacy. NPO per SLP. Malnutrition Assessment:  · Malnutrition Status:  At risk for malnutrition    Nutrition Risk Level: High    Nutrient Needs:  · Estimated Daily Total Kcal: 7263-3171 kcal/d (30-35 kcal/kg 56 kg on 3/28)  · Estimated Daily Protein (g): 84+ g/day (1.5+ g/kg - 56 kg on 3/28)  · Estimated Daily Total Fluid (ml/day): 1078-3162 mL/day (30-35 mL/kg - 56 kg on 3/28)    Nutrition Diagnosis:   · Problem: Inadequate oral intake  · Etiology: related to Insufficient energy/nutrient consumption     Signs and symptoms:  as evidenced by NPO status due to medical condition, Nutrition support - PN    Objective Information:  · Nutrition-Focused Physical Findings: SLP recommends NPO on 3/28 until mental status improves; +agitation; [facility]; +AMS/legarthy; thin; last BM x1 3/26  · Wound Type: Surgical Wound(face + head staples and wounds)  · Current Nutrition Therapies:  · Oral Diet Orders: NPO(per SLP unitl mental status improved)   · Parenteral Nutrition Orders:  · Type and Formula: Premix Peripheral(Clinimix 4.25/5 based on dosing weight of 56 kg, 10 kcal/kg)   · Lipids: None  · Rate/Volume: per pharmacy  · Duration: Continuous  · Current PN Order Provides: 560 kcal per 24 hours  · Anthropometric Measures:  · Ht: 5' 10\" (177.8 cm)   · Current Body Wt: 128 lb 8 oz (58.3 kg)(3/28; +1 perineal edema)  · Admission Body Wt: 125 lb 9.6 oz (57 kg)(3/25; +1 perineal edema)  · Usual Body Wt: 150 lb (68 kg)(per pt wife.   153 lbs (1/2019))  · % Weight Change:  18% loss over past 3 months  · Ideal Body Wt: 166 lb (75.3 kg), % Ideal Body 77%  · BMI Classification: BMI <18.5 Underweight    Nutrition Interventions:   Continue NPO, Start Parenteral Nutrition(NPO per SLP.)  Continued Inpatient Monitoring, Education not appropriate at this time, Coordination of Care    Nutrition Evaluation:   · Evaluation: Goals set   · Goals: Pt will tolerate adequate nutrition support to meet 75% or more of estimated nutritional needs during LOS until appropriate to transition to PO feeds   · Monitoring: Nutrition Progression, PN Intake, PN Tolerance, Skin Integrity, Wound Healing, Mental Status/Confusion, Weight, Pertinent Labs, I&O, Monitor Bowel Function      Electronically signed by Mavis Halsted, RD, LD on 3/29/19 at 2:43 PM    Contact Number: [phone]

## 2019-03-29 NOTE — PROGRESS NOTES
[address]  DAILY NOTE    Time:  Time In: 0900  Time Out: 1010  Timed Code Treatment Minutes: 70 Minutes  Minutes: 70          Date: 3/29/2019  Patient Name: Vin Ortega,   Gender: male      Room: -56/056-A  MRN: [id number]  : 1931  (80 y.o.)  Referring Practitioner: Dr. Rahul Nieto  Diagnosis: subdural hematoma due to concussion without loss of consciousness  Additional Pertinent Hx: He was admitted 3/17/2019 as a level III trauma after a fall at home where he struck the LEFT side of his head on a nightstand. Per report the patient had switched sides of the bed with his wife; she was scheduled to have lumbar back surgery soon and sleeping on his side of the bed was easier for her. His neurological examination was normal with a GCS of 15 and his CT of the head did show a subdural hematoma over the LEFT hemisphere with a 5 mm LEFT to RIGHT shift. Neurosurgery was consulted and requested a 6 hour follow-up image which showed a significant increase in the size of the hematoma with marked mass effect and a 14 mm midline shift. The patient was taken emergently to the operating room by Dr. Lay Frederick for a craniectomy with evacuation of the LEFT subdural hematoma blood products on 3/17/2019. Since this procedure the patient has been somewhat more confused, has some noted hypoxia with a 3 L oxygen requirement via nasal cannula, a postoperative blood loss anemia with hemoglobin decreased 3 units and requiring 2 units of platelets to be transfused. The patient was on a daily inpatient rehabilitation unit in  after sustaining a subarachnoid hemorrhage after a fall with a brief loss of consciousness.   Since that admission he has been diagnosed with Parkinson disease in 2016 and is followed by Dr. Christi Woods.    Past Medical History:   Diagnosis Date    Ankle fracture 2016    left in cast    Arthritis     BPH (benign Assistance: Independent  Homemaking Assistance: Independent  Ambulation Assistance: Independent  Transfer Assistance: Independent  Additional Comments: Per spouse, pt was very indep with all ADL tasks at home. Pt completed all the driving at this time d/t spouse health problems. Pt did use cane in community. Pt.'s wife stated that his Parkinsons has caused him to have L sided arm and jaw tremors and she feels that it is currently effecting is recall and has difficulty with word finding. Subjective       Subjective: fatigued. did follow 1 step commands for ADLs 35-40% of the time, for mobility with tactile cueing 75 % of the time. short , 1-2 word commands throughout. Spouse present and supportive. Comments: Nursing reported that the pt slept through the night last evening. Spouse reported that the sitter stated he was still off and on for sleep through the night. Pain:  Pain Assessment  Patient Currently in Pain: Denies       Objective      Bed mobility  Rolling to Right: Minimal assistance  Supine to Sit: Moderate assistance  Scooting: Minimal assistance    Transfers  Sit to stand: Moderate assistance  Stand to sit: Minimal assistance       Balance  Sitting Balance: Contact guard assistance  Standing Balance: Minimal assistance           Functional Mobility  Functional - Mobility Device: Rolling Walker  Activity: To/from bathroom  Assist Level: Minimal assistance  Functional Mobility Comments: from EOB to BR. then ambulated from BR to w/c x 5 feet away. followed with  w/c throughout due to cognition and fatigue. Apparatus Needs: O2(3 Liters O2. )                                                                                           Groomin - Able to perform 1-2 tasks (max assist)(physical assist to lift R UE to face then pt able to assist with washing face.  min A for washing hands.) Dressing-Upper: 2 - Requires assist with 3 tasks(Hand over hand for pt to wash UEs, PT intermittently washed chest and ABD. max cues for intiation, problem solving, attention with increased time needed for task. dep for LEs and Bottom when standing. sponge bath completed. )                                                                               Dressing-Lower: 2 - Requires assist with 4-5 parts of dressing(assist for pants over feet, socks and shoes. PT did follow directions to lift feet to don socks and shoes. mod A for pullling pants up.)                                                                               Toiletin - Able to perform 1 task only (e.g. hygiene)(max A for clothing mgt up and down. CGA to close SBA For balance seated on the toilet.)                                                                               Toilet Transfer: 2 - Requires 50-74% assist getting off toilet(min A stand to sit. mod A sit stand from a std height toilet and physical assist for placing R hand on grab bar then pt able to pull self up with mod A.)                                                                            Activity Tolerance:  Activity Tolerance: Patient limited by fatigue;Treatment limited secondary to decreased cognition    Assessment:     Performance deficits / Impairments: Decreased functional mobility , Decreased safe awareness, Decreased ROM, Decreased endurance, Decreased balance, Decreased ADL status, Decreased strength, Decreased cognition, Decreased high-level IADLs  Prognosis: Fair    Discharge Recommendations:  Discharge Recommendations: Continue to assess pending progress    Patient Education:  Patient Education: attention to task, problem solving with ADLs. Equipment Recommendations: Other: Pt has a shower chair, grab bars in BR and shower. Safety:  Safety Devices in place: Yes  Type of devices:  All fall risk precautions in place, Call light within reach, Gait belt, Patient at risk for falls, Left in chair, Chair alarm in place, Positioning belt(sitter present, spouse present at EOS.)    Plan:  Times per week: 5xs week x 90 min, 1 x week x 30 min  Current Treatment Recommendations: Strengthening, Balance Training, Endurance Training, Neuromuscular Re-education, Patient/Caregiver Education & Training, Self-Care / ADL, Safety Education & Training, Functional Mobility Training, Home Management Training, Equipment Evaluation, Education, & procurement, Cognitive/Perceptual Training    Goals:  Patient goals : spouse reporting she wants pt to get stronger and eventually return home after rehab. Pt stated \"yeah\"    Short term goals  Time Frame for Short term goals: 1 week  Short term goal 1: Pt will initiate ADL routine with no > mod cues for attention to task and continueation of task. to increase initiation into daily activities.    Short term goal 2: PT will complete functional ambulation to and from the BR with no > min A x 1 and RW with no > Mod cues for R LE placement and awareness to increase ability tc complete toileting routine  Short term goal 3: Pt will incorporate R hand into daily dressing tasks including opening the hand to grasp for items with no > mod cues for increased independence in grooming tasksq  Short term goal 4: Pt to demo dynamic standing balance > 2 min with 0-1 UE support when reaching outside base of support with CGA in prep for completing clothing management after toileting and sinkside ADL tasks   Long term goals  Time Frame for Long term goals : 4-5 weeks  Long term goal 1: Pt will complete ADL routine with no > cues for initiation to task and problem solving tasks to increase independence in self care tasks  Long term goal 2: PT will complete functional ambulation to and from the BR as well as around obstacles with SBA and no > min cues for attention to safe mobiltiy to increase independence in self care tasks  Long term goal 3: Pt will follow 1 step commands for basic homemaking tasks with no > min cues for attention to task to increase independence in retrieving a snack. If patient is discharged prior to progress note completion, this note is to serve as the discharge note with all goals being unmet unless indicated otherwise.

## 2019-03-29 NOTE — PROGRESS NOTES
PPN Follow Up Note    Assessment: Patient to start PPN tonight. Clinimix will be ordered for tonight and through the weekend.     Electrolyte Replacement: KCl 10mEq IVPB x 4 doses    Clinimix to start at 10 kcal per kg (total is 560 kcal per 24 hours)    Re-check BMP, Mg, PO4, iCa 3/30 am.    Macie Puga RPh, BCPS, BCGP  3/29/2019     2:17 PM

## 2019-03-29 NOTE — PROGRESS NOTES
[facility]  Recreational [facility]  Evaluation  Inpatient Rehabilitation Unit      Time Spent with Patient: 25 minutes    Date:  3/29/2019       Patient Name: Chikis Sol      MRN: [id number]       YOB: 1931 (80 y.o.)       Gender: male  Diagnosis: subdural hematoma due to concussion without loss of consciousness  Referring Practitioner: Dr. Evelyn Schilling    RESTRICTIONS/PRECAUTIONS:  Restrictions/Precautions: Fall Risk, General Precautions  Vision: Impaired  Hearing: Exceptions to [facility] Exceptions: Hard of hearing/hearing concerns, Bilateral hearing aid    PAIN: 0-no c/o pain     SUBJECTIVE:  Pt lives with wife-they each have 3 children     VISION:  Visual Impairment-macular degeneration-L neglect     HEARING: Hearing Aid - Left & Right    LEISURE INTERESTS:   Due to severe expressive aphasia wife answered all questions-pt sitting up in w/c -he has parkinson disease- he has macular degeneration and B hearing aides -wife does all the cooking and the cleaning-sets up pt medications and sets up all Dr appointments-they have a strong [employer] family, at home he likes to watch tv or play games on his ipad, a few years ago they would get out and go and walk trails at [employer] all over [place] walked the 1 mile trails to stay active and enjoyed going somewhere to eat out - when they were both still working they travelled all over the world and enjoyed that-pt was  an  and he worked until he was 70 yrs old and was not ready to retire even then-wife has made plans before pts fall to move into [facility] together in June  to take a lot of the stress off of her of everyday stressors -supportive contact given-    BARRIERS TO LEISURE INTERESTS:    Communication deficit, Decreased endurance and Impaired vision    FIMS  Social Interaction: 1 - Patient appropriate < 25% of the time    Patient Education  New Education Provided: Importance of Leisure, RT Plan of Care    Plan:  Continue to

## 2019-03-30 NOTE — PROGRESS NOTES
[address] [address]  INPATIENT PHYSICAL THERAPY  DAILY NOTE  [facility] - 7E-56/056-A    Time In: 1406  Time Out: 1440  Timed Code Treatment Minutes: 34 Minutes  Minutes: 34          Date: 3/30/2019  Patient Name: Yadiel Zavala,  Gender:  male        MRN: [id number]  : 1931  (80 y.o.)  Referral Date : 19  Referring Practitioner: Liliya Oro MD  Diagnosis: Subdural Hematoma due to concussion without loss of conciousness   Treatment Diagnosis: decreased balance, decreased strength, decreased endurance, unsteady gait   Additional Pertinent Hx: He was admitted 3/17/2019 as a level III trauma after a fall at home where he struck the LEFT side of his head on a nightstand. Per report the patient had switched sides of the bed with his wife; she was scheduled to have lumbar back surgery soon and sleeping on his side of the bed was easier for her. His neurological examination was normal with a GCS of 15 and his CT of the head did show a subdural hematoma over the LEFT hemisphere with a 5 mm LEFT to RIGHT shift. Neurosurgery was consulted and requested a 6 hour follow-up image which showed a significant increase in the size of the hematoma with marked mass effect and a 14 mm midline shift. The patient was taken emergently to the operating room by Dr. Denia Membreno for a craniectomy with evacuation of the LEFT subdural hematoma blood products on 3/17/2019. Since this procedure the patient has been somewhat more confused, has some noted hypoxia with a 3 L oxygen requirement via nasal cannula, a postoperative blood loss anemia with hemoglobin decreased 3 units and requiring 2 units of platelets to be transfused. The patient was on a daily inpatient rehabilitation unit in  after sustaining a subarachnoid hemorrhage after a fall with a brief loss of consciousness.   Since that admission he has been diagnosed with Parkinson disease in 2016 and is followed by Dr. Mary Adam. position/activity restrictions: Protective helmet on when up. OK to remove in supine only. Prior Level of Function:  ADL Assistance: Independent  Homemaking Assistance: Independent  Ambulation Assistance: Independent  Transfer Assistance: Independent  Additional Comments: Per spouse, pt was very indep with all ADL tasks at home. Pt completed all the driving at this time d/t spouse health problems. Pt did use cane in community. Pt.'s wife stated that his Parkinsons has caused him to have L sided arm and jaw tremors and she feels that it is currently effecting is recall and has difficulty with word finding. Subjective:     Subjective: Patient in bed upon arrival, very restless in bed, sitter at bedside, friends present in room. Patient able to tell therapist name during session but no other information. Incontinent in depends during session, needing depends changed and had patienet sit on [facility] for short time. Pain:  (no facial grimacing or other indications of pain throughout session). Social/Functional:  Lives With: Spouse  Type of Home: House  Home Layout: One level  Home Access: Stairs to enter with rails  Entrance Stairs - Number of Steps: 3  Entrance Stairs - Rails: Left  Home Equipment: Cane     Objective:  Supine to Sit: Maximum assistance; Moderate assistance(Mod-Max. assist at trunk to sit upright, max verbal cues for technique)  Scooting: Minimal assistance(to scoot forward and back in seated position)    Transfers  Sit to Stand: Minimal Assistance;Maximum Assistance(fluctuated between Min-Max x1 and Min of another depending on patient's ability to process verbal cues)  Stand to sit: Minimal Assistance;Contact guard assistance(Flucutated between CGA-Min. A x1-2 depending on patient's abiltiy to process cues)  Stand Pivot Transfer:  Moderate Assistance;Contact guard assistance(Mod. A x1 and CGA of another, using RW;  max verbal cues for sequencing and turning walker)       Ambulation Recommendations:  Discharge Recommendations: Continue to assess pending progress    Patient Education:  Patient Education: POC, balance, gait, transfers   Barriers to Learning: cognition    Equipment Recommendations:  Equipment Needed: Yes(Continue to assess (possbile RW))    Safety:  Type of devices: All fall risk precautions in place, Gait belt, Call light within reach, Patient at risk for falls, Sitter present, Left in chair  Restraints  Initially in place: No    Plan:  Times per week: 5x/ week 90 minutes; 1x/week 30 minutes  Times per day: Daily  Current Treatment Recommendations: Strengthening, Transfer Training, Endurance Training, Neuromuscular Re-education, Patient/Caregiver Education & Training, ROM, ADL/Self-care Training, Balance Training, Gait Training, Home Exercise Program, Functional Mobility Training, Stair training, Safety Education & Training, Equipment Evaluation, Education, & procurement    Goals:       Short term goals  Time Frame for Short term goals: 1 week   Short term goal 1: Pt to perform sit to stand with CGA for ability to transfer for increased mobility. Short term goal 2: Pt to perform all bed mobility with CGA for ability to get into and out of bed. Short term goal 3: Pt to negotiate 3 steps with consistent CGA for ability to get into and out of home. Short term goal 4: Pt to ambulate 100 feet with RW with consistent CGA for increased functional mobility. Short term goal 5: Pt to perform car transfer with CGA for ability to get to and from appointments. Long term goals  Time Frame for Long term goals : 3 weeks   Long term goal 1: Pt to perform sit to stand with supervision for ability to transfer for walking. Long term goal 2: Pt. to perform all bed mobility with supervision for ability to get into and out of bed. Long term goal 3: Pt to negotiate 3 steps with stand by assistance for ability to get into and out of home.    Long term goal 4: Pt to ambulate 200 feet with RW with stand by assistance; 100 feet without AD with stand by assistance for increased functional mobility. Long term goal 5: Pt. to perform car transfer with supervision for ability to get to and from appointments. If patient is discharged prior to progress note completion, this note is to serve as the discharge note with all goals being unmet unless indicated otherwise.

## 2019-03-30 NOTE — PLAN OF CARE
Care plan reviewed with patient. Patient verbalize understanding of the plan of care and contribute to goal setting. Problem: Falls - Risk of:  Goal: Will remain free from falls  Description  Will remain free from falls  Outcome: Ongoing  Note:   Fall protocol initiated. Patient has a live sitter to help prevent falls. Problem: Falls - Risk of:  Goal: Absence of physical injury  Description  Absence of physical injury  Outcome: Ongoing  Note:   Patient has a live sitter      Problem: Risk for Impaired Skin Integrity  Goal: Tissue integrity - skin and mucous membranes  Description  Structural intactness and normal physiological function of skin and  mucous membranes. Outcome: Ongoing  Note:   Patient repositions self throughout shift      Problem: Pain:  Goal: Pain level will decrease  Description  Pain level will decrease  Outcome: Ongoing  Note:   Patient denies pain      Problem: Pain:  Goal: Control of chronic pain  Description  Control of chronic pain  Outcome: Ongoing  Note:   Patient denies pain      Problem: IP BOWEL ELIMINATION  Goal: LTG - patient will utilize adaptive techniques/equipment to complete bowel elimination  Outcome: Ongoing  Note:   Patient is very confused and is incontinent of bowel. Problem: IP BOWEL ELIMINATION  Goal: LTG - patient will have regular and routine bowel evacuation  Outcome: Ongoing  Note:   Patient is very confused and is incontinent of bowel. Problem: IP BOWEL ELIMINATION  Goal: STG - patient will be accident free  Outcome: Ongoing  Note:   Patient is very confused and is incontinent of bowel. Problem: IP BLADDER/VOIDING  Goal: LTG - Patient will utilize adaptive techniques/equipment to complete bladder elimination  Outcome: Ongoing  Note:   Patient is very confused and is incontinent of urine. Patient does not inform staff when he has to void.       Problem: IP BLADDER/VOIDING  Goal: LTG - patient will achieve acceptable level of continence  Outcome: Ongoing  Note:   Patient is very confused and is incontinent of urine. Patient does not inform staff when he has to void. Problem: HEMODYNAMIC STATUS  Goal: Patient has stable vital signs and fluid balance  Outcome: Ongoing  Note:   See doc flow sheet      Problem: Infection - Surgical Site:  Goal: Will show no infection signs and symptoms  Description  Will show no infection signs and symptoms  Outcome: Ongoing  Note:   Labs are being monitored. Patient is on ATB therapy      Problem: Nutrition Deficit:  Goal: Ability to achieve adequate nutritional intake will improve  Description  Ability to achieve adequate nutritional intake will improve  Outcome: Ongoing  Note:   Patient on PPN      Problem: IP BLADDER/VOIDING  Goal: LTG - patient will complete bladder elimination  Note:   Patient is very confused and is incontinent of urine. Patient does not inform staff when he has to void.

## 2019-03-30 NOTE — PROGRESS NOTES
Bennie Wilcox is a 80 y.o. male patient.     Current Facility-Administered Medications   Medication Dose Route Frequency Provider Last Rate Last Dose    levETIRAcetam (KEPPRA) 500 mg in sodium chloride 0.9 % 100 mL IVPB  500 mg Intravenous Q12H Mani Falcon MD   Stopped at 03/29/19 1525    CLINIMIX 4.25/5 4.25 % SOLN 2,000 mL  2,000 mL Intravenous Continuous Mani Falcon MD        0.9 % sodium chloride infusion   Intravenous Continuous Mani Falcon MD 50 mL/hr at 03/29/19 1539      ampicillin-sulbactam (UNASYN) 3 g ivpb minibag  3 g Intravenous Q8H Génesis Cotto  mL/hr at 03/29/19 2226 3 g at 03/29/19 2226    albuterol (PROVENTIL) nebulizer solution 2.5 mg  2.5 mg Nebulization Q4H PRN Génesis Cotto MD        acetaminophen (TYLENOL) suppository 650 mg  650 mg Rectal Q4H PRN Mani Falcon MD        diphenhydrAMINE (BENADRYL) injection 25 mg  25 mg Intravenous Nightly Mani Falcon MD   25 mg at 03/29/19 2237    0.9 % sodium chloride infusion   Intravenous Continuous Mani Falcon MD        glucose (GLUTOSE) 40 % oral gel 15 g  15 g Oral PRN Mani Falcon MD        dextrose 50 % solution 12.5 g  12.5 g Intravenous PRN Mani Falcon MD        glucagon (rDNA) injection 1 mg  1 mg Intramuscular PRN Mani Falcon MD        dextrose 5 % solution  100 mL/hr Intravenous PRN Mani Falcon MD        traZODone (DESYREL) tablet 75 mg  75 mg Oral Nightly Mani Falcon MD   75 mg at 03/28/19 2056    potassium replacement protocol   Other RX Placeholder Génesis Cotto MD        hydrocortisone 1 % cream   Topical BID Génesis Cotto MD        vitamin D (CHOLECALCIFEROL) tablet 1,000 Units  1,000 Units Oral Daily Mani Falcon MD   1,000 Units at 03/28/19 1000    finasteride (PROSCAR) tablet 5 mg  5 mg Oral Daily Mani Falcon MD   5 mg at 03/28/19 0959    acetaminophen (TYLENOL) tablet 650 mg  650 mg Oral Q4H PRN Nataly Vasquez PA-C   650 mg at 03/28/19 0955    bacitracin ophthalmic ointment   Topical TID Indra Romero PA-C        famotidine (PEPCID) tablet 20 mg  20 mg Oral BID Indra Romero PA-C   20 mg at 03/28/19 2055    magnesium hydroxide (MILK OF MAGNESIA) 400 MG/5ML suspension 30 mL  30 mL Oral Daily PRN Indra Romero PA-C        sodium chloride flush 0.9 % injection 10 mL  10 mL Intravenous 2 times per day DANICA Fang-C   10 mL at 03/29/19 2234    sodium chloride flush 0.9 % injection 10 mL  10 mL Intravenous PRN Indra Romero PA-C        amLODIPine (NORVASC) tablet 2.5 mg  2.5 mg Oral Daily ELVIN FangC   2.5 mg at 03/28/19 4981    atorvastatin (LIPITOR) tablet 10 mg  10 mg Oral Nightly DANICA Fang-C   10 mg at 03/28/19 2056    multivitamin 1 tablet  1 tablet Oral Daily ELVIN FangC   1 tablet at 03/28/19 1884    tamsulosin (FLOMAX) capsule 0.4 mg  0.4 mg Oral Nightly ELVIN FangC   0.4 mg at 03/28/19 2056    miconazole (MICOTIN) 2 % powder   Topical BID Oj Amaral MD        docusate sodium (COLACE) capsule 100 mg  100 mg Oral BID PRN Oj Amaral MD        ondansetron [facility]) tablet 4 mg  4 mg Oral Q8H PRN Oj Amaral MD        [facility]) tablet 17.2 mg  2 tablet Oral Nightly PRN Oj Amaral MD        carbidopa-levodopa (SINEMET)  MG per tablet 1.5 tablet  1.5 tablet Oral 3 times per day Oj Amaral MD   1.5 tablet at 03/29/19 0096    And    rOPINIRole (REQUIP) tablet 0.5 mg  0.5 mg Oral 3 times per day Oj Amaral MD   0.5 mg at 03/29/19 2430     Allergies   Allergen Reactions    Sulfa Antibiotics      Unknown reaction from childhood     Active Problems:    Subdural hematoma due to concussion [facility])  Resolved Problems:    * No resolved hospital problems. *    Blood pressure (!) 162/64, pulse 82, temperature 97.7 °F (36.5 °C), temperature source Axillary, resp.  rate 18, height 5' 10\" (1.778 m), weight 123 lb 8 oz (56 kg), SpO2 93 %.    Subjective:  Symptoms:  (Worsing as reponse as  Weakness and more  confusion). Diet:  NPO. Activity level: Impaired due to weakness. Objective:  General Appearance:  Comfortable and in no acute distress. Vital signs: (most recent): Blood pressure (!) 162/64, pulse 82, temperature 97.7 °F (36.5 °C), temperature source Axillary, resp. rate 18, height 5' 10\" (1.778 m), weight 123 lb 8 oz (56 kg), SpO2 93 %. Vital signs are normal.    Output: Producing urine and producing stool. HEENT: Normal HEENT exam.    Lungs:  Normal effort and normal respiratory rate. There are decreased breath sounds and rhonchi. Heart: Normal rate. Regular rhythm. S1 normal and S2 normal.  No murmur. Abdomen: Abdomen is soft. Bowel sounds are normal.   There is no abdominal tenderness. Extremities: Normal range of motion. Pulses: Distal pulses are intact. Neurological: Patient has abnormal muscle tone and abnormal coordination. (Less response ). Pupils:  Pupils are equal, round, and reactive to light. Skin:  Warm and dry. Assessment:    Condition: In stable condition. Worsening. (Left side  Subdural evacuated  And with weakness and cognitive change     More problems with weakness and confusion and loss of gag response     htn noted      ashd stable     parkinsons  Noted      ). Plan:   Continue respiratory treatments. Consults: speech therapy, occupational therapy and physical therapy. NPO. Chest x-ray. Start antibiotics and administer medications as ordered. (Concern of aspiration as problems with swallowing  And now less responsiveness     Aerosols prn and start unasyn as wbc up and cxr with right side changes      cultures obtained  And start rx      discussed  With wife code status as full code and ?  How well if patient worsens  And back on vent   And she will discuss with family                                           ).       Cristiano Adam MD  3/29/2019

## 2019-03-30 NOTE — PROGRESS NOTES
[address] [facility] THERAPY  [address]   SLP Individual Minutes  Time In: 0730  Time Out: 0800  Minutes: 30  Timed Code Treatment Minutes: 0 Minutes   DYSPHAGIA THERAPY: 8065-7762  LINGUISTIC THERAPY: 5600-2651       [x]Daily Note  []Progress Note  []Discharge Note    Date: 3/30/2019  Patient Name: Dominic Chapman        MRN: [id number]    : 1931  (80 y.o.)  Gender: male   Primary Provider: Jean-Claude Price MD  Admitting Diagnosis: Subdural hematoma d/t concussion without LOC  Secondary Diagnosis: Dysphagia; cognitive-linguistic deficits   Precautions: Fall risk, aspiration precautions   Swallowing Status/Diet: NPO  Swallowing Strategies: Strict oral care protocol every 2 hours   DATE of last MBS: 3/28  Pain: No pain evidenced this date    Subjective: Pt seen at bedside with wife, Mickey Abdalla, present. Decreased alertness at this time. Live sitter in room at beginning and end of session. Pt exhibiting mild agitation with attempts to pull out IV lines. SHORT TERM GOAL #1:  Goal 1: Complete repeat BSE as appropriate and determine appropriateness to initiate PO diet and/or pt appropriateness for instrumental assessment. INTERVENTIONS: PO trials of nectar liquids by spoon x4. Pt with no attempts to grab spoon when presented with utensil by ST. ST manipulating pt's hand to further maximize functional self feeding; however, pt again with no awareness/initiation d/t severe cognitive decline. ST required to provide feeding assistance this date. Pt with improved elicitation of labial rounding. Decreased strength with impaired extraction of bolus off of spoon. Pt with decreased bolus formation with spillage of bolus to anterior sulcus resulting in mod stasis. Initiation of oral bolus manipulation followed by oral holding with pt need for MAX cues to elicit a swallow response. Elicitation of pharyngeal swallow following 5+ seconds.  Concerns for decreased laryngeal elevation and decreased pharyngeal constriction with suspected high levels of pharyngeal residuals. Certainly cannot determine without instrumental assessment. Pt with presence of coughing to follow. Recommendation to remain NPO with consideration for alternative means of nutrition and strict oral care protocol. Completion of rigorous oral care completed with oral swabs in room prior to and following po trials. Moderate quantatiy of thick mucus removed from oral cavity. SHORT TERM GOAL #2:  Goal 2: Pt will follow basic 1-step commands and answer yes/no questions with 50% accuracy given max cues to improve ability to convey wants/needs. INTERVENTIONS: Multiple attempts to elicit yes/no response and 1 step commands. Verbal and tactile max cues provided for 1 step commands. 0/20 1 step commands. No response to basic yes/no questions x15 attempts. SHORT TERM GOAL #3:  Goal 3: Pt will complete automatic speech, repetition, and confrontational naming tasks with 50% accuracy given max cues to improve verbal expression. INTERVENTIONS: No verbal responses throughout this session. SHORT TERM GOAL #4:  Goal 4: Pt will ID objects/pictures in F02 with 50% accuracy given max cues to permit potential introduction of alternative communication needs. INTERVENTIONS: DNT d/t focus on other goals     SHORT TERM GOAL #5:  Goal 5: Pt will complete BASIC orientation tasks with 50% accuracy given max cues to improve awareness to immediate surroundings. INTERVENTIONS: DNT d/t focus on other goals    Long-term Goals  Timeframe for Long-term Goals: 6 weeks  Goal 1: Pt will safely tolerate dysphagia I diet with thin liquids 98% of the time given compensatory swallowing strategies to ensure safe and adequate nutrition/hydration measures.    Goal 2: Pt will improve expressive and receptive language skills to a mild impairment level or higher to permit improved ability to improve functional communication for

## 2019-03-30 NOTE — PROGRESS NOTES
Past Medical History:   Diagnosis Date    Ankle fracture 2/2016    left in cast    Arthritis     BPH (benign prostatic hyperplasia)     CAD (coronary artery disease)     margarette    Cancer of bladder, neck (HCC)     Cervical vertebra fusion     Colon polyps 2010    [place]han    Foot drop, left 9/8/2015    GERD (gastroesophageal reflux disease)     Hyperlipemia     Macular degeneration     Melanoma ([address]     Neck, Scalp    RISHABH on CPAP     Parkinson disease ([address] 2016    Presbycusis of both ears, uses hearing aids     Spinal stenosis     Subdural hematoma ([address] 2015       resolved     Syncope     Traumatic subdural hemorrhage without loss of consciousness ([address] 3/17/2019     Past Surgical History:   Procedure Laterality Date    BACK SURGERY      BLADDER SURGERY  5/31/11    BLADDER TUMOR EXCISION  2011    BLEPHAROPLASTY  2007, 2008    CARDIAC CATHETERIZATION  2009    moderate    margarette    CARDIAC CATHETERIZATION  11/2018    CATARACT REMOVAL Bilateral 1995    CERVICAL FUSION  2009    COLONOSCOPY  9-24-10      [place]  wnl    CORONARY ANGIOPLASTY WITH STENT PLACEMENT  11/2018     [facility] eluting stent dr Micki Ambrosio Left 3/17/2019    LEFT  FRONTOTEMPOROPARIETAL CRANIECTOMY WITH EVACUATION OF LEFT PANHEMISPHERIC ACUTE SUBDURAL HEMATOMA performed by Martita Best MD at [facility] 9  .6/03/2015    CYSTOSCOPY  08/2018    bladder [place]    [place]    DIAGNOSTIC CARDIAC CATH LAB PROCEDURE      EYE SURGERY      HEMORRHOID SURGERY  1994    DE CYSTOURETHROSCOPY,FULGUR .5-2CM LESN N/A 8/3/2018    CYSTOSCOPY TRANSURETHRAL RESECTION BLADDER TUMOR, SMALL performed by Luis Camejo MD at [address]      left rivera - Dr Eddie Fregoso      neck and scalp melanoma, cheek basal cell    SKIN CANCER EXCISION      left rivear - Dr Rashad Bates         Restrictions/Precautions:  Fall Risk, General Precautions                    Other position/activity restrictions: Protective helmet on when up. OK to remove in supine only. Prior Level of Function:  ADL Assistance: Independent  Homemaking Assistance: Independent  Ambulation Assistance: Independent  Transfer Assistance: Independent  Additional Comments: Per spouse, pt was very indep with all ADL tasks at home. Pt completed all the driving at this time d/t spouse health problems. Pt did use cane in community. Pt.'s wife stated that his Parkinsons has caused him to have L sided arm and jaw tremors and she feels that it is currently effecting is recall and has difficulty with word finding. Subjective:     Subjective: Patient in bathroom upon arrival with sitter,  patient incontinent and needing depends changed, sitter assisting to get patient changed and for chloe-care;  patient incontinent again of urine when standing from toilet,  needing to change pants and depends again. Patient able to tell therapist his name today with prompting but no other information. Pain:  (no facial grimacing or other indications of pain throughout session). Social/Functional:  Lives With: Spouse  Type of Home: House  Home Layout: One level  Home Access: Stairs to enter with rails  Entrance Stairs - Number of Steps: 3  Entrance Stairs - Rails: Left  Home Equipment: Cane     Objective:  Sit to Supine: Moderate assistance(x2 (x1 at trunk and x1 at [place] LEs); not following verbal cues to complete with less assist)  Scooting: Minimal assistance(to scoot forward and back in seated position)    Transfers  Sit to Stand: Moderate Assistance;Maximum Assistance(fluctuated between Mod A x1-2-Max. A 1-2;  max verbal cues and hand over hand for correct hand placement, no follow through noted; multiple trials from toilet and )  Stand to sit: Minimal Assistance;Contact guard assistance(Flucutated between CGA-Min.  A x1-2 depending on patient's abiltiy to process cues)       Ambulation 1  Surface: level

## 2019-03-30 NOTE — PROGRESS NOTES
[address]  DAILY NOTE    Time:  Time In: 0900  Time Out: 1000  Timed Code Treatment Minutes: 60 Minutes  Minutes: 60          Date: 3/30/2019  Patient Name: Usama Cameron,   Gender: male      Room: 7E-56/056-A  MRN: [id number]  : 1931  (80 y.o.)  Referring Practitioner: Dr. Forest Nur  Diagnosis: subdural hematoma due to concussion without loss of consciousness  Additional Pertinent Hx: He was admitted 3/17/2019 as a level III trauma after a fall at home where he struck the LEFT side of his head on a nightstand. Per report the patient had switched sides of the bed with his wife; she was scheduled to have lumbar back surgery soon and sleeping on his side of the bed was easier for her. His neurological examination was normal with a GCS of 15 and his CT of the head did show a subdural hematoma over the LEFT hemisphere with a 5 mm LEFT to RIGHT shift. Neurosurgery was consulted and requested a 6 hour follow-up image which showed a significant increase in the size of the hematoma with marked mass effect and a 14 mm midline shift. The patient was taken emergently to the operating room by Dr. Pardeep Castaneda for a craniectomy with evacuation of the LEFT subdural hematoma blood products on 3/17/2019. Since this procedure the patient has been somewhat more confused, has some noted hypoxia with a 3 L oxygen requirement via nasal cannula, a postoperative blood loss anemia with hemoglobin decreased 3 units and requiring 2 units of platelets to be transfused. The patient was on a daily inpatient rehabilitation unit in  after sustaining a subarachnoid hemorrhage after a fall with a brief loss of consciousness.   Since that admission he has been diagnosed with Parkinson disease in 2016 and is followed by Dr. Chela Enamorado.    Past Medical History:   Diagnosis Date    Ankle fracture 2016    left in cast    Arthritis     BPH (benign prostatic hyperplasia)     CAD (coronary artery disease)     margarette    Cancer of bladder, neck ([address]     Cervical vertebra fusion     Colon polyps 2010    [place]han    Foot drop, left 9/8/2015    GERD (gastroesophageal reflux disease)     Hyperlipemia     Macular degeneration     Melanoma ([address]     Neck, Scalp    RISHABH on CPAP     Parkinson disease ([address] 2016    Presbycusis of both ears, uses hearing aids     Spinal stenosis     Subdural hematoma ([address] 2015       resolved     Syncope     Traumatic subdural hemorrhage without loss of consciousness ([address] 3/17/2019     Past Surgical History:   Procedure Laterality Date    BACK SURGERY      BLADDER SURGERY  5/31/11    BLADDER TUMOR EXCISION  2011    BLEPHAROPLASTY  2007, 2008    CARDIAC CATHETERIZATION  2009    moderate    margarette    CARDIAC CATHETERIZATION  11/2018    CATARACT REMOVAL Bilateral 1995    CERVICAL FUSION  2009    COLONOSCOPY  9-24-10      [place]  wnl    CORONARY ANGIOPLASTY WITH STENT PLACEMENT  11/2018     [facility] drug eluting stent dr Jose Bowles Left 3/17/2019    LEFT  FRONTOTEMPOROPARIETAL CRANIECTOMY WITH EVACUATION OF LEFT PANHEMISPHERIC ACUTE SUBDURAL HEMATOMA performed by Ashanti Mendoza MD at [address] [place] [address]  .6/03/2015    CYSTOSCOPY  08/2018    bladder tumor    [place]    DIAGNOSTIC CARDIAC CATH LAB PROCEDURE      EYE SURGERY      HEMORRHOID SURGERY  1994    CO CYSTOURETHROSCOPY,FULGUR .5-2CM LESN N/A 8/3/2018    CYSTOSCOPY TRANSURETHRAL RESECTION BLADDER TUMOR, SMALL performed by Shanta Solis MD at [address] [place] [address]      left nicole Faye Pink      neck and scalp melanoma, cheek basal cell    SKIN CANCER EXCISION      left nicole Amaral         Restrictions/Precautions:  Fall Risk, General Precautions                    Other position/activity restrictions: Protective helmet on when up. OK to remove in supine only.         Prior Level of Function:  ADL Assistance: Independent  Homemaking Assistance: Independent  Ambulation Assistance: Independent  Transfer Assistance: Independent  Additional Comments: Per spouse, pt was very indep with all ADL tasks at home. Pt completed all the driving at this time d/t spouse health problems. Pt did use cane in community. Pt.'s wife stated that his Parkinsons has caused him to have L sided arm and jaw tremors and she feels that it is currently effecting is recall and has difficulty with word finding. Subjective       Subjective: In bed upon arrival, nurse ok'd session, wife and sitter present, impulsive    Overall Orientation Status: Impaired  Orientation Level: Oriented to place;Oriented to person         Pain:  Pain Assessment  Patient Currently in Pain: (No facial grimacing during session)       Objective  Overall Cognitive Status: Exceptions  Cognition Comment: Followed less than 10% of commands    Bed mobility  Supine to Sit: Maximum assistance(HOB elevated A for B LE and for trunk)    Transfers  Sit to stand:  Moderate assistance(EOB, sitter in room if needed)  Stand to sit: Minimal assistance(bedside chair)         Functional Mobility  Functional - Mobility Device: Rolling Walker  Activity: To/from bathroom  Assist Level: Minimal assistance(for balance and A of another for managing lines)  Functional Mobility Comments: forward flexed posture, A for manuevering walker                                                                                        Groomin - Requires assistance with all tasks(A for applying dentures in mouth, difficulty following commands for task)                                                                                Bathin - Able to bathe 2 or less areas/total assist(patient required hand over hand motion, difficulty following commands, completed sitting on toilet\)                                                                              Dressing-Upper: (hospital Training    Goals:  Patient goals : spouse reporting she wants pt to get stronger and eventually return home after rehab. Pt stated \"yeah\"    Short term goals  Time Frame for Short term goals: 1 week  Short term goal 1: Pt will initiate ADL routine with no > mod cues for attention to task and continueation of task. to increase initiation into daily activities. Short term goal 2: PT will complete functional ambulation to and from the BR with no > min A x 1 and RW with no > Mod cues for R LE placement and awareness to increase ability tc complete toileting routine  Short term goal 3: Pt will incorporate R hand into daily dressing tasks including opening the hand to grasp for items with no > mod cues for increased independence in grooming tasksq  Short term goal 4: Pt to demo dynamic standing balance > 2 min with 0-1 UE support when reaching outside base of support with CGA in prep for completing clothing management after toileting and sinkside ADL tasks   Long term goals  Time Frame for Long term goals : 4-5 weeks  Long term goal 1: Pt will complete ADL routine with no > cues for initiation to task and problem solving tasks to increase independence in self care tasks  Long term goal 2: PT will complete functional ambulation to and from the BR as well as around obstacles with SBA and no > min cues for attention to safe mobiltiy to increase independence in self care tasks  Long term goal 3: Pt will follow 1 step commands for basic homemaking tasks with no > min cues for attention to task to increase independence in retrieving a snack. If patient is discharged prior to progress note completion, this note is to serve as the discharge note with all goals being unmet unless indicated otherwise.

## 2019-03-30 NOTE — PROGRESS NOTES
Dr. Javier Grant in to see patient. Family present. [address] filled out by spouse. Dr. Javier Grant placed orders for patient to be discharged to [facility] and will continue to follow until Monday when Dr. Theresa Jefferson resumes care.

## 2019-03-30 NOTE — PLAN OF CARE
Spoke with Dr. Kriss Burleson in regards to concerns from family and therapists about patients continual decline. Patient continued to attempt therapy and remains alert to name. Unable to swallow medications when crushed in applesauce. This nurse alerted ST to assist as patient appeared to have no gag reflex present. Medications had to be suctioned from oral cavity using [place] and oral care provided. PPN discussed with Dr. Kriss Burleson as nutritional supplementation while patient continues to work with therapy. Clinimax to start this evening after potassium replacement. Pharmacy will be dosing and monitoring lab work. Neurology consulted for management of Parkinsons while unable to continue oral Sinemet. Will be in to see patient on Saturday. This nurse assisted with cleansing and of incontinence and patient hot to touch so rectal temperature obtained. Dr. Kriss Burleson notified of elevated temperature and orders received. Patient catheterized for urine specimen as per order without difficulty. Dr. Tay Lemons in this evening and antibiotics ordered for leukocytosis and chest xray results. He also called and spoke to patients spouse regarding patients decline and code status. Spouse states that she will need to discuss with her children. Will revisit that conversation with spouse tomorrow with Dr. Ravinder Granado. rounding. Sitter remains present at bedside. Family updated per Dr. Tay Lemons and patient remains awake and confused in bed. Requires complete assistance from staff in bed for repositioning and care.

## 2019-03-31 NOTE — PLAN OF CARE
Problem: Safety:  Goal: Free from accidental physical injury  Description  Free from accidental physical injury  Outcome: Ongoing  Note:   Patient was reeducated about the call light button to summon assistance. Patient belongings were all kept within reach. Side rails were up 2/4, and the bed was in the lowest position. Patient did not have a fall throughout this shift. Sitter at bedside. Goal: Free from intentional harm  Description  Free from intentional harm  Outcome: Ongoing     Problem: Daily Care:  Goal: Daily care needs are met  Description  Daily care needs are met  Outcome: Ongoing  Note:   The patient's daily cares were met this shift. The patient was allowed to do as much as they could by themselves, and further assisted by the nurse when needed. Problem: Skin Integrity:  Goal: Skin integrity will stabilize  Description  Skin integrity will stabilize  Outcome: Ongoing  Note:   The patient was turned throughout the night to promote tissue perfusion. The patient tolerated the turns well. During turning, the patient's back was checked for any signs of skin breakdown. Skin wound sites assessed as well. Problem: Discharge Planning:  Goal: Patients continuum of care needs are met  Description  Patients continuum of care needs are met  Outcome: Ongoing  Note:   The patient's ADLs were preformed by the patient as much as possible. The patient was allowed to remain at the highest level of independent function in anticipation of future discharge.

## 2019-03-31 NOTE — H&P
Department of Family Practice  Attending History and Physical        CHIEF COMPLAINT:  Decreased level of functioning    Reason for Admission:  Need for continued medical care and decisions on disposition    History Obtained From:  spouse, electronic medical record, reason patient could not give history:  Severity of the illness    HISTORY OF PRESENT ILLNESS:      The patient is a 80 y.o. male with significant past medical history of   Patient Active Problem List   Diagnosis Code    Bladder tumor D49.4    BPH (benign prostatic hyperplasia) N40.0    Melanoma ([address] C43.9    Cervical vertebral fusion M43.22    Spinal stenosis M48.00    CAD (coronary artery disease) I25.10    Hyperlipemia E78.5    Colon polyps K63.5    Sleep apnea G47.30    Syncope R55    Foot drop, left M21.372    Subdural hematoma ([address] S06.5X9A    Parkinson disease ([address] G20    History of malignant melanoma of skin Z85.820    Malignant neoplasm of overlapping sites of bladder ([address] C67.8    Syncope and collapse R55    Traumatic subdural hemorrhage without loss of consciousness ([address] S06.5X0A    Cerebral edema ([address] G93.6    Midline shift of brain due to hematoma G93.9    Anticoagulated Z79.01    Subdural hematoma due to concussion [facility]) S06.5X9A      who presents with a fall at home which resulted in a subdural hematoma. He went to surgery and post op was doing better but then worsened with declining level of alertness and ability to swallow and protect the airway. He had been on the inpatient rehab unit but is no longer able to participate in any therapies so is returning to the acute area for medical care while disposition is being determined.     Past Medical History:        Diagnosis Date    Ankle fracture 2/2016    left in cast    Arthritis     BPH (benign prostatic hyperplasia)     CAD (coronary artery disease)     margarette    Cancer of bladder, neck ([address]     Cervical vertebra fusion     Colon polyps 2010    abrahan  Foot drop, left 9/8/2015    GERD (gastroesophageal reflux disease)     Hyperlipemia     Macular degeneration     Melanoma (HCC)     Neck, Scalp    RISHABH on CPAP     Parkinson disease ([address] 2016    Presbycusis of both ears, uses hearing aids     Spinal stenosis     Subdural hematoma ([facility] 2015       resolved     Syncope     Traumatic subdural hemorrhage without loss of consciousness ([facility] 3/17/2019     Past Surgical History:        Procedure Laterality Date    BACK SURGERY      BLADDER SURGERY  5/31/11    BLADDER TUMOR EXCISION  2011    BLEPHAROPLASTY  2007, 2008    CARDIAC CATHETERIZATION  2009    moderate    margarette    CARDIAC CATHETERIZATION  11/2018    CATARACT REMOVAL Bilateral 1995    CERVICAL FUSION  2009    COLONOSCOPY  9-24-10      abrahan  wnl    CORONARY ANGIOPLASTY WITH STENT PLACEMENT  11/2018     [facility] eluting stent dr Taryn Arensa Left 3/17/2019    LEFT  FRONTOTEMPOROPARIETAL CRANIECTOMY WITH EVACUATION OF LEFT PANHEMISPHERIC ACUTE SUBDURAL HEMATOMA performed by Vianey Polanco MD at [address] [facility]  .6/03/2015    CYSTOSCOPY  08/2018    bladder tumor    [place]    DIAGNOSTIC CARDIAC CATH LAB PROCEDURE      EYE SURGERY      HEMORRHOID SURGERY  1994    FL CYSTOURETHROSCOPY,FULGUR .5-2CM LESN N/A 8/3/2018    CYSTOSCOPY TRANSURETHRAL RESECTION BLADDER TUMOR, SMALL performed by Javi Akbar MD at [address]      left rivera - Dr Mortimer Knights      neck and scalp melanoma, cheek basal cell    SKIN CANCER EXCISION      left river[place] - Dr Dung Connelly       Immunizations:              Influenza:  Up-to-date            Pneumococcal Polysaccharide:  Up-to-date; approximate date: 3/2015    Medications Prior to Admission:   Medications Prior to Admission: rOPINIRole (REQUIP) 0.5 MG tablet, Take 1 tablet by mouth 3 times daily  solifenacin (VESICARE) 5 MG tablet, TAKE 1 TABLET NIGHTLY  finasteride (PROSCAR) 5 MG tablet, TAKE 1 TABLET BY MOUTH EVERY DAY  folic acid (FOLVITE) 406 MCG tablet, Take 400 mcg by mouth daily  atorvastatin (LIPITOR) 10 MG tablet, TK 1 T PO QD  carbidopa-levodopa (SINEMET)  MG per tablet, Take 1.5 tablets by mouth 3 times daily  nitroGLYCERIN (NITROSTAT) 0.4 MG SL tablet, up to max of 3 total doses. If no relief after 1 dose, call 911. ticagrelor (BRILINTA) 90 MG TABS tablet, Take 1 tablet by mouth 2 times daily  omeprazole (PRILOSEC) 20 MG delayed release capsule, Take 1 capsule by mouth Daily  aspirin 81 MG EC tablet, Take 1 tablet by mouth daily  tamsulosin (FLOMAX) 0.4 MG capsule, TAKE 1 CAPSULE BY MOUTH EVERY NIGHT  amLODIPine (NORVASC) 2.5 MG tablet, Take 1 tablet by mouth daily  Coenzyme Q10 (COQ10) 400 MG CAPS, Take 1 capsule by mouth daily  Multiple Vitamins-Minerals (PRESERVISION/LUTEIN) CAPS, Take 1 capsule by mouth 2 times daily  cetirizine (ZYRTEC) 10 MG tablet, Take 10 mg by mouth daily.     fish oil-omega-3 fatty acids 1000 MG capsule, Take 1 g by mouth daily   Multiple Vitamin (MULTIVITAMIN PO),  Take 1 tablet by mouth daily     Allergies:  Sulfa antibiotics    Social History:   MARITAL STATUS:      Family History:       Problem Relation Age of Onset    Heart Disease Mother     Heart Surgery Mother     Heart Attack Father     Heart Disease Father 37        MI    Stroke Maternal Grandfather     Prostate Cancer Neg Hx     Cancer Neg Hx     Diabetes Neg Hx     High Blood Pressure Neg Hx      REVIEW OF SYSTEMS:  Review of systems not obtained due to patient factors - mental status  PHYSICAL EXAM:    Vitals:  BP (!) 145/93   Pulse 71   SpO2 98%     CONSTITUTIONAL:  awake, somnolent, cooperative and no apparent distress  EYES:  Membranes dry  ENT:  Protective claudy on  NECK:  supple, symmetrical, trachea midline  HEMATOLOGIC/LYMPHATICS:  no cervical lymphadenopathy and no supraclavicular lymphadenopathy  BACK:  symmetric  LUNGS:  No increased work of breathing, good air exchange, clear to auscultation bilaterally, no crackles or wheezing  CARDIOVASCULAR:  Normal apical impulse, regular rate and rhythm, normal S1 and S2, no S3 or S4, and no murmur noted  ABDOMEN:  normal bowel sounds and soft  CHEST/BREASTS:  symmetrical  MUSCULOSKELETAL:  No gross deformities  NEUROLOGIC:  Tries to cooperate but is nearly obtunded seeming  SKIN:  Atrophic with scattered resolving ecchymosis      ASSESSMENT AND PLAN:      Patient Active Hospital Problem List:   Subdural hematoma (HCC) ()    Assessment: decreasing level of functioning    Plan: the last CT was somewhat better, he became a DNR- CC today and consideration being given to hospice care

## 2019-03-31 NOTE — PROGRESS NOTES
PPN Follow Up Note    Assessment: Patient transferred to  from [facility],  Electrolyte Replacement: none    PPN changes for (today) at 1800:   None - continue with Clinimix 4.25/5 @ 70 ml/hr    Re-check BMP, Mg, PO4, iCa 4/1/2019

## 2019-03-31 NOTE — PLAN OF CARE
Problem: Safety:  Goal: Free from accidental physical injury  Description  Free from accidental physical injury  Outcome: Ongoing  Note:   All fall precautions in place. Bed in low position, alarm activated and appropriate use of call light. Live sitter present in room       Problem: Daily Care:  Goal: Daily care needs are met  Description  Daily care needs are met  Outcome: Ongoing  Note:   All needs addressed during hourly rounding. Problem: Skin Integrity:  Goal: Skin integrity will stabilize  Description  Skin integrity will stabilize  Outcome: Ongoing     Problem: Discharge Planning:  Goal: Patients continuum of care needs are met  Description  Patients continuum of care needs are met  Outcome: Ongoing  Note:   Home with hospice    Care plan reviewed with patient. Patient verbalizes understanding of the plan of care and contributes to goal setting.

## 2019-03-31 NOTE — PROGRESS NOTES
Patient: Hamlet Abdullahi  Unit/Bed: 5K-06/006-A  YOB: 1931  MRN: [id number] Acct: [de-identified]   Admitting Diagnosis: Subdural hematoma ([facility] [F14.8S9P]  Admit Date:  3/30/2019  Hospital Day: 1    Assessment:     Active Problems:    CAD (coronary artery disease)    Subdural hematoma (HCC)    Parkinson disease ([address]  Resolved Problems:    * No resolved hospital problems. *      Plan:     Staples and stitches out today  I discussed with his wife how Dr Marc Hernández is back tomorrow and he would likely discuss disposition planning with or without hospice        Subjective:     Patient seems comfortable up in the chair. Medication side effects: none    Scheduled Meds:   bacitracin   Topical TID    ampicillin-sulbactam  3 g Intravenous Q8H    levetiracetam  500 mg Intravenous Q12H    miconazole   Topical BID     Continuous Infusions:   CLINIMIX 4.25/5      sodium chloride 50 mL/hr at 03/30/19 2149    CLINIMIX 4.25/5 2,000 mL (03/30/19 2149)     PRN Meds:diphenhydrAMINE, acetaminophen, albuterol    Review of Systems  Pertinent items are noted in HPI. Objective:     Patient Vitals for the past 8 hrs:   BP Temp Temp src Pulse Resp SpO2 Height Weight   03/31/19 0715 136/86 98.4 °F (36.9 °C) Axillary 82 16 98 % -- --   03/31/19 0615 -- -- -- -- -- -- 5' 10\" (1.778 m) 127 lb 3.3 oz (57.7 kg)   03/31/19 0551 (!) 146/73 -- -- 71 16 96 % -- --     I/O last 3 completed shifts: In: 3352.8 [I.V.:3352.8]  Out: -   I/O this shift:   In: 875.3 [I.V.:875.3]  Out: -     /86   Pulse 82   Temp 98.4 °F (36.9 °C) (Axillary)   Resp 16   Ht 5' 10\" (1.778 m)   Wt 127 lb 3.3 oz (57.7 kg)   SpO2 98%   BMI 18.25 kg/m²     /86   Pulse 82   Temp 98.4 °F (36.9 °C) (Axillary)   Resp 16   Ht 5' 10\" (1.778 m)   Wt 127 lb 3.3 oz (57.7 kg)   SpO2 98%   BMI 18.25 kg/m²   General appearance: appears stated age, cooperative, fatigued and no distress  Head: the areas of the sutures in the area of the left supraorbital ridge and the staples look well  Lungs: clear to auscultation bilaterally  Heart: regular rate and rhythm, S1, S2 normal, no murmur, click, rub or gallop  Abdomen: soft, non-tender; bowel sounds normal; no masses,  no organomegaly  Extremities: extremities normal, atraumatic, no cyanosis or edema  Skin: Skin color, texture, turgor normal. No rashes or lesions  Neurologic: weak and unable to ambulate even with max assist    Data Review:   Results for Jade Meals (MRN [id number]) as of 3/31/2019 12:34   Ref.  Range 3/30/2019 04:16 3/30/2019 07:07 3/30/2019 07:17 3/30/2019 16:41 3/30/2019 16:58   Sodium Latest Ref Range: 135 - 145 meq/L 138       Potassium Latest Ref Range: 3.5 - 5.2 meq/L 4.0       Chloride Latest Ref Range: 98 - 111 meq/L 101       CO2 Latest Ref Range: 23 - 33 meq/L 25       BUN Latest Ref Range: 7 - 22 mg/dL 16       Creatinine Latest Ref Range: 0.4 - 1.2 mg/dL 0.6       Anion Gap Latest Ref Range: 8.0 - 16.0 meq/L 12.0       Calcium, Ion Latest Ref Range: 1.12 - 1.32 mmol/L 1.00 (L) 1.03 (L)   1.12   Est, Glom Filt Rate Latest Units: ml/min/1.73m2 >90       Magnesium Latest Ref Range: 1.6 - 2.4 mg/dL 2.0       Glucose Latest Ref Range: 70 - 108 mg/dL 86       POC Glucose Latest Ref Range: 70 - 108 mg/dl   105 132 (H)    Calcium Latest Ref Range: 8.5 - 10.5 mg/dL 8.2 (L)       Phosphorus Latest Ref Range: 2.4 - 4.7 mg/dL 2.7           Electronically signed by Kristina Troy MD on 3/31/2019 at 12:29 PM

## 2019-04-01 NOTE — PROGRESS NOTES
[facility] Laboratory Technician Worksheet      EEG Date: 2019    Name: Bennie Wilcox   : 1931   Age: 80 y.o. SEX: male    ROOM: [facility] MRN: [id number]           CSN: [id number]      Ordering Provider: RASHAWN THAYER Number: [id number] Time of Test:  7010    Hand: Right   Sedation: no    H.V. Done: No NOT DONE Photic: No RESTLESS    Sleep: Yes  Drowsy: Yes   Sleep Deprived: No    Seizures observed: no    Technician Impression:3    Mentality: RESTLESS    Clinical History:3/17 FELL HIT HEAD, LEFT SUBDURAL HEMATOMA, LEFT BONE FLAP OUT, METABOLIC ENCEPHALOPATHY  CT   . Redemonstration of left frontoparietal craniectomy with stable left subdural hematoma measuring 4 mm in greatest thickness. 2. Stable right frontal hypodensity.    3. No new/interval abnormality identified       Past Medical History:       Diagnosis Date    Ankle fracture 2016    left in cast    Arthritis     BPH (benign prostatic hyperplasia)     CAD (coronary artery disease)     margarette    Cancer of bladder, neck (HCC)     Cervical vertebra fusion     Colon polyps     abrahan    Foot drop, left 2015    GERD (gastroesophageal reflux disease)     Hyperlipemia     Macular degeneration     Melanoma (HCC)     Neck, Scalp    RISHABH on CPAP     Parkinson disease ([address]     Presbycusis of both ears, uses hearing aids     Spinal stenosis     Subdural hematoma ([address]        resolved     Syncope     Traumatic subdural hemorrhage without loss of consciousness ([address] 3/17/2019       Scheduled Meds:   cefTRIAXone (ROCEPHIN) IV  1 g Intravenous Q12H    potassium replacement protocol   Other RX Placeholder    calcium replacement protocol   Other RX Placeholder    phosphorus replacement protocol   Other RX Placeholder    magnesium replacement protocol   Other RX Placeholder    potassium chloride  10 mEq Intravenous Q1H    [START ON 2019] insulin lispro  0-12 Units Subcutaneous Q6H    potassium phosphate IVPB  9 mmol Intravenous Once    bacitracin   Topical TID    levetiracetam  500 mg Intravenous Q12H    miconazole   Topical BID     Continuous Infusions:   [START ON 4/2/2019] dextrose      PN-Adult  3 IN 1 Central Line (Custom)      CLINIMIX 4.25/5 2,000 mL (03/31/19 1851)    sodium chloride 50 mL/hr at 03/31/19 1659     PRN Meds:.[START ON 4/2/2019] glucose, [START ON 4/2/2019] dextrose, [START ON 4/2/2019] glucagon (rDNA), [START ON 4/2/2019] dextrose, diphenhydrAMINE, acetaminophen, albuterol    Technician: Max Patrick 4/1/2019

## 2019-04-01 NOTE — PROGRESS NOTES
Heart: Clear and regular S1 and S2 heart sounds bilaterally. No murmurs noted. Lungs: Anterior, posterior, and lateral sounds clear bilaterally. Nonpitting edema present in left hand.

## 2019-04-01 NOTE — FLOWSHEET NOTE
04/01/19 1050   Encounter Summary   Services provided to: Patient and family together   Referral/Consult From: [facility] [address]; Family members; Gnosticist/esmer community   Place of Synagogue   ([facility]   Contact Gnosticist Completed   Complexity of Encounter Moderate   Length of Encounter 15 minutes   Spiritual/Yarsanism   Type Spiritual support   Assessment Calm; Approachable;Tearful   Intervention Active listening;Nurtured hope   Outcome Comfort;Engaged in conversation   During my contact with the (Palliative Care) patient, the pt's wife and friend was supportively sitting at his bedside. The pt was none responsive, and the pt's wife shared how difficult it has been seeing her  in this condition. The pt's wife shared that they have great support through their [address] I offered words of encouragement and comfort to the pt. Plan: The pt is being followed by Palliative Care and spiritual support would be helpful.

## 2019-04-01 NOTE — PROGRESS NOTES
[address] [place] [facility]  [address]    TIME    NO therapy provided this date     []Daily Note  []Progress Note  [x]Discharge Note    Date: 2019  Patient Name: Ana Gonzalez        MRN: [id number]    : 1931  (80 y.o.)  Gender: male   Primary Provider: Dandy Castillo MD  Admitting Diagnosis: Subdural hematoma d/t concussion without LOC  Secondary Diagnosis: Dysphagia; cognitive-linguistic deficits   Precautions: Fall risk, aspiration precautions   Swallowing Status/Diet: NPO  Swallowing Strategies: Strict oral care protocol every 2 hours   DATE of last MBS: 3/28  Pain: No pain evidenced this date    Pt with unanticipated discharge to acute care setting. No therapy provided. This note to serve as discharge summary only. SHORT TERM GOAL #1:  Goal 1: Complete repeat BSE as appropriate and determine appropriateness to initiate PO diet and/or pt appropriateness for instrumental assessment. GOAL NOT MET   INTERVENTIONS: PO trials of nectar liquids by spoon x4. Pt with no attempts to grab spoon when presented with utensil by ST. ST manipulating pt's hand to further maximize functional self feeding; however, pt again with no awareness/initiation d/t severe cognitive decline. ST required to provide feeding assistance this date. Pt with improved elicitation of labial rounding. Decreased strength with impaired extraction of bolus off of spoon. Pt with decreased bolus formation with spillage of bolus to anterior sulcus resulting in mod stasis. Initiation of oral bolus manipulation followed by oral holding with pt need for MAX cues to elicit a swallow response. Elicitation of pharyngeal swallow following 5+ seconds. Concerns for decreased laryngeal elevation and decreased pharyngeal constriction with suspected high levels of pharyngeal residuals. Certainly cannot determine without instrumental assessment. Pt with presence of coughing to follow. Recommendation to remain NPO with consideration for alternative means of nutrition and strict oral care protocol. Completion of rigorous oral care completed with oral swabs in room prior to and following po trials. Moderate quantatiy of thick mucus removed from oral cavity. SHORT TERM GOAL #2:  Goal 2: Pt will follow basic 1-step commands and answer yes/no questions with 50% accuracy given max cues to improve ability to convey wants/needs. GOAL NOT MET   INTERVENTIONS: Multiple attempts to elicit yes/no response and 1 step commands. Verbal and tactile max cues provided for 1 step commands. 0/20 1 step commands. No response to basic yes/no questions x15 attempts. SHORT TERM GOAL #3:  Goal 3: Pt will complete automatic speech, repetition, and confrontational naming tasks with 50% accuracy given max cues to improve verbal expression. GOAL NOT MET   INTERVENTIONS: No verbal responses throughout this session. SHORT TERM GOAL #4:  Goal 4: Pt will ID objects/pictures in F02 with 50% accuracy given max cues to permit potential introduction of alternative communication needs. GOAL NOT MET   INTERVENTIONS: DNT d/t focus on other goals     SHORT TERM GOAL #5:  Goal 5: Pt will complete BASIC orientation tasks with 50% accuracy given max cues to improve awareness to immediate surroundings. GOAL NOT MET   INTERVENTIONS: DNT d/t focus on other goals    Long-term Goals  Timeframe for Long-term Goals: 6 weeks  Goal 1: Pt will safely tolerate dysphagia I diet with thin liquids 98% of the time given compensatory swallowing strategies to ensure safe and adequate nutrition/hydration measures. GOAL NOT MET   Goal 2: Pt will improve expressive and receptive language skills to a mild impairment level or higher to permit improved ability to improve functional communication for wants/needs.  GOAL NOT MET       COMMUNICATION  Comprehension: 1 - Patient understands basic needs <25% of the time  Expression: 1 - Expresses basic ideas/needs < 25% of the time  SOCIAL COGNITION  Social Interaction: 1 - Patient appropriate < 25% of the time  Problem Solvin - Patient solves simple/routine tasks < 25%  Memory: 1 - Patient remembers < 25% of the time      ASSESSMENT:  Assessment: []Progressing towards goals          [x]Not Progressing towards goals   SUMMARY: Pt has not met any of above 5/5 STG's and/or any of above 2/2 LTG's with unanticipated discharge to acute care setting. Pt presenting with profound expressive/receptive language deficits evidenced by impairments within basic direction following of 1 step commands, answering basic yes/no questions as well as identifying objects/words in a FO2. Pt with significantly declined level of verbal output with pt essentially being nonverbal throughout last several treatment sessions. Poor ability to indicate pain, express basic wants/needs and actively participate within therapeutic sessions. Pt with poor level of success with completion of automatic speech tasks, word repetition, functional naming and expression of basic biographical information. Unable to assess and determine level of cognitive impairments d/t severity of linguistic deficits outlined above. Pt presenting with severe-profound oropharyngeal dysphagia that appears highly influenced by current mental status, lethargy, poor direction following and concerns for muscle weakness/dysfunction. Unable to complete instrumental assessment at this time d/t extent of impairments, poor direction following and high levels of fatigue despite attempts for completion of MBS. Pt demonstration of poor extraction of bolus off spoon presentations, presence of oral holding and delayed swallow trigger with additional concerns for decreased laryngeal elevation and pharyngeal constriction with completion of manual palpation, evidence of repeat swallows and severe muscle weakness suspected. Ongoing recommendations for NPO.  Pt would benefit from continued ST intervention when medically appropriate in order to progress towards PO means of nutrition and improve linguistic skills to a moderate severity level of function for maximized quality of life/pt satisfaction, reduced risks for further medical decline and improved ability to participate in ADL tasks and voice basic wants/needs. Plan: []Continue with current plan of care    []Modify current plan of care as follows:    [x]Discharge patient    Discharge Location: acute care setting     Services/Supervision Recommended: ST follow up when medically appropriate     [x]Patient continues to require treatment by a licensed therapist to address functional deficits as outlined in the established plan of care.     Nat Siemens, M.S. Beryle Pelton 4/1/2019

## 2019-04-01 NOTE — CARE COORDINATION
4/1/19, 7:32 AM      Yadiel Zavala       Admitted from: Direct admit from acute rehab. History of subdural bleed with evacuation. 3/30/2019/ 22 Pollen [place] day: 2   Location: -06/006-A Reason for admit: Subdural hematoma ([address] [S06.5X9A] Status: Inpt. Admit order signed?: yes  PMH:  has a past medical history of Ankle fracture, Arthritis, BPH (benign prostatic hyperplasia), CAD (coronary artery disease), Cancer of bladder, neck ([address] Cervical vertebra fusion, Colon polyps, Foot drop, left, GERD (gastroesophageal reflux disease), Hyperlipemia, Macular degeneration, Melanoma ([address] RISHABH on CPAP, Parkinson disease ([address] Presbycusis of both ears, uses hearing aids, Spinal stenosis, Subdural hematoma ([address] Syncope, and Traumatic subdural hemorrhage without loss of consciousness ([address] Procedure: PRIOR TO ADMISSION:  3/17 Intubated for surgery  3/18 LEFT FRONTOTEMPOROPARIETAL CRANIECTOMY WITH EVACUATION OF LEFT PANHEMISPHERIC ACUTE SUBDURAL HEMATOMA   Pertinent abnormal Imaging:Chest x-ray. Medications:  Scheduled Meds:   bacitracin   Topical TID    ampicillin-sulbactam  3 g Intravenous Q8H    levetiracetam  500 mg Intravenous Q12H    miconazole   Topical BID     Continuous Infusions:   CLINIMIX 4.25/5 2,000 mL (03/31/19 1851)    sodium chloride 50 mL/hr at 03/31/19 1659      Pertinent Info/Orders/Treatment Plan:  Consults to Neurosurgery, Neurology, SS, Palliative Care, Spiritual Care, PPN per pharmacy dosing, Potassium replacement protocol, repeat CT of head, EEG,ST evaluation, Aspiration precautions, SCD's, up with assistance, [facility]. Diet: Diet NPO Effective Now   Smoking status:  reports that he quit smoking about 40 years ago. He has never used smokeless tobacco.   PCP: John Wheat MD  Readmission: Yes  Patient has been readmitted within 4 days. Patient went to f/u appointment? N/A-he was discharged to acute rehab. If yes, was it within 7 days? N/A  Patient was able to fill prescriptions? N/A  Patient is taking medications as prescribed? Yes  Cause for readmission? Decline in health  Readmission Risk Score: 22%  Discharge Planning  Current Residence:  Independent Living  Living Arrangements:  Spouse/Significant Other   Support Systems:  Spouse/Significant Other  Current Services PTA:     Potential Assistance Needed:  [facility]  Potential Assistance Purchasing Medications:  No  Does patient want to participate in local refill/ meds to beds program?  No  Type of Home Care Services:  None  Patient expects to be discharged to:  N/A at this time. Expected Discharge date:  04/02/19  Follow Up Appointment: Best Day/ Time: Monday AM  Discharge Plan: SNF placement vs. HH.     Evaluation: yes

## 2019-04-01 NOTE — PROGRESS NOTES
0945: Pt resting in bed and is working with speech therapy. Pt does not verbalize much. Pt does not appear to be in any acute distress at this time of visit. Wife states that pt has bouts of confusion, \"at times I think he may know me and at others I am not sure he does. \"   We move to conference area and discuss goals of care for pt. Wife states that she spoke with Dr. Abarca Risk this am and is waiting for Dr. Pardeep Castaneda. We review pt status and goals of care, we also discuss options for treatment. Wife states, \"Several of the children were here and available by phone and we made him a DNR over the weekend. \"   Wife states that she is waiting on input from Dr. Pardeep Castaneda. We  review pt fragile state and concern if he can tolerate any major interventions/treatments if he would need them . Wife tearful and states that she is concerned that some of children may voice concern of feeding pt vs \"letting pt starve to death. \" Review benefits and risks of NG/PEG tube and continued high risks of aspiration of saliva. Also discuss comfort of being able to take in small amts of oral intake and potential for increased risk of aspiration during this. Speech therapy present and update pt wife on recommendation for continued NPO due to swallow function. Wife states, \" I know he has a living will, but I can't remember how he marked it. \" Pulled up LW and DPOA on computer and reviewed pt expressed request on LW. Much emotional support given and Alex Bowen and Tre OJEDA updated. Pt wife also offers that she will be unable to take care of pt at home. She states that they were in the process of moving to [facility] in June. She states that she would want pt at [facility] as she is still planning to move there. She states she will update Cheryl representative today when she visits. Temple member with wife at this time. 1400: Attempted to visit with wife and pt is currently having EEG.  Will visit tomorrow.

## 2019-04-01 NOTE — PLAN OF CARE
Problem: DISCHARGE BARRIERS  Goal: Patient's continuum of care needs are met  Outcome: Ongoing  Note:   ECF for rehab. See SW progress notes.

## 2019-04-01 NOTE — PROGRESS NOTES
Impaired [x]DNT    Vocal Quality []WFL [] Impaired [x]DNT Nonverbal, no attempt to vocalize   Sensation []WFL [x] Impaired []DNT    Cough []WFL [x] Impaired []DNT Weak, nonproductive   Other: []WFL [] Impaired []DNT    Other: []WFL [] Impaired []DNT        PATIENT WAS EVALUATED USING:  Applesauce 2x and nectar liquids by spoon 3x    ORAL PHASE: [] WFL  [x] Impaired   [x] Loss of bolus from lips [x] Impaired AP movement [] Pocketing Left   [] Pocketing Right  [] Lingual Pumping  [] Impaired Mastication   [x] Reduced Bolus Formation [x] Impaired Oral Initiation    [] OTHER:    PHARYNGEAL PHASE: [] WFL: Pharyngeal phase appears WFLs, but cannot completely rule out pharyngeal phase deficits from a bedside swallow evaluation alone. [x] Impaired   [x] Delayed Swallow  [x] Decreased Hyolaryngeal Elevation  [] Audible Swallow   [x] Suspected Pharyngeal Residue due to spontaneous multiple swallow. [] OTHER:    SIGNS AND SYMPTOMS OF LARYNGEAL PENETRATION / ASPIRATION:  [] NO sign/symptoms of aspiration evident with this evaluation, but cannot rule out silent aspiration. [x] Throat Clear  [] Immediate Cough [x] Delayed Cough [] Wet Vocal Quality  [] Change in Pulmonary Status  [] OTHER:    IMPRESSIONS: Pt presents with severe oropharyngeal dysphagia with overall poor medical status and weakened state significantly impacting pt's ability to control bolus and protect the airway. Trials of applesauce and nectar liquids were both presented by spoon with impaired oral bolus formation and delayed posterior bolus transit. Minimal laryngeal elevation was observed once pt able to trigger pharyngeal swallow. Following each po trial, pt exhibited delayed weak cough/throat clear. Suspect pharyngeal residue with eventual penetretion/aspiration of this residue.   Weak cough/throat clear demonstrates pt's inability to protect airway and clear likely aspirated material.  Recommend pt remain NPO due to ongoing high aspiration ongoing family education regarding swallowing status to assist with POC      LONG TERM GOALS:  No LTG due to short ELOS       Baby Ariella [id number]

## 2019-04-01 NOTE — PROGRESS NOTES
Patient has no verbalization, but nods to verbal commands. A & O to name only. Head/Neck: Head is not shirley cephalic with an indentation on the left side. Carotid artery 2+ bilaterally. Eyes: Sclera white, conjunctiva pink and moist, and cornea clear bilaterally. No direct and consensual light reflex bilaterally. Mouth: Lips are dry. Buccul mucosa is dry and cracked along the gum line on the hard and soft palate. Heart: Clear and regular S1 and S2 sounds bilaterally. No murmurs noted. Lungs: Anterior, posterior, and lateral lungs sounds clear bilaterally. Abdomen: Contour is flat. Bowel sounds active in all four quadrants. Extremities: Patient unable to perform hand grasp bilaterally. Patient unable to perform pedal push and pull bilaterally. Radial pulse 2+ bilaterally. Cap refill of upper and lower extremities is less than 3 seconds bilaterally. Good turgor, no tenting at the wrist bilaterally. Posterior tibial pulse 1+ bilaterally. Dorsalis pedis pulse 1+ bilaterally. Skin: Spot-like erythematous rash covers the patient's back. Redness present in the groin area. Nonpitting edema present in left hand with red bruising present on anterior side.

## 2019-04-01 NOTE — PROGRESS NOTES
Pharmacy Consult       PPN  Macronutrients   DW: 57.7 kg   AA: 1 g/kg   Total Kcal: 10 Kcal/kg   Lipids: 30 % of Total Kcal   Infusion Rate: 70 mL/hr    Electrolytes (per bag)   NaCl:         150 meq   NaPhos:       12 mmol     K Acetate:     60 meq     Calcium Gluc:   4.65 meq   Magnesium:      8.12 meq      MV:      10 mL    Electrolyte Replacement:   60 mEq potassium chloride  9 mmol potassium phosphate  1 gram calcium gluconate    Assessment/ Plan:  Switching to 3-in-1 PPN today. Macronutrient recommendations per dietary. Will continue to follow. BMP, mag, phosph, and ical ordered to be rechecked tomorrow (4/2/2019).     Yousif FisherD, BCPS  4/1/2019  10:40 AM

## 2019-04-01 NOTE — FLOWSHEET NOTE
[address]  DISCHARGE NOTE    Date: 2019  Patient Name: Vin Ortega,   Gender: male      MRN: [id number]  : 1931  (80 y.o.)  Referring Practitioner: Dr. Rahul Nieto  Diagnosis: subdural hematoma due to concussion without loss of consciousness  Additional Pertinent Hx: He was admitted 3/17/2019 as a level III trauma after a fall at home where he struck the LEFT side of his head on a nightstand. Per report the patient had switched sides of the bed with his wife; she was scheduled to have lumbar back surgery soon and sleeping on his side of the bed was easier for her. His neurological examination was normal with a GCS of 15 and his CT of the head did show a subdural hematoma over the LEFT hemisphere with a 5 mm LEFT to RIGHT shift. Neurosurgery was consulted and requested a 6 hour follow-up image which showed a significant increase in the size of the hematoma with marked mass effect and a 14 mm midline shift. The patient was taken emergently to the operating room by Dr. Lay Frederick for a craniectomy with evacuation of the LEFT subdural hematoma blood products on 3/17/2019. Since this procedure the patient has been somewhat more confused, has some noted hypoxia with a 3 L oxygen requirement via nasal cannula, a postoperative blood loss anemia with hemoglobin decreased 3 units and requiring 2 units of platelets to be transfused. The patient was on a daily inpatient rehabilitation unit in  after sustaining a subarachnoid hemorrhage after a fall with a brief loss of consciousness. Since that admission he has been diagnosed with Parkinson disease in 2016 and is followed by Dr. Christi Woods.    Restrictions/Precautions:  Restrictions/Precautions: Fall Risk, General Precautions            Position Activity Restriction  Other position/activity restrictions: Protective helmet on when up. OK to remove in supine only. Past Medical History:   Diagnosis Date    Ankle fracture 2/2016    left in cast    Arthritis     BPH (benign prostatic hyperplasia)     CAD (coronary artery disease)     margarette    Cancer of bladder, neck (HCC)     Cervical vertebra fusion     Colon polyps 2010    [facility]    Foot drop, left 9/8/2015    GERD (gastroesophageal reflux disease)     Hyperlipemia     Macular degeneration     Melanoma ([address]     Neck, Scalp    RISHABH on CPAP     Parkinson disease ([address] 2016    Presbycusis of both ears, uses hearing aids     Spinal stenosis     Subdural hematoma ([address] 2015       resolved     Syncope     Traumatic subdural hemorrhage without loss of consciousness ([address] 3/17/2019     Past Surgical History:   Procedure Laterality Date    BACK SURGERY      BLADDER SURGERY  5/31/11    BLADDER TUMOR EXCISION  2011    BLEPHAROPLASTY  2007, 2008    CARDIAC CATHETERIZATION  2009    moderate    margarette    CARDIAC CATHETERIZATION  11/2018    CATARACT REMOVAL Bilateral 1995    CERVICAL FUSION  2009    COLONOSCOPY  9-24-10      [facility]  wnl    CORONARY ANGIOPLASTY WITH STENT PLACEMENT  11/2018     [facility] eluting stent dr Bonnie Chase Left 3/17/2019    LEFT  FRONTOTEMPOROPARIETAL CRANIECTOMY WITH EVACUATION OF LEFT PANHEMISPHERIC ACUTE SUBDURAL HEMATOMA performed by Jeanie Baez MD at [facility] 9  .6/03/2015    CYSTOSCOPY  08/2018    bladder tumor    [place]    DIAGNOSTIC CARDIAC CATH LAB PROCEDURE      EYE SURGERY      HEMORRHOID SURGERY  1994    MT CYSTOURETHROSCOPY,FULGUR .5-2CM LESN N/A 8/3/2018    CYSTOSCOPY TRANSURETHRAL RESECTION BLADDER TUMOR, SMALL performed by Levi Licona MD at [address]      left nicole Nugent      neck and scalp melanoma, cheek basal cell    SKIN CANCER EXCISION      left nicole - Dr Mery Odom discharged to acute care due to unexpected medical decline     OT FIM ASSESSMENT:     Admission score Current score Goal Goal Status   EATING 3 - Unable to put food on utensil(PT able to feed himself half of his meal. Spouse assisted with 50% of meal.) 3 - Unable to put food on utensil(PT able to feed himself half of his meal. Spouse assisted with 50% of meal.) 5 Not Met    GROOMING 2 - Able to perform 1-2 tasks (max assist)(Pt perseverative on washing face as therapist asked to continue to wash additional body parts. min A for washing hands .) 1 - Requires assistance with all tasks(A for applying dentures in mouth, difficulty following commands for task) 5 Not Met     BATHING 1 - Able to bathe 2 or less areas/total assist(max A for hand over hand and initiation to wash each body part. Assit to wash B underarms, B LEs and bottom. second person needed for standing to wash bottom. max cues for continuation of task. mod increased time needed for processing speed throughout. ) 1 - Able to bathe 2 or less areas/total assist(patient required hand over hand motion, difficulty following commands, completed sitting on toilet\) 4 Not Met     UPPER EXTREMITY DRESSING 1 - Requires assist with 4 tasks/total assist(mod A for initiation of task, max A for pulling shirt over arms, mod A for overhead and mod A for pulling down in the morena k. ) (hospital shirt donned by stepping in to shirt, completed sitting on toilet) 4 Not Met     LOWER EXTREMITY DRESSING 1 - Total assist with lower body dressing(max A for pants over feet, min A standing balnace x 1, CGA of another for safety, max A for clothing mgt up. ) 1 - Total assist with lower body dressing(did not follow commands to lift feet, donned sitting on toilet, difficulty following commands) 4 Not Met     TOILETING 1 - Total assist(assist for clothing mgt up and down. assit to wipe. Pt declined to wipe for toileting tasks.  ) 1 - Total assist(A for clothing management and for hygiene) 4 Not Met     TOILET TRANSFER 3 - Requires 25-49% assist getting off toilet(min A sit to stand from a std height toilet.) 2 - Requires 50-74% assist getting off toilet(STS and used grab bar) 4 Not Met     TUB/SHOWER TRANSFER                     4 Not Met       Assessment:  Assessment: Pt has had unexpected decline in medical status and was discharged to acute care. He has not met any goals due to unexpected decline. Pt demo deficits in right UE and need for increased assistance during ADL task. He currntly needs max cueing for initiation and problem solving as well as processing speed for basic care tasks. . Pt demo decreased standing balance during ADL tasks. Equipment Recommendations: Other: Pt has a shower chair, grab bars in BR and shower. Plan:  Discharge patient to acute care    Goals:  Short term goals  Time Frame for Short term goals: 1 week  Short term goal 1: Pt will initiate ADL routine with no > mod cues for attention to task and continueation of task. to increase initiation into daily activities.   NOT MET  Short term goal 2: PT will complete functional ambulation to and from the BR with no > min A x 1 and RW with no > Mod cues for R LE placement and awareness to increase ability tc complete toileting routine NOT MET  Short term goal 3: Pt will incorporate R hand into daily dressing tasks including opening the hand to grasp for items with no > mod cues for increased independence in grooming tasks NOT MET  Short term goal 4: Pt to demo dynamic standing balance > 2 min with 0-1 UE support when reaching outside base of support with CGA in prep for completing clothing management after toileting and sinkside ADL tasks  NOT MET     Long term goals  Time Frame for Long term goals : 4-5 weeks  Long term goal 1: Pt will complete ADL routine with no > cues for initiation to task and problem solving tasks to increase independence in self care tasks NOT MET  Long term goal 2: PT will complete functional ambulation to and from the BR as well as around obstacles with SBA and no > min cues for attention to safe mobiltiy to increase independence in self care tasks NOT MET  Long term goal 3: Pt will follow 1 step commands for basic homemaking tasks with no > min cues for attention to task to increase independence in retrieving a snack.   NOT MET

## 2019-04-01 NOTE — PROGRESS NOTES
[address] [address]  Recreational Therapy  Discharge Note  Inpatient Rehabilitation Unit           Date:  4/1/2019       Patient Name: Debbie Rankin      MRN: [id number]       YOB: 1931 (80 y.o.)       Gender: male  Diagnosis: subdural hematoma due to concussion without loss of consciousness  Referring Practitioner: Dr. Mani Reynolds    Patient discharged from Recreational Therapy at this time. See recreational therapy notes for details.     Electronically signed by: ANA Braun  Date: 4/1/2019

## 2019-04-01 NOTE — CARE COORDINATION
4/1/19, 2:46 PM    DISCHARGE BARRIERS    SW consult for \"home care needs\". SW spoke with patient's spouse as patient was resting. Discussed conversation earlier with PC, reviewed options for patient to go to [facility] with rehab v with hospice and coverage. Spouse agreeable to referral to [facility] for rehab at this time.  SW spoke with Suzanne at [facility] and completed referral.

## 2019-04-01 NOTE — PROGRESS NOTES
Nutrition Assessment (Parenteral Nutrition)    Type and Reason for Visit: Consult(TPN)    Nutrition Recommendations:   Continue NPO per Speech Therapist  Would recommend that pt's 3 - in - 1 (parental nutrition) for tonight would start at 10 kcal/kgm, 1.0 gram/kgm and 30% from lipid kcal with dosing weight of 57.7 kgm. Monitor labs (K+ 3.0; Phos 2.3). Adjust TPN accordingly. Await decisions about discharge and possible hospice    Nutrition Assessment: Received consult to start TPN. Clinimax 4.25/5 was ran over the weekend. Pt continues to decline from a nutritional standpoint AEB by NPO status per SLP on 3/28/19, mental status changes, medical status decline, possible hospice noted. Pt remains at risk for further nutritional compromise r/t ongoing poor nutritional status, ongoing AMS/agitation, and need for nutrition support. Labs: Low Potassium 3.0; Phos 2.3  Medications: IVF, Folic Acid, IV Unasyn, Keppra. Malnutrition Assessment:  · Malnutrition Status:   At Risk for Malnutrition    Nutrition Risk Level: High    Nutrient Needs:  · Estimated Daily Total Kcal:  1680 - 1960 kcal/day (30-35 kcal/kgm on 4/1)  · Estimated Daily Protein (g):  84+ grams of protein/day (1.5 g/kgm of 57 kgm on 4/1)    Nutrition Diagnosis:   · Problem:  Inadequate oral intake   · Etiology: related to  related to Insufficient energy/nutrient consumption   Signs and symptoms:  as evidenced by  by NPO status due to medical condition, nutrition support - PN    Objective Information:  · Nutrition-Focused Physical Findings: SLP recommended NPO on 3/28 until mental status improved; hospice being considered at this time; +AMS/lethargy; last BM was 3/31  · Wound Type:  Surgical Wound (face + head staples)  · Current Nutrition Therapies:  · Oral Diet Orders: NPO   · Parenteral Nutrition Orders:  · Type and Formula: 3-in-1 Standard   · Lipids:  None  · Rate/Volume:  per pharmacy  · Duration: Continuous  · Tonight's TPN: 10 kcal/kgm, 1.0 gram

## 2019-04-02 PROBLEM — E43 SEVERE MALNUTRITION (HCC): Status: ACTIVE | Noted: 2019-01-01

## 2019-04-02 NOTE — PROGRESS NOTES
6008: Pt resting in bed with eyes closed and does not appear to be in any acute distress. Pt has restless movements at intervals. Sitter remains at bedside. Reviewed pt status and plan of care with wife Orly Mccollum at bedside. She states, \"yesterday afternoon he seemed to be more alert. He called me by name and when I left he asked where I was going. \" We review that alertness will wax and wane. Wife also voiced concern if therapy will be working with pt and states she has questions for the . States she was overwhelmed yesterday and does not remember much of conversation. Spoke briefly with Ivania OJEDA and she will visit with wife later today and address questions. We also briefly review potential need for hospice/comfort care if pt does not improve with swallowing or is unable to work with therapy. Wife also has questions about PPN in ECF and LYNETTE will check on this. Emotional support given.

## 2019-04-02 NOTE — PROGRESS NOTES
PPN Follow Up Note    Assessment: NPO- high aspiration risk. Electrolyte Replacement: none    PPN changes for (today) at 1800: No changes in electrolytes today.  See Dietician macronutrient changes for peripheral PN    Re-check BMP, Mg, PO4, iCa 04/03/19    Trung Pardo PharmD 4/2/2019 12:41 PM

## 2019-04-02 NOTE — PLAN OF CARE
Problem: Nutrition  Goal: Optimal nutrition therapy  Outcome: Ongoing   Nutrition Problem: Severe malnutrition, In context of acute illness or injury  Intervention: Food and/or Nutrient Delivery: Continue NPO, Modify current Parenteral Nutrition  Nutritional Goals: Pt will tolerate adequate nutrition support to meet 75% or more of estimated nutritional needs during LOS until appropriate to transition to PO feeds

## 2019-04-02 NOTE — FLOWSHEET NOTE
04/02/19 1414   Encounter Summary   Services provided to: Family   Referral/Consult From: [address]; Family members   Place of Orthod[facility]   ([facility])   Contact Nondenominational Completed   Continue Visiting Yes  (4/2/19)   Complexity of Encounter Moderate   Length of Encounter 15 minutes   Spiritual/Caodaism   Type Spiritual support   Assessment Approachable;Tearful   Intervention Active listening;Nurtured hope   Outcome Comfort;Expressed gratitude   During my contact with the patient (he was not responsive) but I spoke with the pt's wife. The pt attends [facility]. The pt's  was also supportively present. The pt's spouse stated that things aren't going well. The pt assured the pt that she will receive support as she makes these difficult choices. I offered emotional support and words of comfort. Plan: Continued support would be helpful.

## 2019-04-02 NOTE — PROGRESS NOTES
Nutrition Assessment (Parenteral Nutrition)    Type and Reason for Visit: Reassess(PPN Monitor)    Nutrition Recommendations: Recommend increasing 3-in-1 PPN to 15 kcal/kg, 1.2 g/kg protein, and 30% lipid kcals based on dosing weight of 56 kg. *NPO per SLP     Nutrition Assessment: Pt slightly improving from a nutritional standpoint AEB pt tolerating 3-in-1 PPN last night- however now meets criteria for severe malnutrition AEB severe muscle loss, moderate fat loss, and meeting 50% or less of energy intake over the past 5 days. Remains at risk for further nutritional compromise r/t NPO per SLP requiring need for PPN for nutrition, ongoing poor alertness/AMS, and need for nutrition support. NPO per SLP. Labs: Phos 2.9, Mg 1.8, K+ 3.8. Recommend increasing 3-in-1 PPN to 15 kcal/kg, 1.2 g/kg protein, and 30% lipid kcals based on dosing weight of 56 kg. Malnutrition Assessment:  · Malnutrition Status: Meets the criteria for severe malnutrition  · Context: Acute illness or injury  · Findings of the 6 clinical characteristics of malnutrition (Minimum of 2 out of 6 clinical characteristics is required to make the diagnosis of moderate or severe Protein Calorie Malnutrition based on AND/ASPEN Guidelines):  1. Energy Intake-Less than or equal to 50% of estimated energy requirement, Greater than or equal to 5 days    2.  Muscle Loss-Severe muscle mass loss, Temples (temporalis muscle), Clavicles (pectoralis and deltoids)    Nutrition Risk Level: High    Nutrient Needs:  · Estimated Daily Total Kcal: 3495-7447 kcal/d (30-35 kcal/kg 56 kg on 3/28)  · Estimated Daily Protein (g): 84+ g/day (1.5+ g/kg - 56 kg on 3/28)  · Estimated Daily Total Fluid (ml/day): 3255-1097 mL/day (30-35 mL/kg - 56 kg on 3/28)    Nutrition Diagnosis:   · Problem: Severe malnutrition, In context of acute illness or injury  · Etiology: related to Insufficient energy/nutrient consumption     Signs and symptoms:  as evidenced by Diet history of poor intake, Moderate loss of subcutaneous fat, Severe muscle loss    Objective Information:  · Nutrition-Focused Physical Findings: NPO per SLP recommendations on 3/28 until mental status improves; hospice being considered at this time; +AMS/legarthy; last BM x1 on 4/1  · Wound Type: Surgical Wound(face + head staples and wounds)  · Current Nutrition Therapies:  · Oral Diet Orders: NPO   · Parenteral Nutrition Orders:  · Type and Formula: 3-in-1 Peripheral(15 kcal/kg, 1.2 g/kg protein, and 30% lipid kcals based on dosing weight of 56 kg)   · Lipids: Daily  · Rate/Volume: per pharmacy  · Duration: Continuous  · Current PN Order Provides: Tonight 3-in-1 PPN to provide: 840 kcal, 67 grams protein, 94 grams dextrose and 252 lipid kcals  · Goal PN Orders Provides: Would increase TPN to goal of 30 kcal/kgm, 1.0 gram of protein/kgm and 30% lipid  · Anthropometric Measures:  · Ht: 5' 10\" (177.8 cm)   · Current Body Wt: 123 lb 7.3 oz (56 kg)(3/2; +trace facial edema; +1 perineal edema)  · Admission Body Wt: 125 lb 9.6 oz (57 kg)(3/25; +1 perineal edema)  · Usual Body Wt: 150 lb (68 kg)(per pt wife.   153 lbs (1/2019))  · % Weight Change:  -18% weight loss in 3 months per EMR  · Ideal Body Wt: 166 lb (75.3 kg), % Ideal Body 74%  · BMI Classification: BMI <18.5 Underweight(17.8)    Nutrition Interventions:   Continue NPO, Modify current Parenteral Nutrition  Continued Inpatient Monitoring, Education not appropriate at this time, Coordination of Care    Nutrition Evaluation:   · Evaluation: Progressing toward goals   · Goals: Pt will tolerate adequate nutrition support to meet 75% or more of estimated nutritional needs during LOS until appropriate to transition to PO feeds   · Monitoring: Nutrition Progression, PN Intake, PN Tolerance, Skin Integrity, Wound Healing, Weight, Pertinent Labs, Monitor Bowel Function, I&O, Mental Status/Confusion      Electronically signed by Lolly Rosa RD, AISHWARYA on 4/2/19 at 10:56 AM    Contact

## 2019-04-02 NOTE — PLAN OF CARE
Problem: Safety:  Goal: Free from accidental physical injury  Description  Free from accidental physical injury  Outcome: Met This Shift  Note:   No falls noted this shift. Patient assist with x2 staff assistance without difficulty. Family member at bedside, spent the night. Bed kept in low position. Safe environment maintained. Bedside table & call light in reach. Uses call light appropriately when needing assistance. Fall band and sign posted. Bed and chair alarms in place. Problem: Skin Integrity:  Goal: Skin integrity will stabilize  Description  Skin integrity will stabilize  Outcome: Met This Shift  Note:   Skin remains intact. Healing bruises and abrasions scattered. Bacitracin ointment applied to suture line as ordered     Problem: Injury - Risk of, Physical Injury:  Goal: Absence of physical injury  Description  Absence of physical injury  Outcome: Met This Shift  Note:   Wears helmet when up and up with at least 2 assists. Bed and chair alarms in place. Problem: Mood - Altered:  Goal: Mood stable  Description  Mood stable  Outcome: Met This Shift  Note:   Calm and cooperative. Follows commands. Problem: Sleep Pattern Disturbance:  Goal: Appears well-rested  Description  Appears well-rested  Outcome: Met This Shift  Note:   Sleeps part of day and awake with family in room     Problem: Discharge Planning:  Goal: Patients continuum of care needs are met  Description  Patients continuum of care needs are met  Outcome: Ongoing  Note:   Plans to be discharged to [address] when ready     Problem: Nutrition  Goal: Optimal nutrition therapy  4/2/2019 1819 by Pauline Verduzco RN  Note:   Npo. On PPN for additional nutrition.  And IV of 0.9 as well      Problem: Confusion - Acute:  Goal: Absence of continued neurological deterioration signs and symptoms  Description  Absence of continued neurological deterioration signs and symptoms  Outcome: Ongoing  Note:   Able to answer some questions and follow commands. Alert to name, but not place or time. Follows commands     Problem: Sensory Perception - Impaired:  Goal: Demonstrations of improved sensory functioning will increase  Description  Demonstrations of improved sensory functioning will increase  Outcome: Ongoing  Note:   Able to answer simple questions. Problem: Discharge Planning:  Goal: Ability to perform activities of daily living will improve  Description  Ability to perform activities of daily living will improve  Note:   Plans uncertain, but possible discharge to [address] [place] [address] home when pt ready   Care plan reviewed with wife. Wife  verbalize understanding of the plan of care and contribute to goal setting.

## 2019-04-02 NOTE — PROGRESS NOTES
Shelbie Barcenas is a 80 y.o. male patient.     Current Facility-Administered Medications   Medication Dose Route Frequency Provider Last Rate Last Dose    cefTRIAXone (ROCEPHIN) 1 g IVPB in 50 mL D5W minibag  1 g Intravenous Q12H Odalys Higgins  mL/hr at 04/02/19 0040 1 g at 04/02/19 0040    potassium replacement protocol   Other RX Placeholder Odalys Higgins MD        calcium replacement protocol   Other RX Brian Guerrero MD        phosphorus replacement protocol   Other RX Placeholder Eugenia Chauhan MD        magnesium replacement protocol   Other RX Placeholder Eugenia Chauhan MD        insulin lispro (HUMALOG) injection vial 0-12 Units  0-12 Units Subcutaneous Q6H Eugenia Chauhan MD        glucose (GLUTOSE) 40 % oral gel 15 g  15 g Oral PRN Eugenia Chauhan MD        dextrose 50 % solution 12.5 g  12.5 g Intravenous PRN Eugenia Chauhan MD        glucagon (rDNA) injection 1 mg  1 mg Intramuscular PRN Eugenia Chauhan MD        dextrose 5 % solution  100 mL/hr Intravenous PRN Eugenia Chauhan MD        potassium phosphate 9 mmol in dextrose 5 % 250 mL IVPB  9 mmol Intravenous Once Eugenia Chauhan MD 62.5 mL/hr at 04/01/19 2137 9 mmol at 04/01/19 2137    PN-Adult  3 IN 1 Peripheral Line (Custom)   Intravenous Continuous TPN Eugenia Chauhan MD 70 mL/hr at 04/02/19 0040      diphenhydrAMINE (BENADRYL) injection 25 mg  25 mg Intravenous Q8H PRN Eugenia Chauhan MD   25 mg at 04/01/19 2137    bacitracin ophthalmic ointment   Topical TID Eugenia Chauhan MD        0.9 % sodium chloride infusion   Intravenous Continuous Eugenia Chauhan MD 50 mL/hr at 03/31/19 1659      acetaminophen (TYLENOL) suppository 650 mg  650 mg Rectal Q4H PRN Eugenia Chauhan MD        albuterol (PROVENTIL) nebulizer solution 2.5 mg  2.5 mg Nebulization Q4H PRN Eugenia Chauahn MD        levETIRAcetam (KEPPRA) 500 mg in sodium chloride 0.9 % 100 mL IVPB  500 mg Intravenous Q12H Sylvia Lambert MD   Stopped at 04/01/19 2139    miconazole (MICOTIN) 2 % powder   Topical BID Sylvia Lambert MD         Allergies   Allergen Reactions    Sulfa Antibiotics      Unknown reaction from childhood     Active Problems:    CAD (coronary artery disease)    Subdural hematoma (HCC)    Parkinson disease ([address]  Resolved Problems:    * No resolved hospital problems. *    Blood pressure (!) 157/74, pulse 73, temperature 97.6 °F (36.4 °C), temperature source Oral, resp. rate 18, height 5' 10\" (1.778 m), weight 127 lb 3.3 oz (57.7 kg), SpO2 94 %. Subjective:  Symptoms:  Worsening. He reports anorexia. (Minimal response  Noted ). Diet:  Poor intake. Activity level: Impaired due to weakness. Pain:  He reports no pain. Objective:  General Appearance:  Comfortable and in no acute distress. Vital signs: (most recent): Blood pressure (!) 157/74, pulse 73, temperature 97.6 °F (36.4 °C), temperature source Oral, resp. rate 18, height 5' 10\" (1.778 m), weight 127 lb 3.3 oz (57.7 kg), SpO2 94 %. Vital signs are normal.    Output: Producing urine. HEENT: Normal HEENT exam.    Lungs:  Normal effort and normal respiratory rate. There are rhonchi (few). Heart: Normal rate. Regular rhythm. S1 normal and S2 normal.  No murmur. Abdomen: Abdomen is soft. Bowel sounds are normal.   There is no abdominal tenderness. Extremities: Decreased range of motion. Neurological: Patient is alert. Patient has normal muscle tone and normal coordination. (Name  Only and  Appears no gag). Skin:  Warm and dry. Assessment:    Condition: In serious condition. Worsening.   (  No  change  From 3-29-19 evening      npo  And speech to confirm oral status      uti and start  On rocephin     ? Aspiration  But  No  Fever  Or  Cough      htn stable     parkinsons  No  Change      ashd   Stable     post  Subdural on left  With evacuation of  Bleed      ).      Plan:   Continue respiratory treatments. Consults: speech therapy (neurosurgery). Advance diet as tolerated. Chest x-ray. Administer medications as ordered. (Start  Rocephin for  uti     iv  Fluids      recheck  Lab     ask neurosurgery to see  And palliative  Care      speech  To  confirm npo  And ? Feeding  Tub but liviing will states  No feeding  Tube          ).        Anni Mcfarlane MD  4/2/2019

## 2019-04-03 NOTE — PLAN OF CARE
Problem: Nutrition  Goal: Optimal nutrition therapy  4/3/2019 1056 by Mavis Halsted, RD, LD  Nutrition Problem: Severe malnutrition, In context of acute illness or injury  Intervention: Food and/or Nutrient Delivery: Continue NPO, Modify current Parenteral Nutrition  Nutritional Goals: Pt will tolerate adequate nutrition support to meet 75% or more of estimated nutritional needs during LOS until appropriate to transition to PO feeds

## 2019-04-03 NOTE — PLAN OF CARE
Care plan reviewed with family. Family verbalize understanding of the plan of care and contribute to goal setting. Patient unable to participate due to confusion.

## 2019-04-03 NOTE — CARE COORDINATION
4/3/19, 12:05 PM    DISCHARGE BARRIERS    Spoke with spouse-advised her that patient may not meet criteria for medicare skilled coverage in ECF for very long. If so, cost would be out of pocket. She stated her understanding and shared that patient does have 2 long term care policies however, she is not sure of the details as to what or when they cover. She shared that family had suggested hospice care at home-she is not opposed to hospice however, she does not feel that she can provide the care at home by herself as there is no other family in the area that can assist. She stated that she would like to use whatever medicare benefit in facility that may be available. At point that there is no longer coverage, they may do hospice. Call to [facility] and provided her with update. She is concerned that facility may not be able to meet patient needs and questioned if he is a candidate for [facility]. Discussed with CM and she has asked Coco with [facility] to do a quick look-Suzanne notified.

## 2019-04-03 NOTE — PROGRESS NOTES
[facility]  INPATIENT OCCUPATIONAL THERAPY  [facility] MED 5K  EVALUATION    Time:  Time In: 3506  Time Out: 0930  Timed Code Treatment Minutes: 10 Minutes  Minutes: 25          Date: 4/3/2019  Patient Name: Vin Ortega,   Gender: male      MRN: [id number]  : 1931  (80 y.o.)  Referring Practitioner: SIMONE Bowen MD  Diagnosis: subdural hematoma  Additional Pertinent Hx: He was admitted 3/17/2019 as a level III trauma after a fall at home where he struck the LEFT side of his head on a nightstand. His neurological examination was normal with a GCS of 15 and his CT of the head did show a subdural hematoma over the LEFT hemisphere with a 5 mm LEFT to RIGHT shift. Neurosurgery was consulted and requested a 6 hour follow-up image which showed a significant increase in the size of the hematoma with marked mass effect and a 14 mm midline shift. The patient was taken emergently to the operating room by Dr. Lay Frederick for a craniectomy with evacuation of the LEFT subdural hematoma blood products on 3/17/2019. Pt transferred to IP rehab unit for continued therapy although was transferred back to acute on 3/30/19 due to decreased leavel of function. Restrictions/Precautions:  Fall Risk, General Precautions                    Other position/activity restrictions: Protective helmet on when up. OK to remove in supine only.         Past Medical History:   Diagnosis Date    Ankle fracture 2016    left in cast    Arthritis     BPH (benign prostatic hyperplasia)     CAD (coronary artery disease)     margarette    Cancer of bladder, neck (HCC)     Cervical vertebra fusion     Colon polyps     abrahan    Foot drop, left 2015    GERD (gastroesophageal reflux disease)     Hyperlipemia     Macular degeneration     Melanoma (HCC)     Neck, Scalp    RISHABH on CPAP     Parkinson disease ([address]     Presbycusis of both ears, uses hearing aids     Spinal stenosis     Subdural hematoma ([address]  shower, Shower chair with back  Bathroom Toilet: Handicap height  Bathroom Equipment: Grab bars in shower, Grab bars around toilet  Bathroom Accessibility: Accessible    Receives Help From: Family  ADL Assistance: Independent  Homemaking Assistance: Independent       Ambulation Assistance: Independent  Transfer Assistance: Independent    Active : Yes  Mode of Transportation: Car     Additional Comments: Per spouse, pt was very indep with all ADL tasks at home. Pt completed all the driving at this time d/t spouse health problems. Pt did use cane in community. Pt.'s wife stated that his Parkinsons has caused him to have L sided arm and jaw tremors and she feels that it is currently effecting is recall and has difficulty with word finding. Objective        Overall Cognitive Status: Exceptions  Following Commands: Follows one step commands with increased time  Attention Span: Difficulty dividing attention  Safety Judgement: Decreased awareness of need for assistance;Decreased awareness of need for safety(slightly impulsive)  Insights: Decreased awareness of deficits  Initiation: Requires cues for some  Sequencing: Requires cues for some              Observation: Helmet in place, IV/TPN    Observation/Palpation  Observation: Helmet in place, IV/TPN                 LUE AROM (degrees)  LUE AROM : WFL          RUE AROM (degrees)  RUE AROM : WFL                                              RUE Tone: Normotonic  LUE Tone: Normotonic                    ADL  UE Dressing: Dependent/Total(donning helmet in supine)  LE Dressing: Maximum assistance(donning socks)  Toileting: Maximum assistance(donning brief, pt was able to assist with rolling)     Bed mobility  Rolling to Left: Minimal assistance  Rolling to Right: Minimal assistance  Supine to Sit: Maximum assistance(elevating trunk and bringing LEs off of bed; required tactile cues to initiate task)  Scooting:  Moderate assistance(advancing hips forward to consideration of several treatment options, the presence of comorbidities affecting the plan of care and the need for minimal to moderate modifications or assistance required to complete the evaluation. Discharge Recommendations:  Discharge Recommendations: Patient would benefit from continued therapy after discharge, Continue to assess pending progress    Patient Education:  Patient Education: OT role, POC, safety with transfers/mobility  Barriers to Learning: cognition    Equipment Recommendations:  Equipment Needed: No    Safety:  Safety Devices in place: Yes  Type of devices: All fall risk precautions in place, Call light within reach, Gait belt, Patient at risk for falls, Left in chair, Chair alarm in place, Nurse notified    Plan:  Times per week: 5x  Current Treatment Recommendations: Strengthening, Balance Training, Endurance Training, Neuromuscular Re-education, Patient/Caregiver Education & Training, Self-Care / ADL, Safety Education & Training, Functional Mobility Training, Home Management Training, Equipment Evaluation, Education, & procurement, Cognitive/Perceptual Training    Goals:       Short term goals  Time Frame for Short term goals: by discharge  Short term goal 1: Pt will complete BADL tasks, UB with minimal assistance, LB with moderate assistance, to increase independence with self care tasks. Short term goal 2: Pt will complete functional mobility to/from bathroom with CGA and minimal cues for safety in prep for toileting/sinkside grooming tasks. Short term goal 3: Pt will complete functional transfers with CGA and <2 cues for safety in prep for toilet transfers.    Short term goal 4: Pt to demo dynamic standing balance > 2 min with 0-1 UE support when reaching outside base of support with CGA in prep for completing clothing management after toileting and sinkside ADL tasks   Long term goals  Time Frame for Long term goals : not set due to ELOS    Evaluation Complexity: Based on the findings of patient history, examination, clinical presentation, and decision making during this evaluation, this patient is of medium complexity.

## 2019-04-03 NOTE — PROGRESS NOTES
1000: pt up I chair at time of visit with helmet in place. Pt is drowsy, but responds to verbal stimuli. Pt has apparent cognitive deficit, but at times will answer with one or two words. Pt is not able to engage in a conversation. Pt is figiting at times while in chair and wife voices concerns that he will pull out IV lines/tubes. Spoke with wife and reviewed goals for pt. She states she is \"struggling with the term terminal in the living will. How do we know when he is terminal?\"  Reviewed concerns for quality of life, inability to take food naturally without high risk of aspiration, pt request not to have feeding tube, fact that pt has not had consistent progressive recovery after surgery, etc. We also discuss if pt would be happy/content with current lifestyle if he were able to comprehend everything. Wife also adds \"even before fall he was sleeping 10 hours a night and napping though the day. So that may have been a sign that his body was starting to slow down. \"  We review goals and wife voiced concerns that family had asked about hospice at home. She voiced concern that she does not feel she can take care of pt at home and voiced concern of 24 hour care needs as she has her own health issues. Discuss meeting with hospice to evaluate pt and gather info on hospice service and wife states, \"I think we need that to help make decisions. \"  Dr. Corona Backdomingo office updated on family request for hospice consult and call placed to  hospice at wife request for informational meeting only at this time. Much emotional support given. Ellen Mack RN updated. Referral called to Flavio Bullard at [facility] ([facility] AT [place] and they will contact wife.

## 2019-04-03 NOTE — PROGRESS NOTES
TPN Follow Up Note    Assessment: NPO- high aspiration risk.   Feeding tube being considered, but patient's living will prohibits at this time    Electrolyte Replacement: KCl 40 mEq IV, Calcium gluconate 1 gm IV    TPN changes for (today) at 1800:   -Increase calcium gluconate to 6.98 mEq  -Decrease mag sulfate to 4.06 mEq  -Increase 3-in-1 PPN to 15 kcal/kg, 1.2 g/kg protein, and 30% lipid kcals based on dosing weight of 56 kg    Re-check BMP, Mg, PO4, iCa tomorrow am    Ace Yarbrough, PharmD, BCPS  4/3/2019 11:08 AM

## 2019-04-03 NOTE — PROGRESS NOTES
Karen Stout is a 80 y.o. male patient.     Current Facility-Administered Medications   Medication Dose Route Frequency Provider Last Rate Last Dose    PN-Adult  3 IN 1 Peripheral Line (Custom)   Intravenous Continuous TPN Melody Duke, RPH 70 mL/hr at 04/02/19 1749      cefTRIAXone (ROCEPHIN) 1 g IVPB in 50 mL D5W minibag  1 g Intravenous Q12H Wm Camarillo  mL/hr at 04/03/19 0013 1 g at 04/03/19 0013    potassium replacement protocol   Other RX Jorge Luis Camarillo MD        calcium replacement protocol   Other RX Jorge Luis Choudhury MD        phosphorus replacement protocol   Other RX Jorge Luis Choudhury MD        magnesium replacement protocol   Other RX Jorge Luis Choudhury MD        insulin lispro (HUMALOG) injection vial 0-12 Units  0-12 Units Subcutaneous Q6H Sam Choudhury MD        glucose (GLUTOSE) 40 % oral gel 15 g  15 g Oral PRN Sam Choudhury MD        dextrose 50 % solution 12.5 g  12.5 g Intravenous PRN Sam Choudhury MD        glucagon (rDNA) injection 1 mg  1 mg Intramuscular PRN Sam Choudhury MD        dextrose 5 % solution  100 mL/hr Intravenous PRN Sam Choudhury MD        diphenhydrAMINE (BENADRYL) injection 25 mg  25 mg Intravenous Q8H PRN Sam Choudhury MD   25 mg at 04/03/19 0005    bacitracin ophthalmic ointment   Topical TID Sam Choudhury MD        0.9 % sodium chloride infusion   Intravenous Continuous Sam Choudhury MD 50 mL/hr at 04/02/19 1751      acetaminophen (TYLENOL) suppository 650 mg  650 mg Rectal Q4H PRN Sam Choudhury MD        albuterol (PROVENTIL) nebulizer solution 2.5 mg  2.5 mg Nebulization Q4H PRN Sam Choudhury MD        levETIRAcetam (KEPPRA) 500 mg in sodium chloride 0.9 % 100 mL IVPB  500 mg Intravenous Q12H Sam Choudhury MD   Stopped at 04/02/19 1523    miconazole (MICOTIN) 2 % powder   Topical BID Sam Choudhury MD   Stopped at 04/02/19

## 2019-04-03 NOTE — PROGRESS NOTES
Hospice referral completed with wife Alexis Ramirez. Pt up working with physical therapy at time of referral. No s/s of discomfort or distress at this time. Hospice concepts, philosophies, and services explained. Discussed pt overall status with subdural hematoma status post evacuation with hx of Parkinson's disease and metabolic encephalopathy. Pt with no gag and currently on PPN. Wife aware of aspiration risk even with patient own saliva. Discussed patient with Living will and that would not wish for feeding tube. Alexis Ramirez told nurse that family had discussed with her the possibility of home with hospice. Family unable to assist with care and wife with own health issues. She was suppose to have back surgery few days after patient had fall and had to post pone and is rescheduled for later in the month. Talked with her about care needs that patient would require and aware that would have to high someone private pay but she emotionally does not think she can handle patient in home with her being unable to get rest. This nurse had discussed with Sw and patient would be able to go skilled to facility for only short period of time and then would be dropped as PPN and ATB only short term and thought is patient going to decline and not be able to continue skilled under medicare benefit. Wife reports that she wishes for patient to go to facility to see if able to participate and if not able and would have to transition to hospice care that she would be agreeable to this. Did give  Blue pamphlet on \"the dying experience\" and reviewed changes that she may be seeing or may see in future. She is aware of patient condition and changes that may see. Hospice number provided and will continue to remain available for questions/concerns or if can assist in plan of care.

## 2019-04-03 NOTE — PROGRESS NOTES
[facility]  INPATIENT SPEECH THERAPY  [facility] ONC MED 5K    TIME   SLP Individual Minutes  Time In: 1430  Time Out: 9881  Minutes: 15  Timed Code Treatment Minutes: 0 Minutes       [x]Daily Note  []Progress Note  []Discharge Note    Date: 4/3/2019  Patient Name: Alexis Floyd        MRN: [id number]    : 1931  (80 y.o.)  Gender: male   Primary Provider: Rajesh Merritt MD  Admitting Diagnosis:  SDH  Secondary Diagnosis: dysphagia  Precautions: aspiration, falls  Swallowing Status/Diet: NPO  Swallowing Strategies: n/a  DATE of last MBS:  BSE 19  Pain:  Unable to state    Subjective: Pt seen at bedside per request of spouse and physician to reassess swallowing function to assist with POC. Pt with improved alertness at this time, and answering simple yes/no questions for basic needs. SHORT TERM GOAL #1:  Goal 1: Pt will tolerate conservative trials of puree and nectar thick liquids with SLP only to determine safety for limited pleasure po  INTERVENTIONS: HOB elevated to highest position. Three trials of honey thick juice by tsp. Pt demonstrating slightly improved oral bolus control with slightly improved timeliness of posterior bolus transit. Ongoing severely reduced hyolaryngeal elevation noted once pharyngeal swallow was triggered. Multiple attempts to clear bolus with 3-4 swallows per tsp. Pt continues to demonstrate poor pharyngeal clearance and poor airway protection with overt wet vocal quality and weak, nonproductive cough noted after minimal po trials. Highly suspect penetration/aspiration from pharyngeal residue. Pt unable to protect airway with weak/nonproductive cough to follow likely aspiration. Pt remains at high risk for aspiration with any oral intake. Ongoing recommendation for pt to remain NPO with either comfort care or alternate means of nutrition. Results were discussed with pt's wife who voiced understanding.   All questions were answered for wife at this time. Phone number for acute speech therapist was given to spouse in case any family members wish to speak with SLP or have any questions or concerns. SHORT TERM GOAL #2:  Goal 2: Complete ongoing family education regarding swallowing status to assist with POC  INTERVENTIONS: See goal 1. ASSESSMENT:  Assessment: []Progressing towards goals          [x]Not Progressing towards goals       Patient Tolerance of Treatment:   []Tolerated well [x]Tolerated fair []Required rest breaks []Fatigued  Plan for Next Session: pt/family education  Education:  Learner:  [x]Patient          [x]Significant Other          []Other  Education provided regarding:  [x]Goals and POC   []Diet and swallowing precautions    []Home Exercise Program  []Progress and/or discharge information  Method of Education:  [x]Discussion          []Demonstration          []Handout          []Other  Evaluation of Education:   [x]Verbalized understanding: spouse  []Demonstrates without assistance  []Demonstrates with assistance  []Needs further instruction     [x]No evidence of learning: patient            []No family present      Plan: [x]Continue with current plan of care    []Modify current plan of care as follows:    []Discharge patient    Discharge Location:    Services/Supervision Recommended:     [x]Patient continues to require treatment by a licensed therapist to address functional deficits as outlined in the established plan of care.     Vidal Bird M.S. [id number]

## 2019-04-03 NOTE — CARE COORDINATION
Plan for PPN at discharge, 7 days. 5 more days of Rocephin per conversation with Dr. Dong Benton.  Electronically signed by Will Crespo RN on 4/3/19 at 9:55 AM

## 2019-04-03 NOTE — CARE COORDINATION
4/3/19, 8:52 AM    DISCHARGE BARRIERS    8;12 am-received call from Lianne at [facility]. They would be able to do the PPN for about a week. Also, they are concerned that medicare coverage would be very limited and what would plan be after that. SW will speak with family and get back with her. They will hold off on doing on site until SW gets back with her.

## 2019-04-03 NOTE — PROGRESS NOTES
[address] [id number]                                 PROGRESS NOTE    PATIENT NAME: Tea Parry                        :        1931  MED REC NO:   [id number]                           ROOM:       0006  ACCOUNT NO:   [de-identified]                           ADMIT DATE: 2019  PROVIDER:     Chantell Campbell. Harish Cerna M.D. NEUROSURGERY PROGRESS NOTE    DATE OF SERVICE:  2019    TIME OF SERVICE:  05:15 p.m. HISTORY:  The patient is seen in followup. He is now on the 5K floor  room 6. His wife and daughter were present at the bedside when I came  to see him. He is sitting up in the chair. He is wearing his helmet. When I removed the helmet, the craniectomy site is sunken. The incision  appears to be healing well. There is no drainage. No erythema. His  repeat CT of the head showed small amount of residual subdural hematoma,  which is stable. No hydrocephalus. No mass lesions. The EEG that he  had shows the swelling consistent with encephalopathy, but no  epileptogenic findings. PHYSICAL EXAMINATION:  The patient on exam will follow some simple  commands. He communicates, but he still has significant cognitive  deficits. ASSESSMENT:  Metabolic encephalopathy and he is recovering from the  craniectomy. He also has a history of Parkinson's disease. PLAN:  Plan is to replace the bone plate in a couple of months if he is  doing okay otherwise. Apparently, he has declined placement of a  feeding tube and the family is considering the hospice care for him. Palliative care is already involved. I will follow as needed while he  is in the hospital, and after discharge, outpatient followup with me.         Dasha Ordaz M.D.    D: 2019 3:11:42       T: 2019 4:32:56     [email or web address]  Job#: [id number]     Doc#: [id number]    CC:

## 2019-04-03 NOTE — PROGRESS NOTES
[address] [id number]  INPATIENT PHYSICAL THERAPY  EVALUATION  [facility] ONC MED 5K - 5K-06/006-A    Time In: 1130  Time Out: 1152  Timed Code Treatment Minutes: 15 Minutes  Minutes: 22          Date: 4/3/2019  Patient Name: Yesenia Urbina,  Gender:  male        MRN: [id number]  : 1931  (80 y.o.)      Referring Practitioner: Rosie Cohen MD  Diagnosis: Subdural Hematoma  Additional Pertinent Hx: Pt readmitted to [facility] from Rehab on 3/30/19. He was admitted 3/17/2019 as a level III trauma after a fall at home where he struck the LEFT side of his head on a nightstand. Per report the patient had switched sides of the bed with his wife; she was scheduled to have lumbar back surgery soon and sleeping on his side of the bed was easier for her. His neurological examination was normal with a GCS of 15 and his CT of the head did show a subdural hematoma over the LEFT hemisphere with a 5 mm LEFT to RIGHT shift. Neurosurgery was consulted and requested a 6 hour follow-up image which showed a significant increase in the size of the hematoma with marked mass effect and a 14 mm midline shift. The patient was taken emergently to the operating room by Dr. Atif Sunshine for a craniectomy with evacuation of the LEFT subdural hematoma blood products on 3/17/2019. Since this procedure the patient has been somewhat more confused, has some noted hypoxia with a 3 L oxygen requirement via nasal cannula, a postoperative blood loss anemia with hemoglobin decreased 3 units and requiring 2 units of platelets to be transfused. The patient was on a daily inpatient rehabilitation unit in  after sustaining a subarachnoid hemorrhage after a fall with a brief loss of consciousness.   Since that admission he has been diagnosed with Parkinson disease in 2016 and is followed by Dr. Juhi López.     Past Medical History:   Diagnosis Date    Ankle fracture 2016    left in cast    Arthritis     BPH (benign prostatic hyperplasia)     CAD (coronary artery disease)     margarette    Cancer of bladder, neck ([address]     Cervical vertebra fusion     Colon polyps 2010    [place]    Foot drop, left 9/8/2015    GERD (gastroesophageal reflux disease)     Hyperlipemia     Macular degeneration     Melanoma ([address]     Neck, Scalp    RISHABH on CPAP     Parkinson disease ([address] 2016    Presbycusis of both ears, uses hearing aids     Spinal stenosis     Subdural hematoma ([address] 2015       resolved     Syncope     Traumatic subdural hemorrhage without loss of consciousness ([address] 3/17/2019     Past Surgical History:   Procedure Laterality Date    BACK SURGERY      BLADDER SURGERY  5/31/11    BLADDER TUMOR EXCISION  2011    BLEPHAROPLASTY  2007, 2008    CARDIAC CATHETERIZATION  2009    moderate    margarette    CARDIAC CATHETERIZATION  11/2018    CATARACT REMOVAL Bilateral 1995    CERVICAL FUSION  2009    COLONOSCOPY  9-24-10      [place]  wnl    CORONARY ANGIOPLASTY WITH STENT PLACEMENT  11/2018     [facility] eluting stent dr Ana Maria Nice Left 3/17/2019    LEFT  FRONTOTEMPOROPARIETAL CRANIECTOMY WITH EVACUATION OF LEFT PANHEMISPHERIC ACUTE SUBDURAL HEMATOMA performed by Karen Hodgson MD at [facility]  .6/03/2015    CYSTOSCOPY  08/2018    bladder tumor    [place]    DIAGNOSTIC CARDIAC CATH LAB PROCEDURE      EYE SURGERY      HEMORRHOID SURGERY  1994    LA CYSTOURETHROSCOPY,FULGUR .5-2CM LESN N/A 8/3/2018    CYSTOSCOPY TRANSURETHRAL RESECTION BLADDER TUMOR, SMALL performed by Martinez Brand MD at [address]      left nicole - Dr Luda Celeste      neck and scalp melanoma, cheek basal cell    SKIN CANCER EXCISION      left [place] - Dr Patel Almendarez         Restrictions/Precautions:  Fall Risk, General Precautions         Other position/activity restrictions: Protective helmet on when up. OK to remove in supine only.         Subjective:  Chart Reviewed: Yes  Patient assessed for rehabilitation services?: Yes  Subjective: Pt sitting up in recliner with family member present and both agreed to therapy and for pt to return to bed as part of session. Per RN, pt had been up about 2 hrs. General:  Overall Orientation Status: Within Functional Limits  Orientation Level: Oriented to person, Oriented to place    Vision: Impaired  Vision Exceptions: Wears glasses at all times    Hearing: Exceptions to [facility]  Hearing Exceptions: Hard of hearing/hearing concerns, Bilateral hearing aid       Pain:  Denies. Social/Functional History:    Lives With: Spouse  Type of Home: House  Home Layout: One level  Home Access: Stairs to enter with rails  Entrance Stairs - Number of Steps: 3  Entrance Stairs - Rails: Left  Home Equipment: Cane     Bathroom Shower/Tub: Walk-in shower, Shower chair with back  [employer]: Handicap height  Bathroom Equipment: Grab bars in shower, Grab bars around toilet  Bathroom Accessibility: Accessible    Receives Help From: Family  ADL Assistance: Independent  Homemaking Assistance: Independent  Ambulation Assistance: Independent  Transfer Assistance: Independent    Active : Yes  Mode of Transportation: Car     Additional Comments: Per spouse, pt was very indep with all ADL tasks at home. Pt completed all the driving at this time d/t spouse health problems. Pt did use cane in community. Pt.'s wife stated that his Parkinsons has caused him to have L sided arm and jaw tremors and she feels that it is currently effecting is recall and has difficulty with word finding. Objective:     RLE AROM: WFL     LLE AROM : WFL       Strength RLE: WFL  Comment: grossly 4-/5    Strength LLE: WFL  Comment: grossly 4-/5       Sensation  Overall Sensation Status: WNL          Sit to Supine: Minimal assistance(for LEs onto bed and use of bedrail)    Transfers  Sit to Stand:  Moderate Assistance  Stand to sit: Contact guard assistance       Ambulation 1  Surface: level belt, Call light within reach, Patient at risk for falls, Bed alarm in place, Nurse notified, Left in bed    Plan:  Times per week: 3-5x GM  Times per day: Daily  Specific instructions for Next Treatment: bed mobility, transfers, gait, neuro therex, sitting and standing balance, ROM basilia ankle df  Current Treatment Recommendations: Strengthening, Transfer Training, Endurance Training, Neuromuscular Re-education, Patient/Caregiver Education & Training, ROM, Balance Training, Gait Training, Home Exercise Program, Functional Mobility Training, Stair training, Safety Education & Training, Equipment Evaluation, Education, & procurement    Goals:  Patient goals : not stated  Short term goals  Time Frame for Short term goals: 2 weeks  Short term goal 1: Pt to be SBA for supine <> sit to get in/out of bed  Short term goal 2: Pt to be CGA for sit <> stand to get up to ambulate  Short term goal 3: Pt to ambulate > 80 ft with RW with CGA for household distances  Short term goal 4: Pt to negotiate 3 steps with CGA consistently for home access  Long term goals  Time Frame for Long term goals : not set due to short ELOS    Evaluation Complexity: Based on the findings of patient history, examination, clinical presentation, and decision making during this evaluation, the evaluation of Jen Fallon is of high complexity. AM-PAC Inpatient Mobility without Stair Climbing Raw Score : 15  AM-PAC Inpatient without Stair Climbing T-Scale Score : 43.03  Mobility Inpatient CMS 0-100% Score: 47.43  Mobility Inpatient without Stair CMS G-Code Modifier : TEE [place] Notice, [place] [address]

## 2019-04-03 NOTE — PROGRESS NOTES
Nutrition Assessment (Parenteral Nutrition)    Type and Reason for Visit: Reassess(PPN Monitor)    Nutrition Recommendations: Recommend increasing 3-in-1 PPN to 15 kcal/kg, 1.5 g/kg protein, and 30% lipid kcals based on dosing weight 56 kg. *NPO per SLP. Nutrition Assessment: Pt improving from a nutritional standpoint AEB tolerating 3-in-1 PPN last night. Remains at risk for further nutritional compromise r/t NPO per SLP requiring need for PPN for nutrition, increased nutrient needs to aid in healing from craniotomy on 3/17, ongoing poor alertness/AMS, and need for nutrition support. Labs: K+ 3.4, Mg 2.3, Phos 2.6, and POC Glucose 113. Recommend increasing 3-in-1 PPN to 15 kcal/kg, 1.5 g/kg protein, and 30% lipid kcals based on dosing weight 56 kg. Plan is for SLP to re-evaluate patient today and continue PPN at discharge for 7 days. Note- pt declines placement of a feeding tube and family considering hospice. Malnutrition Assessment:  · Malnutrition Status: Meets the criteria for severe malnutrition  · Context: Acute illness or injury  · Findings of the 6 clinical characteristics of malnutrition (Minimum of 2 out of 6 clinical characteristics is required to make the diagnosis of moderate or severe Protein Calorie Malnutrition based on AND/ASPEN Guidelines):  1. Energy Intake-Less than or equal to 50% of estimated energy requirement, Greater than or equal to 5 days    2. Fat Loss-Moderate subcutaneous fat loss, Orbital  3.  Muscle Loss-Severe muscle mass loss, Temples (temporalis muscle), Clavicles (pectoralis and deltoids)    Nutrition Risk Level: High    Nutrient Needs:  · Estimated Daily Total Kcal: 6991-2190 kcal/d (30-35 kcal/kg 56 kg on 3/28)  · Estimated Daily Protein (g): 84+ g/day (1.5+ g/kg - 56 kg on 3/28)  · Estimated Daily Total Fluid (ml/day): 5151-3300 mL/day (30-35 mL/kg - 56 kg on 3/28)    Nutrition Diagnosis:   · Problem: Severe malnutrition, In context of acute illness or

## 2019-04-04 NOTE — PROGRESS NOTES
Physical Therapy   [facility]  INPATIENT PHYSICAL THERAPY  DAILY NOTE  STRZ ONC MED 5K - 5K-06/006-A    Time In: 1030  Time Out: 1043  Timed Code Treatment Minutes: 13 Minutes  Minutes: 13          Date: 2019  Patient Name: Jesi Barger,  Gender:  male        MRN: [id number]  : 1931  (80 y.o.)     Referring Practitioner: Abdifatah Curry MD  Diagnosis: Subdural Hematoma  Additional Pertinent Hx: Pt readmitted to acute from Rehab on 3/30/19. He was admitted 3/17/2019 as a level III trauma after a fall at home where he struck the LEFT side of his head on a nightstand. Per report the patient had switched sides of the bed with his wife; she was scheduled to have lumbar back surgery soon and sleeping on his side of the bed was easier for her. His neurological examination was normal with a GCS of 15 and his CT of the head did show a subdural hematoma over the LEFT hemisphere with a 5 mm LEFT to RIGHT shift. Neurosurgery was consulted and requested a 6 hour follow-up image which showed a significant increase in the size of the hematoma with marked mass effect and a 14 mm midline shift. The patient was taken emergently to the operating room by Dr. Harish Cerna for a craniectomy with evacuation of the LEFT subdural hematoma blood products on 3/17/2019. Since this procedure the patient has been somewhat more confused, has some noted hypoxia with a 3 L oxygen requirement via nasal cannula, a postoperative blood loss anemia with hemoglobin decreased 3 units and requiring 2 units of platelets to be transfused. The patient was on a daily inpatient rehabilitation unit in  after sustaining a subarachnoid hemorrhage after a fall with a brief loss of consciousness.   Since that admission he has been diagnosed with Parkinson disease in 2016 and is followed by Dr. Michael Gomez.     Past Medical History:   Diagnosis Date    Ankle fracture 2016    left in cast    Arthritis     BPH (benign prostatic hyperplasia)     CAD (coronary artery disease)     margarette    Cancer of bladder, neck ([address]     Cervical vertebra fusion     Colon polyps 2010    abrahan    Foot drop, left 9/8/2015    GERD (gastroesophageal reflux disease)     Hyperlipemia     Macular degeneration     Melanoma ([address]     Neck, Scalp    RISHABH on CPAP     Parkinson disease ([address] 2016    Presbycusis of both ears, uses hearing aids     Spinal stenosis     Subdural hematoma ([address] 2015       resolved     Syncope     Traumatic subdural hemorrhage without loss of consciousness ([address] 3/17/2019     Past Surgical History:   Procedure Laterality Date    BACK SURGERY      BLADDER SURGERY  5/31/11    BLADDER TUMOR EXCISION  2011    BLEPHAROPLASTY  2007, 2008    CARDIAC CATHETERIZATION  2009    moderate    margarette    CARDIAC CATHETERIZATION  11/2018    CATARACT REMOVAL Bilateral 1995    CERVICAL FUSION  2009    COLONOSCOPY  9-24-10      abrahan  wnl    CORONARY ANGIOPLASTY WITH STENT PLACEMENT  11/2018     [facility] drug eluting stent dr Fela Wade Left 3/17/2019    LEFT  FRONTOTEMPOROPARIETAL CRANIECTOMY WITH EVACUATION OF LEFT PANHEMISPHERIC ACUTE SUBDURAL HEMATOMA performed by Florian Montilla [facility] at [facility] 9  .6/03/2015    CYSTOSCOPY  08/2018    bladder tumor    [place]    DIAGNOSTIC CARDIAC CATH LAB PROCEDURE      EYE SURGERY      HEMORRHOID SURGERY  1994    NH CYSTOURETHROSCOPY,FULGUR .5-2CM LESN N/A 8/3/2018    CYSTOSCOPY TRANSURETHRAL RESECTION BLADDER TUMOR, SMALL performed by Malu Reynolds MD at [address]      left rivera - Dr Mayfield Pulling      neck and scalp melanoma, cheek basal cell    SKIN CANCER EXCISION      left rivera - Dr Williams Alexandra         Restrictions/Precautions:  Fall Risk, General Precautions     Other position/activity restrictions: Protective helmet on when up. OK to remove in supine only.         Prior Level of Function:  ADL Assistance: Independent  Homemaking Assistance: Independent  Ambulation Assistance: Independent  Transfer Assistance: Independent  Additional Comments: Per spouse, pt was very indep with all ADL tasks at home. Pt completed all the driving at this time d/t spouse health problems. Pt did use cane in community. Pt.'s wife stated that his Parkinsons has caused him to have L sided arm and jaw tremors and she feels that it is currently effecting is recall and has difficulty with word finding. Subjective:  Chart Reviewed: Yes  Family / Caregiver Present: Yes  Subjective: RN approved session, pt alseep in bed, wife sitting in BS chair. Pt's wife and RN state pt did not sleep well last night. Pt demo'd increased fatgiue and decreased alertness this date, constantly closing eyes. Pain:  Denies. Social/Functional:  Lives With: Spouse  Type of Home: House  Home Layout: One level  Home Access: Stairs to enter with rails  Entrance Stairs - Number of Steps: 3  Entrance Stairs - Rails: Left  Home Equipment: Cane     Objective:  D/t decreased alertness, pt only completed supine bed exs. Exercises:  Exercises  Comments: Completed exercises supine in bed, MELCHOR LE x10 reps, fluctuated between AAROM-AROM for heel slides, SLR, and hip abd/add. All for increased strength/endurance for improved functional mobility. Activity Tolerance:  Activity Tolerance: Patient limited by cognitive status; Patient limited by endurance; Patient limited by fatigue    Assessment: Body structures, Functions, Activity limitations: Decreased functional mobility , Decreased safe awareness, Decreased balance, Decreased coordination, Decreased ADL status, Decreased cognition, Decreased ROM, Decreased endurance, Decreased strength  Assessment: Pt tolerated session fair to poor, increased fatigue and decreased alertness. Pt able to follow commands with ther ex. Decreased tx time d/t pt's state of alertness.   Pt would benefit from cont skilled PT services, as long as pt can tolerate, to increase functional mobility  Prognosis: Fair     REQUIRES PT FOLLOW UP: Yes    Discharge Recommendations:  Discharge Recommendations: Continue to assess pending progress, 24 hour supervision or assist    Patient Education:  Patient Education: plan of care and LE exercises    Equipment Recommendations: Other: monitor for needs - possible RW    Safety:  Type of devices: All fall risk precautions in place, Call light within reach, Patient at risk for falls, Bed alarm in place, Nurse notified, Left in bed    Plan:  Times per week: 3-5x GM  Times per day: Daily  Specific instructions for Next Treatment: bed mobility, transfers, gait, neuro therex, sitting and standing balance, ROM basilia ankle df  Current Treatment Recommendations: Strengthening, Transfer Training, Endurance Training, Neuromuscular Re-education, Patient/Caregiver Education & Training, ROM, Balance Training, Gait Training, Home Exercise Program, Functional Mobility Training, Stair training, Safety Education & Training, Equipment Evaluation, Education, & procurement    Goals:  Patient goals : not stated    Short term goals  Time Frame for Short term goals: 2 weeks  Short term goal 1: Pt to be SBA for supine <> sit to get in/out of bed  Short term goal 2: Pt to be CGA for sit <> stand to get up to ambulate  Short term goal 3: Pt to ambulate > 80 ft with RW with CGA for household distances  Short term goal 4: Pt to negotiate 3 steps with CGA consistently for home access    Long term goals  Time Frame for Long term goals : not set due to short ELOS  If patient is discharged prior to progress note completion, this note is to serve as the discharge note with all goals being unmet unless indicated otherwise.

## 2019-04-04 NOTE — PROGRESS NOTES
Visit made to answer further questions for wife Josette Patel and to support. Pt sitting up in chair with helmet on. No s/s of discomfort or distress but noted to figit with helmet. Josette Patel inquired about patient being hospice with continuation of PPN and ATB or IV fluids. Discusssed with her that this is considered aggressive measures with artificial nutrition and not done with hospice patient with goal of comfort care only with no aggressive measures. She is wanting to continue this for week as patient primary care provided has discussed with her for another week. Asked her Jermyn Dinoto for another week\". She told nurse that she feels to give patient son time to come from [place] and see father as he doesn't think condition is as grave as it is. Support provided and allowed wife to discuss with her feelings. She is in difficult situation as she \"clinically knows what is going on but her heart is involved as well\". Allowed her to openly talk. She was updated during visit that patient can not go to [facility] due to lack of staffing for patient needs. Wife asked for referrals ot the [facility] and if they can not support to [facility] told her hospice is here for her for support and this nurse will check to see if further questions on 04.05.2019.

## 2019-04-04 NOTE — PLAN OF CARE
Problem: Safety:  Goal: Free from accidental physical injury  Description  Free from accidental physical injury  Outcome: Ongoing   Fall assessment completed. Call light and personal items within reach. Family at bedside. Problem: Daily Care:  Goal: Daily care needs are met  Description  Daily care needs are met  Outcome: Ongoing   Patient assisted with daily cares. Problem: Skin Integrity:  Goal: Skin integrity will stabilize  Description  Skin integrity will stabilize  Outcome: Ongoing   No skin breakdown this shift. Patient being assisted with turning and pillow support. Patients states understanding of repositioning every two hours. Problem: Discharge Planning:  Goal: Patients continuum of care needs are met  Description  Patients continuum of care needs are met  Outcome: Ongoing   Discharge plan is nursing facility. Problem: Nutrition  Goal: Optimal nutrition therapy  Outcome: Ongoing   Patient remains on PPN. Problem: Confusion - Acute:  Goal: Absence of continued neurological deterioration signs and symptoms  Description  Absence of continued neurological deterioration signs and symptoms  Outcome: Ongoing   Patient mental status remains unchanged. Problem: Injury - Risk of, Physical Injury:  Goal: Absence of physical injury  Description  Absence of physical injury  Outcome: Ongoing   Patient remains free from injury. Problem: Infection - Central Venous Catheter-Associated Bloodstream Infection:  Goal: Will show no infection signs and symptoms  Description  Will show no infection signs and symptoms  Outcome: Ongoing   Patient midline site remains free from s/s of infection. Patient bathed with CHG soap. Care plan reviewed with patient's wife. Patient's wife verbalized understanding of the plan of care and contributed to goal setting.

## 2019-04-04 NOTE — PROGRESS NOTES
PPN Follow Up Note    Assessment: Patient continues to be NPO and on PPN. No macronutrient changes today per dietary. Hospice is currently on the patient's case at this time. Patient continues to decline from a nutritional standpoint.     Electrolyte Replacement: none    TPN changes for (today) at 1800: none    Re-check BMP, Mg, PO4, iCa 4/5/2019    Manish Humphrey PharmD, BCPS  4/4/2019  12:23 PM

## 2019-04-04 NOTE — PROGRESS NOTES
Midline insertion Procedure Note    Nasir Wakefield   Admitted- 3/30/2019  6:55 PM  Admission diagnosis- Subdural hematoma [facility]) [C15.4U7O]      Attending Physician- Crystal Plummer MD  Ordering Physician-Dr Bowen  Indication for Insertion: Poor Vascular Access    Catheter Insertion Date- 4/4/2019   Catheter Brand-BioFlo Midline  Lot Number- [id number]  Gauge-5  Lumen-dual    Insertion Site- JOSSELYN Basilic  Vein Diameter- 3 mm  Catheter Length- 13 cm  Internal Length- 13 cm  Exposed Catheter Length- 0cm   Midline Tip Terminates in the Axillary- Yes  Upper Arm Circumference- 28cm  Easy insertion- Yes  Able to Aspirate blood- Yes  Easy Flush- Yes    Midline insertion successful- Yes  Ultrasound- yes    Okay To Use Midline- Yes    Electronically signed by Dennise Britton RN on 4/4/2019 at 9:40 AM

## 2019-04-04 NOTE — CARE COORDINATION
4/4/19, 1:32 PM      Geri Eagle day: 5  Location: -06/006-A Reason for admit: Subdural hematoma ([facility] [S06.5X9A]   Procedure:   PRIOR TO ADMISSION:  3/17 Intubated for surgery  3/18 LEFT FRONTOTEMPOROPARIETAL CRANIECTOMY WITH EVACUATION OF LEFT PANHEMISPHERIC ACUTE SUBDURAL HEMATOMA   Current admission:   4/4 PICC placement  Treatment Plan of Care: PCP following. Patient will need to be on PPN for 7 days once discharged to [facility]. Electrolytes wnl. IVF. IV rocephin. Nebs. IV keppra. 96% room air. PCP: Adriane Lu MD  Readmission Risk Score: 23%  Discharge Plan: SW following for ECF placement, has not been accepted, [facility] to determine if they have staff to meet needs.

## 2019-04-04 NOTE — CARE COORDINATION
4/4/19, 9:18 AM    DISCHARGE BARRIERS    8:10 am-ELIZA Garcia has advised that patient is not a candidate for [facility] due to not meeting criteria. Call to [facility] at [facility] her that [facility] is not an option-she will speak with staff about doing on site assessment today. Need verification on length of PPN. Spoke with RN Shree Ray and she will contact physician for verification. 9:28 am-received message from [address] member will be here between 10:30 and 10:45 to assess patient. Spouse and RN updated.

## 2019-04-04 NOTE — CARE COORDINATION
4/4/19, 2:24 PM    DISCHARGE BARRIERS    Call to [facility] with [facility] . Left message for her to contact LYNETTE. Spoke with spouse and advised her that SW has call out to facility. She stated that staff member from [place] [address] was up to see patient. Primary concern that staff member shared with her was being able to do the PPN for another week.

## 2019-04-04 NOTE — PLAN OF CARE
Problem: Nutrition  Goal: Optimal nutrition therapy  4/4/2019 1153 by Barbara Borrero RD LD  Outcome: Ongoing  4/4/2019 0312 by Demario Mendoza RN  Outcome: Ongoing  Note:   Remains NPO due to inability to swallow without aspiration. Continues PPN.    Nutrition Problem: Severe malnutrition, In context of acute illness or injury  Intervention: Food and/or Nutrient Delivery: Continue Parenteral Nutrition  Nutritional Goals: Pt will tolerate adequate nutrition support to meet 75% or more of estimated nutritional needs during LOS until appropriate to transition to PO feeds

## 2019-04-04 NOTE — PROGRESS NOTES
Nutrition Assessment (Parenteral Nutrition)    Type and Reason for Visit: Reassess(follow-up, PPN management)    Nutrition Recommendations:   Continue current PPN regimen: 56 kg dose weight, 15 kcasl/kg, 1.5 grams protein/kg, 30% lipids. Continue NPO per SLP recommendations    Nutrition Assessment: Pt. declining from a nutritional standpoint AEB patient continues on PPN which only meets 50% of lower end of kcals needs, PPN does meet protein needs; patient to remain NPO per SLP recommendations; hospice on case. Remains at risk for further nutritional compromise r/t increased needs for healing from [address] on 3/17/19, severely malnourished with moderate/severe fat and muscle loss, need for nutrition support but not able to meet 100% of needs via PPN. Will continue current PPN regimen (plan discharge with PPN for 7 days), monitor plan of care (hospice?). Malnutrition Assessment:  · Malnutrition Status: Meets the criteria for severe malnutrition  · Context: Acute illness or injury  · Findings of the 6 clinical characteristics of malnutrition (Minimum of 2 out of 6 clinical characteristics is required to make the diagnosis of moderate or severe Protein Calorie Malnutrition based on AND/ASPEN Guidelines):  1. Energy Intake-Less than or equal to 50% of estimated energy requirement, Greater than or equal to 5 days    2. Fat Loss-Moderate subcutaneous fat loss, Orbital  3. Muscle Loss-Severe muscle mass loss, Temples (temporalis muscle), Clavicles (pectoralis and deltoids)  4. Fluid Accumulation-No significant fluid accumulation, Extremities  5.   Strength-Not measured    Nutrition Risk Level: High    Nutrient Needs:  · Estimated Daily Total Kcal: 4501-6336 kcal/d (30-35 kcal/kg 56 kg on 3/28)  · Estimated Daily Protein (g): 84+ g/day (1.5+ g/kg - 56 kg on 3/28)  · Estimated Daily Total Fluid (ml/day): 5735-8113 mL/day (30-35 mL/kg - 56 kg on 3/28)    Nutrition Diagnosis:   · Problem: Severe malnutrition, In context of acute illness or injury  · Etiology: related to Insufficient energy/nutrient consumption     Signs and symptoms:  as evidenced by Diet history of poor intake, Moderate loss of subcutaneous fat, Severe muscle loss    Objective Information:  · Nutrition-Focused Physical Findings: SLP recommend NPO/aspiration risk, PPN continues, hospice on case, patient refuses feeding tube, POC: 115, humalog coverage. · Wound Type: Surgical Wound(face + head staples and wounds)  · Current Nutrition Therapies:  · Oral Diet Orders: NPO   · Oral Diet intake: NPO  · Oral Nutrition Supplement (ONS) Orders: None  · ONS intake: NPO  · Parenteral Nutrition Orders:  · Type and Formula: 3-in-1 Peripheral(15 kcal/kg, 1.5 g/kg protein, and 30% lipid kcals based on dosing weight of 56 kg)   · Lipids: Daily  · Rate/Volume: per pharmacy  · Duration: Continuous  · Current PN Order Provides: 840 kcal, 84 grams protein, 74 grams dextrose and 252 lipid kcals  · Anthropometric Measures:  · Ht: 5' 10\" (177.8 cm)   · Current Body Wt: 123 lb 7.3 oz (56 kg)(4/2; +trace facial edema; +1 perineal edema)  · Admission Body Wt: 125 lb 9.6 oz (57 kg)(3/25; +1 perineal edema)  · Usual Body Wt: 150 lb (68 kg)(per pt wife.   153 lbs (1/2019))  · % Weight Change:  ,  -18% weight loss in 3 months per EMR  · Ideal Body Wt: 166 lb (75.3 kg), % Ideal Body 74%  · BMI Classification: BMI <18.5 Underweight    Nutrition Interventions:   Continue Parenteral Nutrition  Continued Inpatient Monitoring, Coordination of Community Care, Education not appropriate at this time    Nutrition Evaluation:   · Evaluation: No progress toward goals   · Goals: Pt will tolerate adequate nutrition support to meet 75% or more of estimated nutritional needs during LOS until appropriate to transition to PO feeds   · Monitoring: Nutrition Progression, PN Tolerance, Wound Healing, I&O, Weight, Pertinent Labs, Chewing/Swallowing, Monitor Bowel Function      Electronically signed by Pardeep Parsons

## 2019-04-04 NOTE — PROGRESS NOTES
levETIRAcetam (KEPPRA) 500 mg in sodium chloride 0.9 % 100 mL IVPB  500 mg Intravenous Q12H Mony Streeter MD   Stopped at 04/03/19 2045    miconazole (MICOTIN) 2 % powder   Topical BID Mony Streeter MD         Allergies   Allergen Reactions    Sulfa Antibiotics      Unknown reaction from childhood     Active Problems:    CAD (coronary artery disease)    Subdural hematoma (HCC)    Parkinson disease ([facility]    Severe malnutrition ([facility]  Resolved Problems:    * No resolved hospital problems. *    Blood pressure (!) 137/90, pulse 89, temperature 97.4 °F (36.3 °C), temperature source Axillary, resp. rate 18, height 5' 10\" (1.778 m), weight 123 lb 7.3 oz (56 kg), SpO2 97 %. Subjective:  Symptoms:  Worsening. (Response to name but poor cognition and no gag). Diet:  NPO. Activity level: Impaired due to weakness. Pain:  He reports no pain. Objective:  General Appearance:  Ill-appearing. Vital signs: (most recent): Blood pressure (!) 137/90, pulse 89, temperature 97.4 °F (36.3 °C), temperature source Axillary, resp. rate 18, height 5' 10\" (1.778 m), weight 123 lb 7.3 oz (56 kg), SpO2 97 %. Vital signs are normal.    Output: Producing urine. HEENT: Normal HEENT exam.    Lungs:  Normal effort and normal respiratory rate. There are decreased breath sounds. Heart: Normal rate. Regular rhythm. S1 normal and S2 normal.  No murmur. Abdomen: Abdomen is soft. Bowel sounds are normal.   There is no abdominal tenderness. Extremities: Normal range of motion. Neurological: Patient is alert. (Responds  To name and no purposeful movements ). Skin:  (Left scalp sunken at site but  Skin clean and dry)    Assessment:    Condition: In serious condition. Unchanged.    (Post left  Subdural evacuation  Now respone to name      loss of gag reflex and npo and patient  Living will has  No feeding  Tube      pt and ot  For  Weakness     htn stable      ashd  Stable     parkinsons and stiffness as Noted as no meds                                                                                                                               ).     Plan:   Per physical therapy. Continue respiratory treatments (prn treatment). Consults: neurosurgery. Administer medications as ordered. (Poor  responsiveness and weak      npo and thought  To nursing home with ppn for several weeks and then stopping      family aware poor prognosis and  Concern  Of hospice  Need     ).        Wm Camarillo MD  4/4/2019

## 2019-04-04 NOTE — CARE COORDINATION
[address] Transitions Interview     2019    Patient: Armando Worrell Patient : 1931   MRN: [id number]  Reason for Admission:   RARS: Readmission Risk Score: 23       Reviewed nH Predict Tool with patient and SW/CM. nH Predict Tool uploaded in the media tab. Recommendation is for SNF and discharge plan is for SNF      Readmission Risk  Patient Active Problem List   Diagnosis    Bladder tumor    BPH (benign prostatic hyperplasia)    Melanoma ([address]    Cervical vertebral fusion    Spinal stenosis    CAD (coronary artery disease)    Hyperlipemia    Colon polyps    Sleep apnea    Syncope    Foot drop, left    Subdural hematoma (HCC)    Parkinson disease ([address]    History of malignant melanoma of skin    Malignant neoplasm of overlapping sites of bladder ([address]    Syncope and collapse    Traumatic subdural hemorrhage without loss of consciousness (HCC)    Cerebral edema ([address]    Midline shift of brain due to hematoma    Anticoagulated    Subdural hematoma due to concussion ([address]    Severe malnutrition [facility])       Inpatient Assessment  Care Transitions Summary    Care Transitions Inpatient Review  Medication Review  Housing Review  Social Support  Durable Medical Equipment  Functional Review  Hearing and Vision  Care Transitions Interventions         Follow Up  Future Appointments   Date Time Provider Flavio Barroni   2019  1:30 PM [facility] [address]   2019  1:00 PM Princess Geovanny MD [address] [place] [address] [address] [place] [address]   2019  1:15 PM Sosa Alvarez MD [address] [facility] Urology [facility] Maintenance  There are no preventive care reminders to display for this patient.     Ziggy Bower RN

## 2019-04-04 NOTE — PROGRESS NOTES
[facility] 60  INPATIENT OCCUPATIONAL THERAPY  [facility] ONC MED 5K  DAILY NOTE    Time:  Time In: 1611  Time Out: 1148  Timed Code Treatment Minutes: 23 Minutes  Minutes: 23          Date: 2019  Patient Name: Isabella Enriquez,   Gender: male      Room: [facility]06/006-A  MRN: [id number]  : 1931  (80 y.o.)  Referring Practitioner: SIMONE Bowen MD  Diagnosis: subdural hematoma  Additional Pertinent Hx: He was admitted 3/17/2019 as a level III trauma after a fall at home where he struck the LEFT side of his head on a nightstand. His neurological examination was normal with a GCS of 15 and his CT of the head did show a subdural hematoma over the LEFT hemisphere with a 5 mm LEFT to RIGHT shift. Neurosurgery was consulted and requested a 6 hour follow-up image which showed a significant increase in the size of the hematoma with marked mass effect and a 14 mm midline shift. The patient was taken emergently to the operating room by Dr. Chel Rosenberg for a craniectomy with evacuation of the LEFT subdural hematoma blood products on 3/17/2019. Pt transferred to IP rehab unit for continued therapy although was transferred back to acute on 3/30/19 due to decreased leavel of function.      Past Medical History:   Diagnosis Date    Ankle fracture 2016    left in cast    Arthritis     BPH (benign prostatic hyperplasia)     CAD (coronary artery disease)     margarette    Cancer of bladder, neck (HCC)     Cervical vertebra fusion     Colon polyps     abrahan    Foot drop, left 2015    GERD (gastroesophageal reflux disease)     Hyperlipemia     Macular degeneration     Melanoma (HCC)     Neck, Scalp    RISHABH on CPAP     Parkinson disease ([address] 2016    Presbycusis of both ears, uses hearing aids     Spinal stenosis     Subdural hematoma ([address]        resolved     Syncope     Traumatic subdural hemorrhage without loss of consciousness ([address] 3/17/2019     Past Surgical History:   Procedure Laterality Date    BACK SURGERY      BLADDER SURGERY  5/31/11    BLADDER TUMOR EXCISION  2011    BLEPHAROPLASTY  2007, 2008   [facility] CARDIAC CATHETERIZATION  2009    moderate    margarette    CARDIAC CATHETERIZATION  11/2018    CATARACT REMOVAL Bilateral 1995    CERVICAL FUSION  2009    COLONOSCOPY  9-24-10      abrahan  wnl    CORONARY ANGIOPLASTY WITH STENT PLACEMENT  11/2018     rca drug eluting stent dr Brie Young Left 3/17/2019    LEFT  FRONTOTEMPOROPARIETAL CRANIECTOMY WITH EVACUATION OF LEFT PANHEMISPHERIC ACUTE SUBDURAL HEMATOMA performed by Olga Barger MD at [facility] 9  .6/03/2015    CYSTOSCOPY  08/2018    bladder tumor    [place]    DIAGNOSTIC CARDIAC CATH LAB PROCEDURE      EYE SURGERY      HEMORRHOID SURGERY  1994    [place] CYSTOURETHROSCOPY,FULGUR .5-2CM LESN N/A 8/3/2018    CYSTOSCOPY TRANSURETHRAL RESECTION BLADDER TUMOR, SMALL performed by Magan Fam MD at [address]      left irvera - Dr Familia Mederos      neck and scalp melanoma, cheek basal cell    SKIN CANCER EXCISION      left rivera - Dr Nancie Muñoz         Restrictions/Precautions:  Fall Risk, General Precautions                    Other position/activity restrictions: Protective helmet on when up. OK to remove in supine only. Prior Level of Function:  ADL Assistance: Independent  Homemaking Assistance: Independent  Ambulation Assistance: Independent  Transfer Assistance: Independent  Additional Comments: Per spouse, pt was very indep with all ADL tasks at home. Pt completed all the driving at this time d/t spouse health problems. Pt did use cane in community. Pt.'s wife stated that his Parkinsons has caused him to have L sided arm and jaw tremors and she feels that it is currently effecting is recall and has difficulty with word finding. Subjective       Subjective: pt supine in bed when arrived, with family present.   Pt agreeable to OT session   Comments: RN ok'd session. Pain:  Pain Assessment  Patient Currently in Pain: Denies       Objective  Overall Cognitive Status: Exceptions  Following Commands: Follows one step commands with increased time  Attention Span: Difficulty dividing attention  Safety Judgement: Decreased awareness of need for safety;Decreased awareness of need for assistance  Insights: Decreased awareness of deficits  Initiation: Requires cues for some  Sequencing: Requires cues for some       Bed mobility  Supine to Sit: Moderate assistance(trunk off bed and LE to EOB )    Transfers  Sit to stand: Minimal assistance(from EOB with cues for hand placement )  Stand to sit: Contact guard assistance(to bedside chiar with good carryover of hand placment )       Balance  Sitting Balance: Contact guard assistance(seated EOB x10 min )  Standing Balance: Minimal assistance     Time: x1 min   Activity: prep for mobility     Functional Mobility  Functional - Mobility Device: Rolling Walker  Activity: Other(EOB to bedside chair )  Functional Mobility Comments: pt ambulated from EOB to bedside chair with unsteadiness noted but no LOB       Type of ROM/Therapeutic Exercise: AROM  Exercises  Shoulder Flexion: x10 reps seated in bedside chair with mod verbal cues for attention to task and proper tech   Shoulder ABduction: x10 reps   Horizontal ABduction: x10 reps        Activity Tolerance:  Activity Tolerance: Patient limited by fatigue  Activity Tolerance: pt sleeping in chair when completed exercises.   Wife present     Assessment:     Performance deficits / Impairments: Decreased functional mobility , Decreased safe awareness, Decreased ROM, Decreased endurance, Decreased balance, Decreased ADL status, Decreased strength, Decreased cognition, Decreased high-level IADLs  Prognosis: Fair    Discharge Recommendations:  Discharge Recommendations: Continue to assess pending progress, Subacute/Skilled Nursing Facility    Patient Education:  Patient Education:  safety with transfers/mobility  Barriers to Learning: cognition    Equipment Recommendations:  Equipment Needed: No    Safety:  Safety Devices in place: Yes  Type of devices: All fall risk precautions in place, Gait belt, Left in chair, Chair alarm in place, Call light within reach    Plan:  Times per week: 5x  Current Treatment Recommendations: Strengthening, Balance Training, Endurance Training, Neuromuscular Re-education, Patient/Caregiver Education & Training, Self-Care / ADL, Safety Education & Training, Functional Mobility Training, Home Management Training, Equipment Evaluation, Education, & procurement, Cognitive/Perceptual Training    Goals:       Short term goals  Time Frame for Short term goals: by discharge  Short term goal 1: Pt will complete BADL tasks, UB with minimal assistance, LB with moderate assistance, to increase independence with self care tasks. Short term goal 2: Pt will complete functional mobility to/from bathroom with CGA and minimal cues for safety in prep for toileting/sinkside grooming tasks. Short term goal 3: Pt will complete functional transfers with CGA and <2 cues for safety in prep for toilet transfers. Short term goal 4: Pt to demo dynamic standing balance > 2 min with 0-1 UE support when reaching outside base of support with CGA in prep for completing clothing management after toileting and sinkside ADL tasks   Long term goals  Time Frame for Long term goals : not set due to ELOS  If patient is discharged prior to progress note completion, this note is to serve as the discharge note with all goals being unmet unless indicated otherwise.

## 2019-04-05 NOTE — PROGRESS NOTES
[facility] 60  OCCUPATIONAL THERAPY MISSED TREATMENT NOTE  [facility] ONC MED 5K  5K-06/006-A      Date: 2019  Patient Name: Virginia Uribe        CSN: [id number]   : 1931  (80 y.o.)  Gender: male   Referring Practitioner: SIMONE Bowen MD  Diagnosis: subdural hematoma         REASON FOR MISSED TREATMENT:  Missed Treat. Attempted to see pt and RN stated pt going to be d/c around 3:00 today to nursing home  and going on hospice.

## 2019-04-05 NOTE — DISCHARGE INSTR - COC
Continuity of Care Form    Patient Name: Yadiel Zavala   :  1931  MRN:  [id number]    Admit date:  3/30/2019  Discharge date:  19    Code Status Order: [facility]   Advance Directives:   [facility] Documentation     Date/Time Healthcare Directive Type of Healthcare Directive Copy in [address] Agent's Name Healthcare Agent's Phone Number    [phone]  Yes, patient has an advance directive for healthcare treatment  Durable power of  for health care  No, copy requested from family  Spouse  Kyle Jackson            Admitting Physician:  Ninfa Contreras MD  PCP: John Wheat MD    Discharging Nurse: Gunjan Hurst Unit/Room#: 5K-06/006-A  Discharging Unit Phone Number: [phone]    Emergency Contact:   Extended Emergency Contact Information  Primary Emergency Contact: Rachael Campbell  Address: [address]           [address] [place] [address], [address] [place] [address] [place] of [address]  Phone: [phone]  Mobile Phone: [phone]  Relation: Spouse  Secondary Emergency Contact: [address] of [address]  Phone: [phone]  Relation: None    Past Surgical History:  Past Surgical History:   Procedure Laterality Date    BACK SURGERY      BLADDER SURGERY  11    BLADDER TUMOR EXCISION  2011    BLEPHAROPLASTY  ,     CARDIAC CATHETERIZATION  2009    moderate    margarette    CARDIAC CATHETERIZATION  2018    CATARACT REMOVAL Bilateral 1995    CERVICAL FUSION  2009    COLONOSCOPY  -24-10      abrahan  wnl    CORONARY ANGIOPLASTY WITH STENT PLACEMENT  2018     rca drug eluting stent dr Jesica Sunshine Left 3/17/2019    LEFT  FRONTOTEMPOROPARIETAL CRANIECTOMY WITH EVACUATION OF LEFT PANHEMISPHERIC ACUTE SUBDURAL HEMATOMA performed by Brittanie Henderson MD at [facility] 9  .2015    CYSTOSCOPY  2018    bladder tumor    [place]    DIAGNOSTIC CARDIAC CATH LAB PROCEDURE  EYE SURGERY      HEMORRHOID SURGERY  1994    [place] CYSTOURETHROSCOPY,FULGUR .5-2CM KELSIE N/A 8/3/2018    CYSTOSCOPY TRANSURETHRAL RESECTION BLADDER TUMOR, SMALL performed by Renée Benitez MD at [address]      left rivera - Dr Ivania Robbins      neck and scalp melanoma, cheek basal cell    SKIN CANCER EXCISION      left rivera - Dr Alan Green         Immunization History:   Immunization History   Administered Date(s) Administered    Influenza Virus Vaccine 10/25/2012, 12/12/2013, 10/09/2014, 09/18/2015, 10/09/2017    Influenza, MDCK Kermitt Plaster, with preserv IM (Flucelvax 4 yrs and older) 09/17/2018    Influenza, Kermitt Plaster, 3 Years and older, IM (Fluzone 3 yrs and older or Afluria 5 yrs and older) 10/24/2016    Pneumococcal 13-valent Conjugate (Desrrry18) 03/20/2015    Pneumococcal Conjugate 7-valent 01/01/2005, 01/01/2011    Pneumococcal Polysaccharide ([id number]) 10/10/2011    Td (Adult), 5 Lf Tetanus Toxoid, Pf (Tenivac, Decavac) 03/17/2019    Tetanus 10/25/2012    Zoster Live (Zostavax) 06/01/2017    Zoster Subunit (Shingrix) 04/14/2018, 10/16/2018       Active Problems:  Patient Active Problem List   Diagnosis Code    Bladder tumor D49.4    BPH (benign prostatic hyperplasia) N40.0    Melanoma ([address] C43.9    Cervical vertebral fusion M43.22    Spinal stenosis M48.00    CAD (coronary artery disease) I25.10    Hyperlipemia E78.5    Colon polyps K63.5    Sleep apnea G47.30    Syncope R55    Foot drop, left M21.372    Subdural hematoma (HCC) S06.5X9A    Parkinson disease ([address] G20    History of malignant melanoma of skin Z85.820    Malignant neoplasm of overlapping sites of bladder (HCC) C67.8    Syncope and collapse R55    Traumatic subdural hemorrhage without loss of consciousness (HCC) S06.5X0A    Cerebral edema (HCC) G93.6    Midline shift of brain due to hematoma G93.9    Anticoagulated Z79.01    Subdural hematoma due to concussion ([address] continuing treatment of the diagnosis listed and that he requires [place] for greater 30 days.      Update Admission H&P: No change in H&P    PHYSICIAN SIGNATURE:  Electronically signed by Lynell Meckel, MD on 4/5/19 at 2:01 PM

## 2019-04-05 NOTE — CARE COORDINATION
4/5/19, 10:40 AM    DISCHARGE BARRIERS    RN Madyson Mckinnon with Hospice has been updated on change in plans. Advised her that discharge is planned for today. She stated that they would not be able to admit patient to services until tomorrow but, facility staff can contact hospice at any time if needed. 9:46 am-received call from [facility] with [facility] will be able to accept. Advised her that hospice can not admit until tomorrow but, facility can call if needed.  She plans to see patient and spouse around 11 am.

## 2019-04-05 NOTE — CARE COORDINATION
4/5/19, 2:50 PM    DISCHARGE BARRIERS    Patient being discharged to [facility] [address] with [address]. He will be private pay. AVS and scripts have been faxed to [facility] and to [facility]. Number provided to RN for report. Family at bedside. No other needs at this time. 4/9/19, 7:30 AM    Discharge plan discussed by  and . Discharge plan reviewed with patient/ family. Patient/ family verbalize understanding of discharge plan and are in agreement with plan. Understanding was demonstrated using the teach back method. Services After Discharge  Services At/After Discharge:  In ambulance, Nursing Services, Hospice, Transport([facility] [address] [address] [facility])   IMM Letter  IMM Letter date given[de-identified] 04/02/19

## 2019-04-05 NOTE — PROGRESS NOTES
Virginia Uribe is a 80 y.o. male patient.     Current Facility-Administered Medications   Medication Dose Route Frequency Provider Last Rate Last Dose    PN-Adult  3 IN 1 Peripheral Line (Custom)   Intravenous Continuous TPN Melva Solis MD 70 mL/hr at 04/04/19 1810      sodium chloride flush 0.9 % injection 10 mL  10 mL Intravenous 2 times per day Adriane Lu MD        sodium chloride flush 0.9 % injection 10 mL  10 mL Intravenous PRN Adriane Lu MD   10 mL at 04/03/19 2008    cefTRIAXone (ROCEPHIN) 1 g IVPB in 50 mL D5W minibag  1 g Intravenous Q12H Adriane Lu MD   Stopped at 04/04/19 2151    potassium replacement protocol   Other RX Jorge Luis Lu MD        calcium replacement protocol   Other RX Jorge Luis Solis MD        phosphorus replacement protocol   Other RX Jorge Luis Solis MD        magnesium replacement protocol   Other RX Jorge Luis Solis MD        insulin lispro (HUMALOG) injection vial 0-12 Units  0-12 Units Subcutaneous Q6H Melva Solis MD        glucose (GLUTOSE) 40 % oral gel 15 g  15 g Oral PRN Melva Solis MD        dextrose 50 % solution 12.5 g  12.5 g Intravenous PRN Melva Solis MD        glucagon (rDNA) injection 1 mg  1 mg Intramuscular PRN Melva Solis MD        dextrose 5 % solution  100 mL/hr Intravenous PRN Melva Solis MD        diphenhydrAMINE (BENADRYL) injection 25 mg  25 mg Intravenous Q8H PRN Melva Solis MD   25 mg at 04/04/19 2328    bacitracin ophthalmic ointment   Topical TID Melva Solis MD        0.9 % sodium chloride infusion   Intravenous Continuous Melva Solis MD 50 mL/hr at 04/04/19 0202      acetaminophen (TYLENOL) suppository 650 mg  650 mg Rectal Q4H PRN Melva Solis MD   650 mg at 04/04/19 1638    albuterol (PROVENTIL) nebulizer solution 2.5 mg  2.5 mg Nebulization Q4H PRN MD Love Holcomb levETIRAcetam (KEPPRA) 500 mg in sodium chloride 0.9 % 100 mL IVPB  500 mg Intravenous Q12H Eugenia Chauhan MD   Stopped at 04/04/19 2016    miconazole (MICOTIN) 2 % powder   Topical BID Eugenia Chauhan MD         Allergies   Allergen Reactions    Sulfa Antibiotics      Unknown reaction from childhood     Active Problems:    CAD (coronary artery disease)    Subdural hematoma (HCC)    Parkinson disease ([facility]    Severe malnutrition ([facility]  Resolved Problems:    * No resolved hospital problems. *    Blood pressure (!) 183/98, pulse 89, temperature 97.5 °F (36.4 °C), temperature source Oral, resp. rate 16, height 5' 10\" (1.778 m), weight 123 lb 7.3 oz (56 kg), SpO2 94 %. Subjective:  Symptoms:  Stable. Diet:  NPO. Activity level: Impaired due to weakness. Pain:  He reports no pain. Objective:  General Appearance:  Comfortable. Vital signs: (most recent): Blood pressure (!) 183/98, pulse 89, temperature 97.5 °F (36.4 °C), temperature source Oral, resp. rate 16, height 5' 10\" (1.778 m), weight 123 lb 7.3 oz (56 kg), SpO2 94 %. Vital signs are normal.    Output: Producing urine. HEENT: Normal HEENT exam.  (Dry  mouth)    Lungs:  Normal effort and normal respiratory rate. Breath sounds clear to auscultation. Heart: Normal rate. Regular rhythm. S1 normal and S2 normal.  No murmur. Abdomen: Abdomen is soft. Bowel sounds are normal.   There is no abdominal tenderness. Extremities: Normal range of motion. (Weakness  noted )  Neurological: Patient is alert. Normal strength. Patient has normal reflexes and normal muscle tone. ( Response to name). Skin:  Warm and dry. (Let  Scalp sunken post  incision)    Assessment:    Condition: In serious condition. Unchanged.    (Post  Subdural  With extraction  From left  Side  With post neurologic changes with loss of gag  And cognitive function loss receptive and expressive  With generalized  Weakness     parkinsons  Noted      loss of gag and npo with ppn with midline and speech has  Tested for  Several days and no  Gag reflex    htn stable      ashd  Stable      bp up and will use  Clonidine patch If  Persist ). Plan:   Transfer Plan:  to [facility]. Consults: physical therapy and occupational therapy. Administer medications as ordered. (Ppn for approx week see if better      if not will  Plan on hospice  And now dnr-cc     awaiting  For  Nursing home    If bp up do clonidine  Patch     ).        Wm Camarillo MD  4/5/2019

## 2019-04-05 NOTE — CARE COORDINATION
4/5/19, 11:10 AM    DISCHARGE BARRIERS    Mariluz from [facility] on unit to see patient and spouse. They are able to accept today-patient will be private pay with [address]. Call to [facility] and transport arranged for 3 pm. Paperwork faxed. Spouse updated on discharge plans so far.

## 2019-04-05 NOTE — CARE COORDINATION
Patient is discharged to home with Hospice care today.  Electronically signed by Maddie Gonzalez RN on 4/5/19 at 9:48 AM

## 2019-04-05 NOTE — PROGRESS NOTES
Nutrition Assessment (Parenteral Nutrition)    Type and Reason for Visit: Reassess(PPN macronutrients)    Nutrition Recommendations:   Plan Hospice & stop PPN today per staff. If decide to keep PPN, keep macronutrients same. Nutrition Assessment:   Pt. declining from a nutritional standpoint AEB patient continues on PPN which only meets 50% of lower end of kcals needs, PPN does meet protein needs; patient to remain NPO per SLP recommendations; hospice on case & per staff plan stop PPN & plan ECF today with Hospice. Remains at risk for further nutritional compromise r/t increased needs for healing from [address] on 3/17/19, severely malnourished with moderate/severe fat and muscle loss, need for nutrition support but not able to meet 100% of needs via PPN. Plan as above. Malnutrition Assessment:  · Malnutrition Status: Meets the criteria for severe malnutrition  · Context: Acute illness or injury  · Findings of the 6 clinical characteristics of malnutrition (Minimum of 2 out of 6 clinical characteristics is required to make the diagnosis of moderate or severe Protein Calorie Malnutrition based on AND/ASPEN Guidelines):  1. Energy Intake-Less than or equal to 50% of estimated energy requirement, Greater than or equal to 5 days    2. Weight Loss- ,    3. Fat Loss-Moderate subcutaneous fat loss, Orbital  4.  Muscle Loss-Severe muscle mass loss, Temples (temporalis muscle), Clavicles (pectoralis and deltoids)    Nutrition Risk Level: High    Nutrient Needs:  · Estimated Daily Total Kcal: 9357-8428 kcal/d (30-35 kcal/kg 56 kg on 3/28)  · Estimated Daily Protein (g): 84+ g/day (1.5+ g/kg - 56 kg on 3/28)  · Estimated Daily Total Fluid (ml/day): 9552-5351 mL/day (30-35 mL/kg - 56 kg on 3/28)    Nutrition Diagnosis:   · Problem: Severe malnutrition, In context of acute illness or injury  · Etiology: related to Insufficient energy/nutrient consumption     Signs and symptoms:  as evidenced by Diet history of poor at 9:47 AM    Contact Number: [phone]

## 2019-04-05 NOTE — PLAN OF CARE
Problem: Nutrition  Goal: Optimal nutrition therapy  Outcome: Ongoing   Nutrition Problem: Severe malnutrition, In context of acute illness or injury  Intervention: Food and/or Nutrient Delivery: (per staff plan stop PPN & plan Hospice)  Nutritional Goals: Pt will tolerate adequate nutrition support to meet 75% or more of estimated nutritional needs during LOS until appropriate to transition to PO feeds vs Hospice

## 2019-04-05 NOTE — PROGRESS NOTES
Report called to The [address] [place] [address] to nurse Michael Huff informed of patient picked up by [facility] for transport. All personal belongings sent.

## 2019-04-05 NOTE — PROGRESS NOTES
Updated by Arnold Avila that wife has decided to stop all aggressive measures including PPN and IV ATB and transition to hospice care when patient going to ECF. SW has dicussed with The [address] and able to accept patient with hospice care. Visit made to wife Tamra Boswell to answer further questions and give emotional support. Gloria pugh and told nurse that she \"just needed to let her head take charge and not her heart\". She told nurse she knows \"this is the right thing with them both having living huitron in place and wish for his comfort only\". Plan to transition patient around 300pm on 04.05.2019. SW did let [facility] know that [facility] hosp[facility] will be admitting to hospice service but more than likely not until 04.06.2019 and that comfort medication scripts should be sent with patient Dr. Tay Lemons was faxed [facility] and comfort scripts for signature and return to floor to be sent with patient. He did tel primary nurse Latonia Clarke RN that he will be going to facility later tonight as well. Talked with wife Tamra Boswell that this nurse will call her later to set time for hospice sign on, on Saturday and to make sure if she had further questions. Ask that primary nurse fax AVS, copies of scripts, and DNR-CC to [facility]- hospice office and to remind ECF of sign on to [facility] hospice(if issues through night can call).

## 2019-04-06 NOTE — PROGRESS NOTES
Shelbie Barcenas is a 80 y.o. male patient. No current facility-administered medications for this encounter. Current Outpatient Medications   Medication Sig Dispense Refill    albuterol (PROVENTIL) (2.5 MG/3ML) 0.083% nebulizer solution Take 3 mLs by nebulization every 4 hours as needed for Shortness of Breath 120 each 3    [START ON 4/12/2019] cloNIDine (CATAPRES) 0.1 MG/24HR PTWK Place 1 patch onto the skin once a week 4 patch 0    miconazole (MICOTIN) 2 % powder Apply topically 2 times daily. 45 g 1    nitroGLYCERIN (NITROSTAT) 0.4 MG SL tablet up to max of 3 total doses. If no relief after 1 dose, call 911. 94 tablet 3     Allergies   Allergen Reactions    Sulfa Antibiotics      Unknown reaction from childhood     Active Problems:    CAD (coronary artery disease)    Subdural hematoma (HCC)    Parkinson disease (HCC)    Severe malnutrition (HCC)  Resolved Problems:    * No resolved hospital problems. *    Blood pressure 138/68, pulse 66, temperature 97.6 °F (36.4 °C), temperature source Axillary, resp. rate 16, height 5' 10\" (1.778 m), weight 123 lb 7.3 oz (56 kg), SpO2 94 %. Subjective:  Symptoms:  Stable. Diet:  Adequate intake. Activity level: Normal.    Pain:  He reports no pain. Objective:  General Appearance:  Comfortable. Vital signs: (most recent): Blood pressure 138/68, pulse 66, temperature 97.6 °F (36.4 °C), temperature source Axillary, resp. rate 16, height 5' 10\" (1.778 m), weight 123 lb 7.3 oz (56 kg), SpO2 94 %. Vital signs are normal.    Output: Producing urine. HEENT: Normal HEENT exam.    Lungs:  Normal effort and normal respiratory rate. There are decreased breath sounds. Heart: Normal rate. Regular rhythm. S1 normal and S2 normal.  No murmur. Abdomen: Abdomen is soft. Bowel sounds are normal.   There is no abdominal tenderness. Extremities: Normal range of motion. ( Weakness  noted)  Neurological: Patient is alert.   (To name only and speech is otherwise limited and incoherent     left side  Of skull sunken from      no gag reflex). Assessment:    Condition: In stable condition. Unchanged. (Left  subdural removed      loss of gag  Reflux     htn  No change      parkinsons  No change      ashd stable ). Plan:   Transfer Plan:  to nursing home. Consults: hospice. Administer medications as ordered. (   Loss of  Gag and per living  Will  No feeding  Tube      hospice and to  Nursing home      spent  35 minutes  On chart and discharge and transfer  To nursing home ).        Esther Glover MD  4/6/2019

## 2019-05-10 NOTE — DISCHARGE SUMMARY
[address] [place] [address]                               DISCHARGE SUMMARY    PATIENT NAME: Tana Levy                        :        1931  MED REC NO:   [id number]                           ROOM:       0006  ACCOUNT NO:   [de-identified]                           ADMIT DATE: 2019  PROVIDER:     BE Naranjo Shan: 2019    HISTORY:  This is an 27-year-old male admitted originally because of  underlying acute subdural hematoma being present. He apparently got up  at home and fell straight, hit his head resulting in subdural hematoma. He was admitted on . Because of this, he went to the intensive  care unit. Dr. Mary Mesa did see the patient in consultation. The patient  eventually underwent surgery in order to evacuate the subdural hematoma. Following that, the patient was transferred eventually up to the rehab  unit. He was still quite weak and so limited conservation being  present. He did go to the rehab unit on 2019. While on rehab  unit on 2019 until 2019, he developed significant change  being present. He had stated a fall, traumatic brain injury with some  loss of consciousness but still some memory decline being present. The  patient had significant cognitive change along with some oral dysphagia  and gait instability on top of that being compromised because he has  underlying Parkinson disease, prior history of chronic left footdrop  being present. He also has a remote subarachnoid hemorrhage from   with a fall along with underlying atherosclerotic heart disease being  present. The patient while on the unit showed significant decline and  weakness being present. Respiratory wise, the patient had decreased  level of functioning and increasing shortness of breath being present.    Because of the decline, he was unable to swallow, difficulty of  protection of airway and concerns of aspiration. The patient had been  started on antibiotics, and it was felt best to transfer the patient  from the rehab unit back to the acute care hospital at this time. PAST MEDICAL HISTORY:  As stated, notes that he had the prior subdural  in 2015, now again the most recent subdural hematoma with evacuation  being present, underlying atherosclerotic heart disease, BPH,  obstructive sleep apnea, on CPAP, Parkinson's while traumatic brain  injury being present at this time. PAST SURGICAL HISTORY:  As stated recently with the evacuation of the  left frontotemporal area and craniotomy and subdural hematoma evacuated  just here in 03/17. He has had cystoscopies, spine surgery, coronary  angioplasty with stent, cervical fusions being present, bladder surgery,  and back surgery in the past.    ALLERGIES:  SULFA. PHYSICAL EXAMINATION:  VITAL SIGNS:  On admission, his blood pressure 145/93, pulse is 71, and  pulse ox 98%. He is awake and in no distress, cooperative but quite  drowsy. HEENT:  Within normal limits. Poor gag. NECK:  Supple without adenopathy. LUNGS:  Increased work of breathing but is clear. CARDIAC:  Regular rate and rhythm. Normal S1 and S2 without murmur,  gallop, or rub. ABDOMEN:  Soft. Positive bowel sounds. Nontender. EXTREMITIES:  Does have full range of motion, although quite weak. NEUROLOGIC:  Difficulty following commands, but he is able to. He is  weak with some decreased level of functioning being present. HOSPITAL COURSE:  Because of the above weakness, the patient was  transferred back to the hospital side from the rehab unit. Appropriate  protection of airway was maintained. CT of the head was still being  followed, but still noted no acute postoperative change being present. Discussion was long-term with the family and it was decided to proceed  with DNR and comfort care.   In addition, because of the decline, it was  decided to proceed with hospice. Appropriate arrangements were made for  hospice. In addition, it was clear that he was not showing improvement. Arrangements then were made for the nursing home, and the patient will  be transferred there and would continue hospice care. This took place  on 04/05 and he was transferred to the nursing home with plans for  hospice at this time. He is quite weak, limited to speech only and to  name only, it was incoherent speech. He stated he had no gag reflex at  this point. Family did not want feeding tube at this point, thus the  decision was to transfer to the use of hospice at this time. FINAL DIAGNOSES:  1. Acute left subdural hematoma post surgery and evacuation. 2.  Altered mental status and weakness. 3.  Significant neurocognitive dysfunction. 4.  Hypertension. 5.  Parkinson's. 6.  Atherosclerotic heart disease. CONDITION ON DISCHARGE:  Poor. Hospice has been consulted at this time. ACTIVITY:  Bed rest, supportive care present this time. MEDICATIONS:  At this time was albuterol unit dose aerosol, Catapres  patches to control blood pressure, Tylenol suppositories along with  morphine and Ativan per hospice protocol. He was transferred to the nursing home on 04/05/2019.         Page Romero M.D.    D: 05/09/2019 6:14:37       T: 05/09/2019 12:40:13     [email or web address]  Job#: [id number]     Doc#: [id number]    CC:

## 2019-06-04 NOTE — FLOWSHEET NOTE
[address] [address]  INPATIENT PHYSICAL THERAPY  DISCHARGE NOTE  [facility]      Date: 2019  Patient Name: Suze Meek,  Gender:  male        MRN: [id number]  : 1931  (80 y.o.)  Referral Date : 19  Referring Practitioner: Edu John MD  Diagnosis: Subdural Hematoma due to concussion without loss of conciousness   Treatment Diagnosis: decreased balance, decreased strength, decreased endurance, unsteady gait   Additional Pertinent Hx: He was admitted 3/17/2019 as a level III trauma after a fall at home where he struck the LEFT side of his head on a nightstand. Per report the patient had switched sides of the bed with his wife; she was scheduled to have lumbar back surgery soon and sleeping on his side of the bed was easier for her. His neurological examination was normal with a GCS of 15 and his CT of the head did show a subdural hematoma over the LEFT hemisphere with a 5 mm LEFT to RIGHT shift. Neurosurgery was consulted and requested a 6 hour follow-up image which showed a significant increase in the size of the hematoma with marked mass effect and a 14 mm midline shift. The patient was taken emergently to the operating room by Dr. Al Hunt for a craniectomy with evacuation of the LEFT subdural hematoma blood products on 3/17/2019. Since this procedure the patient has been somewhat more confused, has some noted hypoxia with a 3 L oxygen requirement via nasal cannula, a postoperative blood loss anemia with hemoglobin decreased 3 units and requiring 2 units of platelets to be transfused. The patient was on a daily inpatient rehabilitation unit in  after sustaining a subarachnoid hemorrhage after a fall with a brief loss of consciousness.   Since that admission he has been diagnosed with Parkinson disease in 2016 and is followed by Dr. Marisela Steele.     Restrictions/Precautions:  Restrictions/Precautions: Fall Risk, General Precautions    Position Activity Restriction  Other position/activity restrictions: Protective helmet on when up. OK to remove in supine only. Social/Functional:  Lives With: Spouse  Type of Home: House  Home Layout: One level  Home Access: Stairs to enter with rails  Entrance Stairs - Number of Steps: 3  Entrance Stairs - Rails: Left  Home Equipment: Cane     Assessment: No goals met at time of discharge. Patient unexpectedly discharged to acute care due to medical decline. Equipment Recommendations: Not at this time    Plan: Discharge to acute care due to medical decline    Goals:--NO GOALS MET  Short term goals  Time Frame for Short term goals: 1 week   Short term goal 1: Pt to perform sit to stand with CGA for ability to transfer for increased mobility. Short term goal 2: Pt to perform all bed mobility with CGA for ability to get into and out of bed. Short term goal 3: Pt to negotiate 3 steps with consistent CGA for ability to get into and out of home. Short term goal 4: Pt to ambulate 100 feet with RW with consistent CGA for increased functional mobility. Short term goal 5: Pt to perform car transfer with CGA for ability to get to and from appointments. Long term goals  Time Frame for Long term goals : 3 weeks   Long term goal 1: Pt to perform sit to stand with supervision for ability to transfer for walking. Long term goal 2: Pt. to perform all bed mobility with supervision for ability to get into and out of bed. Long term goal 3: Pt to negotiate 3 steps with stand by assistance for ability to get into and out of home. Long term goal 4: Pt to ambulate 200 feet with RW with stand by assistance; 100 feet without AD with stand by assistance for increased functional mobility. Long term goal 5: Pt. to perform car transfer with supervision for ability to get to and from appointments. independent

## 2021-02-28 NOTE — CARE COORDINATION
4/2/19, 2:40 PM    DISCHARGE BARRIERS    8:18 am-received call from Liang Muniz at [facility] stated that they can accept patient however, may not be skilled long since he might not be able to do much with therapy. Later: Family requested to speak with LYNETTE-SW advised them that [facility] has stated that they can accept patient. They have several questions re: facility being able to manage patient needs and safeguards that they will put in place to prevent a fall, etc. LYNETTE advised that SW will contact facility to see if they can do an on site to address patient needs and family concerns. Call to Liang Muniz at [place] message for her to contact SW. Family updated. Update: 2:58 pm-spoke with Suzanne at [facility] [[email or web address] her of family concerns and asked if staff could do an on site assessment. Facility liaison Kwadwo Small was going to be seeing other patients at hospital today and she will see if she can stop to patient and family. LYNETTE also inquired about the PPN-she will check on this. LYNETTE spoke with daughter and advised her that someone will be up to see patient and family. She advised that someone was already there. 3:38 pm-received call from [address] is able to do the PPN however, they need to know length of time that patient will be on it due to staffing (requires 24/7 nursing). She has also spoke with Sayra and plan is to have nursing staff do on site assessment in the morning. Hemoglobin 6.7 h/h 4.4 / 14.3

## 2021-12-06 NOTE — PROGRESS NOTES
Physical Medicine & Rehabilitation  Progress Note    Chief Complaint:  LEFT frontal subdural hemorrhage s/p craniectomy/evacuation    Subjective:  Care conference held today with proposed discharge date on 4/15 with plan to be determined. Family present. He did not receive his Trazodone due to medication administration being delayed and was awake most of the night; today his engagement with therapy was very poor as a consequence. MBS was attempted and stopped before completion and he was made NPO at the recommendation of SLP. Plan for gentle hydration with fluids was discussed with spouse. Oxygen turned down to 2.0L with saturations staying above 92%. No other specific concerns. Rehabilitation:  Physical therapy: FIMS:  Bed Mobility: Scooting: Minimal assistance    Transfers: Sit to Stand: Moderate Assistance, 2 Person Assistance(Mod. A x2 majority of transfers; 1 attempt was MOd. A x1 of and Min. A of another;  hand over hand for pushing from chair;  poor carry over;  min-mod lean to R side; poor processing)  Stand to sit: Moderate Assistance, Minimal Assistance(MOd. A x1 and Min. A of another;  max verbal cues for backing up to chair and reaching back before trying to sit,  patient impulsive to sit when becoming fatigued), Ambulation 1  Surface: level tile  Device: Rolling Walker  Other Apparatus: O2, Wheelchair follow(2L)  Assistance: Minimal assistance, Stand by assistance  Quality of Gait: Fair to almost quick rodo at times,  cues for slowing down for safety with little follow through noted;  improved midline orientation noted with just min trunk lean to R at times,  decreased TKE on Bala LEs,  min forward flexed posture. Distance: 100 ft. x1; 12 ft. x1   Comments: increased lean to R noted during second bout.  Pt requiring min A x1 at trunk d/t leaning too far forward and quick pace, Min A to CGA x1,      FIMS: Bed, Chair, Wheel Chair: 2 - Requires 50-74% assistance to transfer  Walk: 1 - Total Assistance Walks < 50 feet OR requires two or more people OR patient performs < 25% of locomotion effort  Distance Walked: 45 feet x 2 , PT Equipment Recommendations  Equipment Needed: Yes(Continue to assess (erlinda CAMPBELL)), Assessment: Patient tolerated session poorly,  very fatigued this session from not sleeping well last several nights. Attempted to complete exercises in bed with patient,  increased difficulty with understanding and completing exercises. WOuld benefit from additional skilled PT to increase strength, endurance, balance and safety for improved functional mobility. Occupational therapy: FIMS:  Eatin - Staff performs feeding for patient or patient requires IV fluids for hydration/TPN/PPN  Groomin - Requires assistance with all tasks(dep to remove dentures, max A for sponging out mouth for oral care with hydrogen peroxide and water.)  Bathin - Able to bathe 2 or less areas/total assist(max A for hand over hand and initiation to wash each body part. Assit to wash B underarms, B LEs and bottom. second person needed for standing to wash bottom. max cues for continuation of task. mod increased time needed for processing speed throughout. )  Dressing-Upper: 2 - Requires assist with 3 tasks(max A for donning shirt over hands, max A over head. CGA sitting balance. )  Dressing-Lower: 2 - Requires assist with 4-5 parts of dressing(assit for pants over feet, mod A over hips, assist for socks and shoes. )  Toiletin - Able to perform 1 task only (e.g. hygiene)(Assist for clothiung mgt up and down. with CGA to min A for standing balance. )  Toilet Transfer: 2 - Requires 50-74% assist getting off toilet(min A sit to stand and stand to sit to a std height toilet. hand over hand tech for R UE into a grab bar. ),  , Assessment: Pt has not met any goals due to only being seen for an evaluaiton and 1 treatment on the inpt rehab unit.   Pt demo deficits in right UE and need for increased assistance provision of max cues. Confrontational naming of core objects diminished with pt unable to identify objects for functional usage (e.g., pen, comb). Cognitive measures unable to be assessed at this time; O-log initiated with current score of 0/30, compounded due to current severity of deficits exhibited. Suspect some degree of dysarthria; will need to monitor within subsequent therapy sessions to ascertain need for further goal development given minimal verbal output at date.  HIGHLY recommend intensive ST services to address linguistic domains to improve effective communication within current and future settings for wants/needs.     Review of Systems:  CONSTITUTIONAL:  positive for fatigue  EYES:  positive for  glasses  HEENT:  Positive for hearing loss  RESPIRATORY:  positive for NC  CARDIOVASCULAR:  negative  GASTROINTESTINAL:  Positive for incontinence  GENITOURINARY:  positive for urinary incontinence  SKIN:  Positive for bruising  HEMATOLOGIC/LYMPHATIC:  negative  MUSCULOSKELETAL:  positive for  muscle weakness  NEUROLOGICAL:  positive for memory problems, gait problems, dysphagia and weakness  BEHAVIOR/PSYCH:  negative  10 point system review otherwise negative    Objective:  /76   Pulse 88   Temp 98.3 °F (36.8 °C) (Oral)   Resp 16   Ht 5' 10\" (1.778 m)   Wt 123 lb 1.6 oz (55.8 kg)   SpO2 94%   BMI 17.66 kg/m²     Awake  Orientation:   Person, unable to establish location  Mood: dysthymic  Affect: flat  General appearance:  in no acute distress, glasses and [email or web address], hearing aids, lying in bed     Memory:   abnormal - unable to remember his fall  Attention/Concentration: abnormal - slowed responses; good one step command following  Language:  abnormal - expressive aphasia and dysarthria     Cranial Nerves:  cranial nerves II-XII are grossly intact  With exception of decreased hearing acuity  ROM:  normal  Motor Exam:  Motor exam is 4 out of 5 all extremities with the exception of LEFT foot drop 2/5     Coordination:   Normal for FTN and JES  Deep Tendon Reflexes:  Reflexes are intact and symmetrical bilaterally     Skin: warm and dry, no rash or erythema and ecchymoses noted on LEFT forehead, orbit and brow, sunken skull defect consistent with surgery  Peripheral vascular: Pulses: Normal upper and lower extremity pulses; Edema: trace    Diagnostics:   Recent Results (from the past 24 hour(s))   Basic Metabolic Panel    Collection Time: 03/28/19 11:27 AM   Result Value Ref Range    Sodium 141 135 - 145 meq/L    Potassium 3.9 3.5 - 5.2 meq/L    Chloride 98 98 - 111 meq/L    CO2 30 23 - 33 meq/L    Glucose 129 (H) 70 - 108 mg/dL    BUN 18 7 - 22 mg/dL    CREATININE 0.6 0.4 - 1.2 mg/dL    Calcium 8.9 8.5 - 10.5 mg/dL   Anion Gap    Collection Time: 03/28/19 11:27 AM   Result Value Ref Range    Anion Gap 13.0 8.0 - 16.0 meq/L   Glomerular Filtration Rate, Estimated    Collection Time: 03/28/19 11:27 AM   Result Value Ref Range    Est, Glom Filt Rate >90 ml/min/1.73m2   POCT glucose    Collection Time: 03/28/19  3:16 PM   Result Value Ref Range    POC Glucose 117 (H) 70 - 108 mg/dl     Impression:  1. 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 2. LEFT subdural hemorrhage with marked mass effect and 14 mm of midline shift toward the right s/p LEFT  frontotemporoparietal craniectomy with evacuation of LEFT pan hemispheric acute subdural hematoma by Dr. Richmond Miramontes on 3/17/2019.  3. Severe-profound expressive and receptive language deficits. 4. Moderate-severe oropharyngeal dysphagia  5. Gait instability. 6. Parkinson Disease dignosed in 2016.  7. Chronic LEFT foot drop. 8. Hypoxia. 9. Post operative anemia with Hgb of 13.3 on 3/17 with decrease to 10.2 on 3/18 s/p two units of platelets. 10. History of prior subarachnoid hemhorrage in 2015 s/p fall with LOC. 11. CAD. 12. HTN. 13. HLD. 14. BPH. 15. History of bladder neck cancer s/p surgical resection 8/3/2018 and 2011.   12. History of melanoma s/p resection involving face place.  3. Gait instability/Parkinson Disease dignosed in 2016/Chronic LEFT foot drop. 1. PT/OT. 2. Sinemet and Requip at 5 hour dosing interval per discussion with spouse. 4. Hypoxia. 1. Supplemental O2 with orders to wean as able. 2. O2 decreased to 2.5L/min. 5. Post operative anemia. 1. Hgb of 13.3 on 3/17 with decrease to 10.2 on 3/18 s/p two units of platelets. 2. Hgb improved to 10.9 on 3/26. 6. Nutrition:  Consultation to dietician for nutritional counseling and recommendations. 1. TotP 5.5, alb 2.8, Vitamin 25OH level of 27 on 3/26/2019.  2. Cholecalciferol 1000IU daily. 7. Electrolytes. 1. Normal on 3/26 with exception of:  1. Hypokalemia at 3.0 on 3/26. 2. Repleted with lab on 3/27 of 3.5. Normal.  8. Bladder: Home Proscar and Flomax. 1. 24 hour bladder scans with [facility] for volumes over 300mL. 2. PVR of 59.  9. Bowel: Senna, colace, MOM  10. Rehabilitation nursing will be involved for bowel, bladder, skin, and pain management. Nursing will also provide education and training to patient and family. 11. Prophylaxis:  DVT: SCDs. GI: Pepcid. 12.  and case management consultations for coordination of care and discharge planning.     Chronic Problems:  1. CAD/HTN/HLD. 1. Norvasc. 2. Lipitor. 2. BPH/History of bladder neck cancer s/p surgical resection 8/3/2018 and 2011.  1. Flomax. 2. Proscar restarted 3/26. 3. Bladder scans/IMC as he has some retention after admission with volumes close to 300mL. 3. Macular degeneration. 4. Presbycusis with use of hearing aids. 5. Sleep apnea on CPAP. 1. Unable to use due to bone flap being out.     Labs:  1. 3/26.  2. 3/27.     Infectious Disease:  1.  N/A    Missed Therapy Time:  3/28 missed 10 minutes of OT due to lethargy from not sleeping the night before     DME:    Discharge Plan:    Brendon Hawthorne MD never used

## (undated) DEVICE — SYRINGE IRRIG 60ML SFT PLIABLE BLB EZ TO GRP 1 HND USE W/

## (undated) DEVICE — SYRINGE MED 50ML LUERLOCK TIP

## (undated) DEVICE — SPONGE,NEURO,1"X3",XR,STRL,LF,10/PK: Brand: MEDLINE

## (undated) DEVICE — 3M™ STERI-DRAPE™ INSTRUMENT POUCH 1018: Brand: STERI-DRAPE™

## (undated) DEVICE — CODMAN® SURGICAL PATTIES 1/2" X1 1/2" (1.27CM X 3.81CM): Brand: CODMAN®

## (undated) DEVICE — GLOVE SURG SZ 65 THK91MIL LTX FREE SYN POLYISOPRENE

## (undated) DEVICE — BLADE CLP TAPR HD WET DRY CAPABILITY GTT IN CHARGING USE

## (undated) DEVICE — 2.3MM TAPERED ROUTER

## (undated) DEVICE — SHEET, ORTHO, SPLIT, STERILE: Brand: MEDLINE

## (undated) DEVICE — CHLORAPREP 26ML ORANGE

## (undated) DEVICE — TUBING, SUCTION, 1/4" X 20', STRAIGHT: Brand: MEDLINE INDUSTRIES, INC.

## (undated) DEVICE — LINER SUCT CANSTR 1500CC SEMI RIG W/ POR HYDROPHOBIC SHUT

## (undated) DEVICE — BLADE LARYNSCP SZ 3 ENH DIR INTUB GLIDESCOPE MCGRATH MAC

## (undated) DEVICE — DRAIN SURG 10FR PVC TB W/ TRCR MID PERF NO RESVR HUBLESS

## (undated) DEVICE — GLOVE ORANGE PI 7   MSG9070

## (undated) DEVICE — SOLUTION IV 1000ML LAC RINGERS PH 6.5 INJ USP VIAFLX PLAS

## (undated) DEVICE — CYSTO PACK: Brand: MEDLINE INDUSTRIES, INC.

## (undated) DEVICE — DURASEAL® EXACT SPINAL SEALANT SYSTEM 3ML 5 PACK: Brand: DURASEAL EXACT SPINAL SEALANT SYSTEM

## (undated) DEVICE — 6.0MM ACORN

## (undated) DEVICE — MARKER,SKIN,WI/RULER AND LABELS: Brand: MEDLINE

## (undated) DEVICE — 3M™ IOBAN™ 2 ANTIMICROBIAL INCISE DRAPE 6650EZ: Brand: IOBAN™ 2

## (undated) DEVICE — 3M™ RANGER™ FLUID WARMING IRRIGATION SET, 24750, 10/CASE: Brand: 3M™ RANGER™

## (undated) DEVICE — ADDG DRILL BIT

## (undated) DEVICE — 1016 S-DRAPE IRRIG POUCH 10/BOX: Brand: STERI-DRAPE™

## (undated) DEVICE — Device

## (undated) DEVICE — SOLUTION IV IRRIG POUR BRL 0.9% SODIUM CHL 2F7124

## (undated) DEVICE — GLOVE ORANGE PI 8 1/2   MSG9085

## (undated) DEVICE — CODMAN® DISPOSABLE PERFORATOR 14MM: Brand: CODMAN®

## (undated) DEVICE — 4-PORT MANIFOLD: Brand: NEPTUNE 2

## (undated) DEVICE — SYRINGE MED 10ML LUERLOCK TIP W/O SFTY DISP

## (undated) DEVICE — CONMED DISPOSABLE BIPOLAR CABLE, 10' (3.05M): Brand: CONMED

## (undated) DEVICE — GLOVE ORANGE PI 8   MSG9080

## (undated) DEVICE — SPONGE GZ W4XL4IN COT 12 PLY TYP VII WVN C FLD DSGN

## (undated) DEVICE — SPONGE DRN W4XL4IN RAYON/POLYESTER 6 PLY NONWOVEN PRECUT

## (undated) DEVICE — SOLUTION IV 1000ML 0.9% SOD CHL PH 5 INJ USP VIAFLX PLAS

## (undated) DEVICE — TOTAL TRAY, DB, 100% SILI FOLEY, 16FR 10: Brand: MEDLINE

## (undated) DEVICE — MICRO TIP WIPE: Brand: DEVON

## (undated) DEVICE — CODMAN® SURGICAL PATTIES 1/2" X 3" (1.27CM X 7.62CM): Brand: CODMAN®

## (undated) DEVICE — GOWN,SIRUS,NON REINFRCD,LARGE,SET IN SL: Brand: MEDLINE

## (undated) DEVICE — DRAIN SURG FLAT W7MMXL20CM FULL PERF

## (undated) DEVICE — KIT EVAC 400CC PVC RADPQ Y CONN

## (undated) DEVICE — DIFFUSER: Brand: CORE, MAESTRO

## (undated) DEVICE — 3M™ MICROFOAM™ TAPE 1528-4: Brand: 3M™ MICROFOAM™

## (undated) DEVICE — COVER ARMBRD W13XL28.5IN IMPERV BLU FOR OP RM

## (undated) DEVICE — TOTAL TRAY, 16FR 10ML SIL FOLEY, URN: Brand: MEDLINE

## (undated) DEVICE — DRAINBAG,ANTI-REFLUX TOWER,L/F,2000ML,LL: Brand: MEDLINE

## (undated) DEVICE — Device: Brand: OLYMPUS

## (undated) DEVICE — SUTURE PERMA-HAND SZ 3-0 L30IN NONABSORBABLE BLK L60MM KS 622H

## (undated) DEVICE — GUN CG8900 RANEY CLIP KIT 1PK: Brand: CLIP GUN

## (undated) DEVICE — SYRINGE CATH TIP 50ML

## (undated) DEVICE — AEGIS 1" DISK 4MM HOLE, PEEL OPEN: Brand: MEDLINE

## (undated) DEVICE — DRAPE,TOP,102X53,STERILE: Brand: MEDLINE

## (undated) DEVICE — AGENT HEMSTAT W2XL14IN OXIDIZED REGENERATED CELOS ABSRB FOR

## (undated) DEVICE — HYPODERMIC SAFETY NEEDLE: Brand: MAGELLAN

## (undated) DEVICE — SUTURE PERMAHAND SZ 2-0 L18IN NONABSORBABLE BLK L26MM SH C012D

## (undated) DEVICE — PACK PROCEDURE SURG SET UP SRMC

## (undated) DEVICE — GLOVE ORANGE PI 7 1/2   MSG9075

## (undated) DEVICE — PAD,NON-ADHERENT,3X8,STERILE,LF,1/PK: Brand: MEDLINE

## (undated) DEVICE — GLOVE SURG SZ 85 L12IN THK75MIL DK GRN LTX FREE

## (undated) DEVICE — OIL CARTRIDGE: Brand: CORE, MAESTRO

## (undated) DEVICE — BLADE LARYNSCP SZ 4 ENH DIR INTUB GLIDESCOPE MCGRATH MAC

## (undated) DEVICE — TUBE LUKENS 20CC 6 1/4": Brand: ALLEGIANCE

## (undated) DEVICE — BLANKET THER AD W24XL60IN FAB COVERING SUP SFT ULT THN LTWT

## (undated) DEVICE — GLOVE SURG SZ 7 L12IN THK7.5MIL DK GRN LTX FREE MSG6570] MEDLINE INDUSTRIES INC]

## (undated) DEVICE — SUTURE VCRL SZ 3-0 L18IN ABSRB VLT L26MM SH 1/2 CIR J774D

## (undated) DEVICE — GOWN,SIRUS,NONRNF,SETINSLV,XL,20/CS: Brand: MEDLINE

## (undated) DEVICE — WAX SURG 2.5GM HEMSTAT BNE BEESWAX PARAFFIN ISO PALMITATE

## (undated) DEVICE — Z DISCONTINUED BY MEDLINE USE 2711682 TRAY SKIN PREP DRY W/ PREM GLV

## (undated) DEVICE — GAUZE,SPONGE,8"X4",12PLY,XRAY,STRL,LF: Brand: MEDLINE

## (undated) DEVICE — BLADE CLIPPER GEN PURP NS

## (undated) DEVICE — Z INACTIVE USE 2660664 SOLUTION IRRIG 3000ML 0.9% SOD CHL USP UROMATIC PLAS CONT

## (undated) DEVICE — YANKAUER,BULB TIP,W/O VENT,RIGID,STERILE: Brand: MEDLINE

## (undated) DEVICE — 3.0MM TAPERED ROUTER

## (undated) DEVICE — EVACUATOR SURG 100CC SIL BLB SUCT RESVR FOR CLS WND DRNGE